# Patient Record
Sex: MALE | Race: WHITE | NOT HISPANIC OR LATINO | Employment: OTHER | URBAN - NONMETROPOLITAN AREA
[De-identification: names, ages, dates, MRNs, and addresses within clinical notes are randomized per-mention and may not be internally consistent; named-entity substitution may affect disease eponyms.]

---

## 2024-10-31 ENCOUNTER — APPOINTMENT (OUTPATIENT)
Dept: RADIOLOGY | Facility: HOSPITAL | Age: 64
End: 2024-10-31

## 2024-10-31 ENCOUNTER — APPOINTMENT (OUTPATIENT)
Dept: CARDIOLOGY | Facility: HOSPITAL | Age: 64
End: 2024-10-31

## 2024-10-31 ENCOUNTER — HOSPITAL ENCOUNTER (EMERGENCY)
Facility: HOSPITAL | Age: 64
Discharge: OTHER INSTITUTION | End: 2024-11-01
Attending: EMERGENCY MEDICINE

## 2024-10-31 ENCOUNTER — HOSPITAL ENCOUNTER (INPATIENT)
Facility: HOSPITAL | Age: 64
Discharge: STILL ADMITTED OR ADMINISTRATIVE | End: 2024-11-01
Attending: INTERNAL MEDICINE | Admitting: INTERNAL MEDICINE
Payer: OTHER GOVERNMENT

## 2024-10-31 DIAGNOSIS — R79.89 LACTATE BLOOD INCREASE: ICD-10-CM

## 2024-10-31 DIAGNOSIS — K25.1 ACUTE GASTRIC ULCER WITH PERFORATION (MULTI): Primary | ICD-10-CM

## 2024-10-31 DIAGNOSIS — J44.9 CHRONIC OBSTRUCTIVE PULMONARY DISEASE, UNSPECIFIED COPD TYPE (MULTI): ICD-10-CM

## 2024-10-31 LAB
ALBUMIN SERPL BCP-MCNC: 4.5 G/DL (ref 3.4–5)
ALP SERPL-CCNC: 69 U/L (ref 33–136)
ALT SERPL W P-5'-P-CCNC: 15 U/L (ref 10–52)
ANION GAP SERPL CALC-SCNC: 13 MMOL/L (ref 10–20)
AST SERPL W P-5'-P-CCNC: 26 U/L (ref 9–39)
BASOPHILS # BLD AUTO: 0.08 X10*3/UL (ref 0–0.1)
BASOPHILS NFR BLD AUTO: 0.8 %
BILIRUB SERPL-MCNC: 0.4 MG/DL (ref 0–1.2)
BUN SERPL-MCNC: 9 MG/DL (ref 6–23)
CALCIUM SERPL-MCNC: 9.3 MG/DL (ref 8.6–10.3)
CARDIAC TROPONIN I PNL SERPL HS: 7 NG/L (ref 0–20)
CHLORIDE SERPL-SCNC: 90 MMOL/L (ref 98–107)
CO2 SERPL-SCNC: 24 MMOL/L (ref 21–32)
CREAT SERPL-MCNC: 0.87 MG/DL (ref 0.5–1.3)
EGFRCR SERPLBLD CKD-EPI 2021: >90 ML/MIN/1.73M*2
EOSINOPHIL # BLD AUTO: 0.14 X10*3/UL (ref 0–0.7)
EOSINOPHIL NFR BLD AUTO: 1.4 %
ERYTHROCYTE [DISTWIDTH] IN BLOOD BY AUTOMATED COUNT: 12.4 % (ref 11.5–14.5)
ETHANOL SERPL-MCNC: 108 MG/DL
GLUCOSE SERPL-MCNC: 79 MG/DL (ref 74–99)
HCT VFR BLD AUTO: 46 % (ref 41–52)
HGB BLD-MCNC: 16.6 G/DL (ref 13.5–17.5)
HOLD SPECIMEN: NORMAL
IMM GRANULOCYTES # BLD AUTO: 0.02 X10*3/UL (ref 0–0.7)
IMM GRANULOCYTES NFR BLD AUTO: 0.2 % (ref 0–0.9)
INR PPP: 1.1 (ref 0.9–1.1)
LACTATE SERPL-SCNC: 2.3 MMOL/L (ref 0.4–2)
LACTATE SERPL-SCNC: 2.6 MMOL/L (ref 0.4–2)
LIPASE SERPL-CCNC: 62 U/L (ref 9–82)
LYMPHOCYTES # BLD AUTO: 1.61 X10*3/UL (ref 1.2–4.8)
LYMPHOCYTES NFR BLD AUTO: 16.3 %
MCH RBC QN AUTO: 33.9 PG (ref 26–34)
MCHC RBC AUTO-ENTMCNC: 36.1 G/DL (ref 32–36)
MCV RBC AUTO: 94 FL (ref 80–100)
MONOCYTES # BLD AUTO: 0.51 X10*3/UL (ref 0.1–1)
MONOCYTES NFR BLD AUTO: 5.2 %
NEUTROPHILS # BLD AUTO: 7.5 X10*3/UL (ref 1.2–7.7)
NEUTROPHILS NFR BLD AUTO: 76.1 %
NRBC BLD-RTO: 0 /100 WBCS (ref 0–0)
PLATELET # BLD AUTO: 479 X10*3/UL (ref 150–450)
POTASSIUM SERPL-SCNC: 3.9 MMOL/L (ref 3.5–5.3)
PROT SERPL-MCNC: 8.1 G/DL (ref 6.4–8.2)
PROTHROMBIN TIME: 12.5 SECONDS (ref 9.8–12.8)
RBC # BLD AUTO: 4.9 X10*6/UL (ref 4.5–5.9)
SODIUM SERPL-SCNC: 123 MMOL/L (ref 136–145)
WBC # BLD AUTO: 9.9 X10*3/UL (ref 4.4–11.3)

## 2024-10-31 PROCEDURE — 2500000004 HC RX 250 GENERAL PHARMACY W/ HCPCS (ALT 636 FOR OP/ED): Performed by: EMERGENCY MEDICINE

## 2024-10-31 PROCEDURE — 85610 PROTHROMBIN TIME: CPT | Performed by: EMERGENCY MEDICINE

## 2024-10-31 PROCEDURE — 71045 X-RAY EXAM CHEST 1 VIEW: CPT

## 2024-10-31 PROCEDURE — S4991 NICOTINE PATCH NONLEGEND: HCPCS | Performed by: EMERGENCY MEDICINE

## 2024-10-31 PROCEDURE — 2500000002 HC RX 250 W HCPCS SELF ADMINISTERED DRUGS (ALT 637 FOR MEDICARE OP, ALT 636 FOR OP/ED): Performed by: EMERGENCY MEDICINE

## 2024-10-31 PROCEDURE — 83605 ASSAY OF LACTIC ACID: CPT | Performed by: EMERGENCY MEDICINE

## 2024-10-31 PROCEDURE — 74177 CT ABD & PELVIS W/CONTRAST: CPT

## 2024-10-31 PROCEDURE — 36415 COLL VENOUS BLD VENIPUNCTURE: CPT | Performed by: EMERGENCY MEDICINE

## 2024-10-31 PROCEDURE — 85025 COMPLETE CBC W/AUTO DIFF WBC: CPT | Performed by: EMERGENCY MEDICINE

## 2024-10-31 PROCEDURE — 94664 DEMO&/EVAL PT USE INHALER: CPT

## 2024-10-31 PROCEDURE — 84075 ASSAY ALKALINE PHOSPHATASE: CPT | Performed by: EMERGENCY MEDICINE

## 2024-10-31 PROCEDURE — 96375 TX/PRO/DX INJ NEW DRUG ADDON: CPT

## 2024-10-31 PROCEDURE — 96361 HYDRATE IV INFUSION ADD-ON: CPT

## 2024-10-31 PROCEDURE — 2500000005 HC RX 250 GENERAL PHARMACY W/O HCPCS: Performed by: EMERGENCY MEDICINE

## 2024-10-31 PROCEDURE — 82077 ASSAY SPEC XCP UR&BREATH IA: CPT | Performed by: EMERGENCY MEDICINE

## 2024-10-31 PROCEDURE — 71045 X-RAY EXAM CHEST 1 VIEW: CPT | Performed by: RADIOLOGY

## 2024-10-31 PROCEDURE — 2500000004 HC RX 250 GENERAL PHARMACY W/ HCPCS (ALT 636 FOR OP/ED)

## 2024-10-31 PROCEDURE — 83930 ASSAY OF BLOOD OSMOLALITY: CPT | Mod: CONLAB

## 2024-10-31 PROCEDURE — 87040 BLOOD CULTURE FOR BACTERIA: CPT | Mod: CONLAB | Performed by: EMERGENCY MEDICINE

## 2024-10-31 PROCEDURE — 99285 EMERGENCY DEPT VISIT HI MDM: CPT | Mod: 25

## 2024-10-31 PROCEDURE — 74177 CT ABD & PELVIS W/CONTRAST: CPT | Performed by: RADIOLOGY

## 2024-10-31 PROCEDURE — 2500000001 HC RX 250 WO HCPCS SELF ADMINISTERED DRUGS (ALT 637 FOR MEDICARE OP): Performed by: EMERGENCY MEDICINE

## 2024-10-31 PROCEDURE — 94640 AIRWAY INHALATION TREATMENT: CPT

## 2024-10-31 PROCEDURE — 93005 ELECTROCARDIOGRAM TRACING: CPT

## 2024-10-31 PROCEDURE — 84484 ASSAY OF TROPONIN QUANT: CPT | Performed by: EMERGENCY MEDICINE

## 2024-10-31 PROCEDURE — 96365 THER/PROPH/DIAG IV INF INIT: CPT | Mod: 59

## 2024-10-31 PROCEDURE — 2550000001 HC RX 255 CONTRASTS: Performed by: EMERGENCY MEDICINE

## 2024-10-31 PROCEDURE — 83690 ASSAY OF LIPASE: CPT | Performed by: EMERGENCY MEDICINE

## 2024-10-31 RX ORDER — DICLOFENAC SODIUM 10 MG/G
4 GEL TOPICAL 4 TIMES DAILY
Status: ON HOLD | COMMUNITY
Start: 2024-08-19

## 2024-10-31 RX ORDER — IPRATROPIUM BROMIDE AND ALBUTEROL 20; 100 UG/1; UG/1
1 SPRAY, METERED RESPIRATORY (INHALATION)
Status: ON HOLD | COMMUNITY

## 2024-10-31 RX ORDER — NAPROXEN SODIUM 220 MG/1
81 TABLET, FILM COATED ORAL DAILY
Status: ON HOLD | COMMUNITY
Start: 2024-02-08

## 2024-10-31 RX ORDER — FENTANYL CITRATE 50 UG/ML
INJECTION, SOLUTION INTRAMUSCULAR; INTRAVENOUS
Status: COMPLETED
Start: 2024-10-31 | End: 2024-10-31

## 2024-10-31 RX ORDER — OXYMETAZOLINE HCL 0.05 %
2 SPRAY, NON-AEROSOL (ML) NASAL ONCE
Status: COMPLETED | OUTPATIENT
Start: 2024-10-31 | End: 2024-10-31

## 2024-10-31 RX ORDER — ALBUTEROL SULFATE 90 UG/1
2 INHALANT RESPIRATORY (INHALATION) EVERY 6 HOURS PRN
Status: ON HOLD | COMMUNITY
Start: 2024-05-01

## 2024-10-31 RX ORDER — BACLOFEN 10 MG/1
1 TABLET ORAL 3 TIMES DAILY PRN
Status: ON HOLD | COMMUNITY
Start: 2024-02-08

## 2024-10-31 RX ORDER — AMLODIPINE BESYLATE 10 MG/1
1 TABLET ORAL DAILY
Status: ON HOLD | COMMUNITY
Start: 2024-02-08

## 2024-10-31 RX ORDER — VIT C/E/ZN/COPPR/LUTEIN/ZEAXAN 250MG-90MG
1000 CAPSULE ORAL DAILY
Status: ON HOLD | COMMUNITY
Start: 2023-07-17

## 2024-10-31 RX ORDER — IPRATROPIUM BROMIDE AND ALBUTEROL SULFATE 2.5; .5 MG/3ML; MG/3ML
3 SOLUTION RESPIRATORY (INHALATION)
Status: DISCONTINUED | OUTPATIENT
Start: 2024-10-31 | End: 2024-10-31

## 2024-10-31 RX ORDER — FLUTICASONE PROPIONATE 50 MCG
2 SPRAY, SUSPENSION (ML) NASAL
Status: ON HOLD | COMMUNITY
Start: 2014-06-22

## 2024-10-31 RX ORDER — FENTANYL CITRATE 50 UG/ML
25 INJECTION, SOLUTION INTRAMUSCULAR; INTRAVENOUS ONCE
Status: COMPLETED | OUTPATIENT
Start: 2024-10-31 | End: 2024-10-31

## 2024-10-31 RX ORDER — MELOXICAM 15 MG/1
1 TABLET ORAL
Status: ON HOLD | COMMUNITY
Start: 2024-09-03

## 2024-10-31 RX ORDER — PANTOPRAZOLE SODIUM 40 MG/10ML
40 INJECTION, POWDER, LYOPHILIZED, FOR SOLUTION INTRAVENOUS DAILY
Status: DISCONTINUED | OUTPATIENT
Start: 2024-10-31 | End: 2024-11-01 | Stop reason: HOSPADM

## 2024-10-31 RX ORDER — IBUPROFEN 200 MG
1 TABLET ORAL DAILY
Status: DISCONTINUED | OUTPATIENT
Start: 2024-10-31 | End: 2024-11-01 | Stop reason: HOSPADM

## 2024-10-31 RX ORDER — LIDOCAINE HYDROCHLORIDE 20 MG/ML
1 JELLY TOPICAL ONCE
Status: COMPLETED | OUTPATIENT
Start: 2024-10-31 | End: 2024-10-31

## 2024-10-31 RX ORDER — IPRATROPIUM BROMIDE AND ALBUTEROL SULFATE 2.5; .5 MG/3ML; MG/3ML
3 SOLUTION RESPIRATORY (INHALATION) ONCE
Status: COMPLETED | OUTPATIENT
Start: 2024-10-31 | End: 2024-10-31

## 2024-10-31 RX ORDER — GABAPENTIN 300 MG/1
2 CAPSULE ORAL 3 TIMES DAILY
Status: ON HOLD | COMMUNITY
Start: 2024-02-08

## 2024-10-31 RX ORDER — SODIUM CHLORIDE 9 MG/ML
200 INJECTION, SOLUTION INTRAVENOUS CONTINUOUS
Status: DISCONTINUED | OUTPATIENT
Start: 2024-10-31 | End: 2024-11-01 | Stop reason: HOSPADM

## 2024-10-31 RX ORDER — HYDROMORPHONE HYDROCHLORIDE 1 MG/ML
1 INJECTION, SOLUTION INTRAMUSCULAR; INTRAVENOUS; SUBCUTANEOUS ONCE
Status: COMPLETED | OUTPATIENT
Start: 2024-10-31 | End: 2024-10-31

## 2024-10-31 RX ORDER — ONDANSETRON HYDROCHLORIDE 2 MG/ML
4 INJECTION, SOLUTION INTRAVENOUS ONCE
Status: COMPLETED | OUTPATIENT
Start: 2024-10-31 | End: 2024-10-31

## 2024-10-31 RX ADMIN — IOHEXOL 75 ML: 350 INJECTION, SOLUTION INTRAVENOUS at 18:45

## 2024-10-31 RX ADMIN — IPRATROPIUM BROMIDE AND ALBUTEROL SULFATE 3 ML: .5; 3 SOLUTION RESPIRATORY (INHALATION) at 19:46

## 2024-10-31 RX ADMIN — IPRATROPIUM BROMIDE AND ALBUTEROL SULFATE 3 ML: .5; 3 SOLUTION RESPIRATORY (INHALATION) at 23:46

## 2024-10-31 RX ADMIN — IPRATROPIUM BROMIDE AND ALBUTEROL SULFATE 3 ML: .5; 3 SOLUTION RESPIRATORY (INHALATION) at 19:36

## 2024-10-31 RX ADMIN — HYDROMORPHONE HYDROCHLORIDE 1 MG: 1 INJECTION, SOLUTION INTRAMUSCULAR; INTRAVENOUS; SUBCUTANEOUS at 17:55

## 2024-10-31 RX ADMIN — SODIUM CHLORIDE 1000 ML: 9 INJECTION, SOLUTION INTRAVENOUS at 17:54

## 2024-10-31 RX ADMIN — PIPERACILLIN SODIUM AND TAZOBACTAM SODIUM 3.38 G: 3; .375 INJECTION, SOLUTION INTRAVENOUS at 20:15

## 2024-10-31 RX ADMIN — FENTANYL CITRATE 25 MCG: 50 INJECTION INTRAMUSCULAR; INTRAVENOUS at 23:14

## 2024-10-31 RX ADMIN — LIDOCAINE HYDROCHLORIDE 1 APPLICATION: 20 JELLY TOPICAL at 22:50

## 2024-10-31 RX ADMIN — SODIUM CHLORIDE 1000 ML: 9 INJECTION, SOLUTION INTRAVENOUS at 19:30

## 2024-10-31 RX ADMIN — NICOTINE 1 PATCH: 21 PATCH, EXTENDED RELEASE TRANSDERMAL at 20:27

## 2024-10-31 RX ADMIN — SODIUM CHLORIDE 200 ML/HR: 9 INJECTION, SOLUTION INTRAVENOUS at 21:18

## 2024-10-31 RX ADMIN — BENZOCAINE, BUTAMBEN, AND TETRACAINE HYDROCHLORIDE 1 SPRAY: .028; .004; .004 AEROSOL, SPRAY TOPICAL at 22:50

## 2024-10-31 RX ADMIN — FENTANYL CITRATE 25 MCG: 50 INJECTION, SOLUTION INTRAMUSCULAR; INTRAVENOUS at 23:14

## 2024-10-31 RX ADMIN — PANTOPRAZOLE SODIUM 40 MG: 40 INJECTION, POWDER, FOR SOLUTION INTRAVENOUS at 20:15

## 2024-10-31 RX ADMIN — ONDANSETRON 4 MG: 2 INJECTION INTRAMUSCULAR; INTRAVENOUS at 17:54

## 2024-10-31 RX ADMIN — OXYMETAZOLINE HYDROCHLORIDE 2 SPRAY: 0.5 SPRAY NASAL at 22:50

## 2024-10-31 ASSESSMENT — PAIN SCALES - GENERAL
PAINLEVEL_OUTOF10: 10 - WORST POSSIBLE PAIN
PAINLEVEL_OUTOF10: 7
PAINLEVEL_OUTOF10: 7
PAINLEVEL_OUTOF10: 0 - NO PAIN
PAINLEVEL_OUTOF10: 7

## 2024-10-31 ASSESSMENT — PAIN DESCRIPTION - PROGRESSION: CLINICAL_PROGRESSION: GRADUALLY WORSENING

## 2024-10-31 ASSESSMENT — PAIN - FUNCTIONAL ASSESSMENT
PAIN_FUNCTIONAL_ASSESSMENT: 0-10
PAIN_FUNCTIONAL_ASSESSMENT: 0-10

## 2024-10-31 ASSESSMENT — PAIN DESCRIPTION - DESCRIPTORS: DESCRIPTORS: DISCOMFORT

## 2024-10-31 ASSESSMENT — PAIN DESCRIPTION - ONSET: ONSET: GRADUAL

## 2024-10-31 ASSESSMENT — PAIN DESCRIPTION - LOCATION
LOCATION: ABDOMEN
LOCATION: ABDOMEN

## 2024-10-31 ASSESSMENT — COLUMBIA-SUICIDE SEVERITY RATING SCALE - C-SSRS
2. HAVE YOU ACTUALLY HAD ANY THOUGHTS OF KILLING YOURSELF?: NO
6. HAVE YOU EVER DONE ANYTHING, STARTED TO DO ANYTHING, OR PREPARED TO DO ANYTHING TO END YOUR LIFE?: NO
1. IN THE PAST MONTH, HAVE YOU WISHED YOU WERE DEAD OR WISHED YOU COULD GO TO SLEEP AND NOT WAKE UP?: NO

## 2024-10-31 ASSESSMENT — PAIN DESCRIPTION - ORIENTATION
ORIENTATION: RIGHT;LEFT
ORIENTATION: ANTERIOR

## 2024-10-31 ASSESSMENT — PAIN DESCRIPTION - PAIN TYPE: TYPE: ACUTE PAIN

## 2024-10-31 ASSESSMENT — PAIN DESCRIPTION - FREQUENCY: FREQUENCY: CONSTANT/CONTINUOUS

## 2024-10-31 NOTE — ED PROVIDER NOTES
Abdominal Pain      HPI  Patient with abdominal pain for nearly a week is gotten worse.  Family member provides part of the history.  Patient has severe abdominal pain.  No vomiting no diarrhea but not eating or drinking well.  Patient does drink alcohol daily.  No chest pain no other major complaints    Social history: Drinks around 6 beers per day    Physical Exam  Gen.: Vitals noted, uncomfortable appearing afebrile   Head: Normocephalic  ENT: Pupils equal, patent nares.  Normal oral mucosa   Neck: Supple.   Cardiac: Regular rate rhythm   Lungs: diminished with some wheezing bilaterally  Abdomen: Distended, diffuse, rigid abdominal tenderness.  Back: No midline or paraspinal tenderness.    Extremities:  Moves all extremities.  Skin: No rash.   Neuro: No focal neurologic deficits.    Past Medical History:   Diagnosis Date   • Melena 09/18/2014    Blood in the stool   • Personal history of colonic polyps 10/02/2014    History of adenomatous polyp of colon     Labs Reviewed   CBC WITH AUTO DIFFERENTIAL - Abnormal       Result Value    WBC 9.9      nRBC 0.0      RBC 4.90      Hemoglobin 16.6      Hematocrit 46.0      MCV 94      MCH 33.9      MCHC 36.1 (*)     RDW 12.4      Platelets 479 (*)     Neutrophils % 76.1      Immature Granulocytes %, Automated 0.2      Lymphocytes % 16.3      Monocytes % 5.2      Eosinophils % 1.4      Basophils % 0.8      Neutrophils Absolute 7.50      Immature Granulocytes Absolute, Automated 0.02      Lymphocytes Absolute 1.61      Monocytes Absolute 0.51      Eosinophils Absolute 0.14      Basophils Absolute 0.08     COMPREHENSIVE METABOLIC PANEL - Abnormal    Glucose 79      Sodium 123 (*)     Potassium 3.9      Chloride 90 (*)     Bicarbonate 24      Anion Gap 13      Urea Nitrogen 9      Creatinine 0.87      eGFR >90      Calcium 9.3      Albumin 4.5      Alkaline Phosphatase 69      Total Protein 8.1      AST 26      Bilirubin, Total 0.4      ALT 15     LACTATE - Abnormal    Lactate  2.6 (*)     Narrative:     Venipuncture immediately after or during the administration of Metamizole may lead to falsely low results. Testing should be performed immediately prior to Metamizole dosing.   ALCOHOL - Abnormal    Alcohol 108 (*)    LIPASE - Normal    Lipase 62      Narrative:     Venipuncture immediately after or during the administration of Metamizole may lead to falsely low results. Testing should be performed immediately prior to Metamizole dosing.   PROTIME-INR - Normal    Protime 12.5      INR 1.1     TROPONIN I, HIGH SENSITIVITY - Normal    Troponin I, High Sensitivity 7      Narrative:     Less than 99th percentile of normal range cutoff-  Female and children under 18 years old <14 ng/L; Male <21 ng/L: Negative  Repeat testing should be performed if clinically indicated.     Female and children under 18 years old 14-50 ng/L; Male 21-50 ng/L:  Consistent with possible cardiac damage and possible increased clinical   risk. Serial measurements may help to assess extent of myocardial damage.     >50 ng/L: Consistent with cardiac damage, increased clinical risk and  myocardial infarction. Serial measurements may help assess extent of   myocardial damage.      NOTE: Children less than 1 year old may have higher baseline troponin   levels and results should be interpreted in conjunction with the overall   clinical context.     NOTE: Troponin I testing is performed using a different   testing methodology at [facility] than at other   [facility]. Direct result comparisons should only   be made within the same method.   GRAY TOP    Extra Tube Hold for add-ons.     URINALYSIS WITH REFLEX CULTURE AND MICROSCOPIC    Narrative:     The following orders were created for panel order Urinalysis with Reflex Culture and Microscopic.  Procedure                               Abnormality         Status                     ---------                               -----------         ------                      Urinalysis with Reflex C...[[id number]]                                                 Extra Urine Gray Tube[[id number]]                                                         Please view results for these tests on the individual orders.   URINALYSIS WITH REFLEX CULTURE AND MICROSCOPIC   EXTRA URINE GRAY TUBE   LACTATE      ED Course as of 11/07/24 1042   Thu Oct 31, 2024   1744 Will give pain medicine and IV fluids. [DD]   1914 Since blood pressure dropped into the 80s could be because he is sleeping and he received Dilaudid however organ to give another liter of fluids since lactate is slightly up and go ahead and get blood cultures also I was able to reassess him since he was able to be more comfortable and follow commands such as taking deep breaths he does have some diffuse breath sounds with wheezing that are diminished. [DD]   1931 Patient's blood pressure for the past hour or so has been in the 80s his pulse ox around 92 on 2 L.  He is going to start the second liter of IV fluids his blood pressure was over 100 until he received the Dilaudid.  He still appears to be uncomfortable but does appear to be in less distress with versus his presentation on arrival.  We are waiting on CAT scan I ordered another lactate since the initial 1 was elevated we will continue to monitor.  Patient most likely at the change of shift will be transitioned to Dr. Delacruz for final workup and disposition [DD]   1943 Readjusted Blood pressure cuff and sat patient up.   His BP now is 115/79, pulse ox 100% while receiving nebulizer. [DD]   1946 Breath sounds better after initial breathing treatment but still a bit of wheezing will give a second breathing treatment. [DD]      ED Course User Index  [DD] Christopher Robb MD         Diagnoses as of 11/07/24 1042   Acute gastric ulcer with perforation (Multi)   Chronic obstructive pulmonary disease, unspecified COPD type (Multi)   Lactate blood increase      EKG per my  interpretation rate of 80, normal sinus rhythm, nonspecific changes,     Medical Decision Making will evaluate patient to rule out acute abdominal infection, versus perforation, versus obstruction.     Christopher Robb MD  11/07/24 1041

## 2024-11-01 ENCOUNTER — APPOINTMENT (OUTPATIENT)
Dept: CARDIOLOGY | Facility: HOSPITAL | Age: 64
End: 2024-11-01
Payer: OTHER GOVERNMENT

## 2024-11-01 ENCOUNTER — ANESTHESIA (OUTPATIENT)
Dept: OPERATING ROOM | Facility: HOSPITAL | Age: 64
End: 2024-11-01

## 2024-11-01 ENCOUNTER — ANESTHESIA EVENT (OUTPATIENT)
Dept: OPERATING ROOM | Facility: HOSPITAL | Age: 64
End: 2024-11-01

## 2024-11-01 ENCOUNTER — HOSPITAL ENCOUNTER (INPATIENT)
Facility: HOSPITAL | Age: 64
End: 2024-11-01
Attending: INTERNAL MEDICINE | Admitting: INTERNAL MEDICINE
Payer: OTHER GOVERNMENT

## 2024-11-01 ENCOUNTER — APPOINTMENT (OUTPATIENT)
Dept: RADIOLOGY | Facility: HOSPITAL | Age: 64
End: 2024-11-01
Payer: OTHER GOVERNMENT

## 2024-11-01 VITALS
BODY MASS INDEX: 24.03 KG/M2 | RESPIRATION RATE: 24 BRPM | TEMPERATURE: 100.4 F | HEART RATE: 90 BPM | HEIGHT: 69 IN | OXYGEN SATURATION: 97 % | WEIGHT: 162.26 LBS | DIASTOLIC BLOOD PRESSURE: 94 MMHG | SYSTOLIC BLOOD PRESSURE: 109 MMHG

## 2024-11-01 DIAGNOSIS — F10.20 ALCOHOLISM (MULTI): ICD-10-CM

## 2024-11-01 DIAGNOSIS — J44.9 CHRONIC OBSTRUCTIVE PULMONARY DISEASE, UNSPECIFIED COPD TYPE (MULTI): ICD-10-CM

## 2024-11-01 DIAGNOSIS — K27.5 PERFORATED ULCER (MULTI): Primary | ICD-10-CM

## 2024-11-01 DIAGNOSIS — J43.9 PULMONARY EMPHYSEMA, UNSPECIFIED EMPHYSEMA TYPE (MULTI): ICD-10-CM

## 2024-11-01 DIAGNOSIS — F17.200 SMOKING: ICD-10-CM

## 2024-11-01 DIAGNOSIS — R09.81 NASAL CONGESTION: ICD-10-CM

## 2024-11-01 DIAGNOSIS — J18.9 COMMUNITY ACQUIRED PNEUMONIA, UNSPECIFIED LATERALITY: ICD-10-CM

## 2024-11-01 DIAGNOSIS — K25.1 ACUTE GASTRIC ULCER WITH PERFORATION (MULTI): ICD-10-CM

## 2024-11-01 DIAGNOSIS — R23.8 SKIN IRRITATION: ICD-10-CM

## 2024-11-01 PROBLEM — I10 PRIMARY HYPERTENSION: Status: ACTIVE | Noted: 2024-11-01

## 2024-11-01 LAB
ABO GROUP (TYPE) IN BLOOD: NORMAL
ALBUMIN SERPL BCP-MCNC: 3 G/DL (ref 3.4–5)
ALBUMIN SERPL BCP-MCNC: 3.5 G/DL (ref 3.4–5)
ANION GAP SERPL CALC-SCNC: 14 MMOL/L (ref 10–20)
ANION GAP SERPL CALC-SCNC: 18 MMOL/L (ref 10–20)
ANTIBODY SCREEN: NORMAL
APPEARANCE UR: CLEAR
ATRIAL RATE: 80 BPM
ATRIAL RATE: 97 BPM
BACTERIA #/AREA URNS AUTO: ABNORMAL /HPF
BILIRUB UR STRIP.AUTO-MCNC: NEGATIVE MG/DL
BUN SERPL-MCNC: 13 MG/DL (ref 6–23)
BUN SERPL-MCNC: 13 MG/DL (ref 6–23)
CALCIUM SERPL-MCNC: 7.7 MG/DL (ref 8.6–10.3)
CALCIUM SERPL-MCNC: 7.9 MG/DL (ref 8.6–10.3)
CHLORIDE SERPL-SCNC: 95 MMOL/L (ref 98–107)
CHLORIDE SERPL-SCNC: 96 MMOL/L (ref 98–107)
CO2 SERPL-SCNC: 18 MMOL/L (ref 21–32)
CO2 SERPL-SCNC: 22 MMOL/L (ref 21–32)
COLOR UR: COLORLESS
CREAT SERPL-MCNC: 1.15 MG/DL (ref 0.5–1.3)
CREAT SERPL-MCNC: 1.19 MG/DL (ref 0.5–1.3)
EGFRCR SERPLBLD CKD-EPI 2021: 69 ML/MIN/1.73M*2
EGFRCR SERPLBLD CKD-EPI 2021: 72 ML/MIN/1.73M*2
ERYTHROCYTE [DISTWIDTH] IN BLOOD BY AUTOMATED COUNT: 13 % (ref 11.5–14.5)
FLUAV RNA RESP QL NAA+PROBE: NOT DETECTED
FLUBV RNA RESP QL NAA+PROBE: NOT DETECTED
GLUCOSE SERPL-MCNC: 114 MG/DL (ref 74–99)
GLUCOSE SERPL-MCNC: 75 MG/DL (ref 74–99)
GLUCOSE UR STRIP.AUTO-MCNC: NORMAL MG/DL
HCT VFR BLD AUTO: 38.9 % (ref 41–52)
HGB BLD-MCNC: 13.5 G/DL (ref 13.5–17.5)
HOLD SPECIMEN: NORMAL
KETONES UR STRIP.AUTO-MCNC: NEGATIVE MG/DL
LACTATE SERPL-SCNC: 2 MMOL/L (ref 0.4–2)
LEUKOCYTE ESTERASE UR QL STRIP.AUTO: NEGATIVE
MAGNESIUM SERPL-MCNC: 1.57 MG/DL (ref 1.6–2.4)
MCH RBC QN AUTO: 33.9 PG (ref 26–34)
MCHC RBC AUTO-ENTMCNC: 34.7 G/DL (ref 32–36)
MCV RBC AUTO: 98 FL (ref 80–100)
NITRITE UR QL STRIP.AUTO: NEGATIVE
NRBC BLD-RTO: 0 /100 WBCS (ref 0–0)
OSMOLALITY SERPL: 273 MOSM/KG (ref 280–300)
OSMOLALITY SERPL: 292 MOSM/KG (ref 280–300)
P AXIS: 70 DEGREES
P AXIS: 78 DEGREES
P OFFSET: 192 MS
P OFFSET: 194 MS
P ONSET: 138 MS
P ONSET: 150 MS
PH UR STRIP.AUTO: 5.5 [PH]
PHOSPHATE SERPL-MCNC: 2.9 MG/DL (ref 2.5–4.9)
PHOSPHATE SERPL-MCNC: 4.2 MG/DL (ref 2.5–4.9)
PLATELET # BLD AUTO: 389 X10*3/UL (ref 150–450)
POTASSIUM SERPL-SCNC: 4.3 MMOL/L (ref 3.5–5.3)
POTASSIUM SERPL-SCNC: 4.7 MMOL/L (ref 3.5–5.3)
PR INTERVAL: 140 MS
PR INTERVAL: 160 MS
PROT UR STRIP.AUTO-MCNC: NEGATIVE MG/DL
Q ONSET: 218 MS
Q ONSET: 220 MS
QRS COUNT: 13 BEATS
QRS COUNT: 16 BEATS
QRS DURATION: 74 MS
QRS DURATION: 82 MS
QT INTERVAL: 340 MS
QT INTERVAL: 384 MS
QTC CALCULATION(BAZETT): 431 MS
QTC CALCULATION(BAZETT): 442 MS
QTC FREDERICIA: 398 MS
QTC FREDERICIA: 423 MS
R AXIS: 31 DEGREES
R AXIS: 64 DEGREES
RBC # BLD AUTO: 3.98 X10*6/UL (ref 4.5–5.9)
RBC # UR STRIP.AUTO: ABNORMAL /UL
RBC #/AREA URNS AUTO: ABNORMAL /HPF
RH FACTOR (ANTIGEN D): NORMAL
SARS-COV-2 RNA RESP QL NAA+PROBE: NOT DETECTED
SODIUM SERPL-SCNC: 126 MMOL/L (ref 136–145)
SODIUM SERPL-SCNC: 128 MMOL/L (ref 136–145)
SP GR UR STRIP.AUTO: 1.01
T AXIS: 61 DEGREES
T AXIS: 71 DEGREES
T OFFSET: 390 MS
T OFFSET: 410 MS
UROBILINOGEN UR STRIP.AUTO-MCNC: NORMAL MG/DL
VENTRICULAR RATE: 80 BPM
VENTRICULAR RATE: 97 BPM
WBC # BLD AUTO: 10.5 X10*3/UL (ref 4.4–11.3)
WBC #/AREA URNS AUTO: ABNORMAL /HPF

## 2024-11-01 PROCEDURE — 86901 BLOOD TYPING SEROLOGIC RH(D): CPT | Performed by: STUDENT IN AN ORGANIZED HEALTH CARE EDUCATION/TRAINING PROGRAM

## 2024-11-01 PROCEDURE — 2500000004 HC RX 250 GENERAL PHARMACY W/ HCPCS (ALT 636 FOR OP/ED): Performed by: SURGERY

## 2024-11-01 PROCEDURE — 2500000002 HC RX 250 W HCPCS SELF ADMINISTERED DRUGS (ALT 637 FOR MEDICARE OP, ALT 636 FOR OP/ED)

## 2024-11-01 PROCEDURE — 2500000004 HC RX 250 GENERAL PHARMACY W/ HCPCS (ALT 636 FOR OP/ED)

## 2024-11-01 PROCEDURE — 2500000004 HC RX 250 GENERAL PHARMACY W/ HCPCS (ALT 636 FOR OP/ED): Performed by: STUDENT IN AN ORGANIZED HEALTH CARE EDUCATION/TRAINING PROGRAM

## 2024-11-01 PROCEDURE — 0D9670Z DRAINAGE OF STOMACH WITH DRAINAGE DEVICE, VIA NATURAL OR ARTIFICIAL OPENING: ICD-10-PCS | Performed by: INTERNAL MEDICINE

## 2024-11-01 PROCEDURE — 86900 BLOOD TYPING SEROLOGIC ABO: CPT | Performed by: STUDENT IN AN ORGANIZED HEALTH CARE EDUCATION/TRAINING PROGRAM

## 2024-11-01 PROCEDURE — 2500000004 HC RX 250 GENERAL PHARMACY W/ HCPCS (ALT 636 FOR OP/ED): Performed by: NURSE ANESTHETIST, CERTIFIED REGISTERED

## 2024-11-01 PROCEDURE — 2500000004 HC RX 250 GENERAL PHARMACY W/ HCPCS (ALT 636 FOR OP/ED): Performed by: INTERNAL MEDICINE

## 2024-11-01 PROCEDURE — 99223 1ST HOSP IP/OBS HIGH 75: CPT

## 2024-11-01 PROCEDURE — 83605 ASSAY OF LACTIC ACID: CPT

## 2024-11-01 PROCEDURE — 36415 COLL VENOUS BLD VENIPUNCTURE: CPT

## 2024-11-01 PROCEDURE — 7100000001 HC RECOVERY ROOM TIME - INITIAL BASE CHARGE: Performed by: SURGERY

## 2024-11-01 PROCEDURE — 2500000001 HC RX 250 WO HCPCS SELF ADMINISTERED DRUGS (ALT 637 FOR MEDICARE OP)

## 2024-11-01 PROCEDURE — 71045 X-RAY EXAM CHEST 1 VIEW: CPT | Performed by: STUDENT IN AN ORGANIZED HEALTH CARE EDUCATION/TRAINING PROGRAM

## 2024-11-01 PROCEDURE — 93005 ELECTROCARDIOGRAM TRACING: CPT

## 2024-11-01 PROCEDURE — 80069 RENAL FUNCTION PANEL: CPT

## 2024-11-01 PROCEDURE — 94664 DEMO&/EVAL PT USE INHALER: CPT

## 2024-11-01 PROCEDURE — 43659 UNLISTED LAPS PX STOMACH: CPT | Performed by: SURGERY

## 2024-11-01 PROCEDURE — 2500000005 HC RX 250 GENERAL PHARMACY W/O HCPCS: Performed by: NURSE PRACTITIONER

## 2024-11-01 PROCEDURE — 85027 COMPLETE CBC AUTOMATED: CPT

## 2024-11-01 PROCEDURE — 94760 N-INVAS EAR/PLS OXIMETRY 1: CPT

## 2024-11-01 PROCEDURE — 2500000005 HC RX 250 GENERAL PHARMACY W/O HCPCS: Performed by: SURGERY

## 2024-11-01 PROCEDURE — 3700000002 HC GENERAL ANESTHESIA TIME - EACH INCREMENTAL 1 MINUTE: Performed by: SURGERY

## 2024-11-01 PROCEDURE — 84300 ASSAY OF URINE SODIUM: CPT | Mod: GEALAB

## 2024-11-01 PROCEDURE — 82570 ASSAY OF URINE CREATININE: CPT | Mod: GEALAB

## 2024-11-01 PROCEDURE — 7100000002 HC RECOVERY ROOM TIME - EACH INCREMENTAL 1 MINUTE: Performed by: SURGERY

## 2024-11-01 PROCEDURE — 3600000008 HC OR TIME - EACH INCREMENTAL 1 MINUTE - PROCEDURE LEVEL THREE: Performed by: SURGERY

## 2024-11-01 PROCEDURE — 83930 ASSAY OF BLOOD OSMOLALITY: CPT | Mod: GEALAB | Performed by: INTERNAL MEDICINE

## 2024-11-01 PROCEDURE — 3700000001 HC GENERAL ANESTHESIA TIME - INITIAL BASE CHARGE: Performed by: SURGERY

## 2024-11-01 PROCEDURE — 82436 ASSAY OF URINE CHLORIDE: CPT | Mod: GEALAB

## 2024-11-01 PROCEDURE — 87636 SARSCOV2 & INF A&B AMP PRB: CPT | Performed by: INTERNAL MEDICINE

## 2024-11-01 PROCEDURE — 86850 RBC ANTIBODY SCREEN: CPT | Performed by: STUDENT IN AN ORGANIZED HEALTH CARE EDUCATION/TRAINING PROGRAM

## 2024-11-01 PROCEDURE — 83935 ASSAY OF URINE OSMOLALITY: CPT | Mod: GEALAB

## 2024-11-01 PROCEDURE — 2060000001 HC INTERMEDIATE ICU ROOM DAILY

## 2024-11-01 PROCEDURE — 83935 ASSAY OF URINE OSMOLALITY: CPT | Mod: GEALAB | Performed by: INTERNAL MEDICINE

## 2024-11-01 PROCEDURE — 3600000003 HC OR TIME - INITIAL BASE CHARGE - PROCEDURE LEVEL THREE: Performed by: SURGERY

## 2024-11-01 PROCEDURE — 2500000002 HC RX 250 W HCPCS SELF ADMINISTERED DRUGS (ALT 637 FOR MEDICARE OP, ALT 636 FOR OP/ED): Performed by: STUDENT IN AN ORGANIZED HEALTH CARE EDUCATION/TRAINING PROGRAM

## 2024-11-01 PROCEDURE — 2500000005 HC RX 250 GENERAL PHARMACY W/O HCPCS: Performed by: STUDENT IN AN ORGANIZED HEALTH CARE EDUCATION/TRAINING PROGRAM

## 2024-11-01 PROCEDURE — 94640 AIRWAY INHALATION TREATMENT: CPT

## 2024-11-01 PROCEDURE — 9420000001 HC RT PATIENT EDUCATION 5 MIN

## 2024-11-01 PROCEDURE — 83735 ASSAY OF MAGNESIUM: CPT

## 2024-11-01 PROCEDURE — 84100 ASSAY OF PHOSPHORUS: CPT

## 2024-11-01 PROCEDURE — S4991 NICOTINE PATCH NONLEGEND: HCPCS

## 2024-11-01 PROCEDURE — 94762 N-INVAS EAR/PLS OXIMTRY CONT: CPT

## 2024-11-01 PROCEDURE — 2720000007 HC OR 272 NO HCPCS: Performed by: SURGERY

## 2024-11-01 PROCEDURE — 71045 X-RAY EXAM CHEST 1 VIEW: CPT

## 2024-11-01 PROCEDURE — 2500000005 HC RX 250 GENERAL PHARMACY W/O HCPCS

## 2024-11-01 PROCEDURE — 81001 URINALYSIS AUTO W/SCOPE: CPT

## 2024-11-01 RX ORDER — PHENYLEPHRINE HCL IN 0.9% NACL 0.4MG/10ML
SYRINGE (ML) INTRAVENOUS AS NEEDED
Status: DISCONTINUED | OUTPATIENT
Start: 2024-11-01 | End: 2024-11-01

## 2024-11-01 RX ORDER — NAPROXEN SODIUM 220 MG/1
81 TABLET, FILM COATED ORAL DAILY
Status: ACTIVE | OUTPATIENT
Start: 2024-11-01

## 2024-11-01 RX ORDER — PROPOFOL 10 MG/ML
INJECTION, EMULSION INTRAVENOUS AS NEEDED
Status: DISCONTINUED | OUTPATIENT
Start: 2024-11-01 | End: 2024-11-01

## 2024-11-01 RX ORDER — LORAZEPAM 2 MG/ML
2 INJECTION INTRAMUSCULAR EVERY 2 HOUR PRN
Status: DISPENSED | OUTPATIENT
Start: 2024-11-01

## 2024-11-01 RX ORDER — MAGNESIUM SULFATE HEPTAHYDRATE 40 MG/ML
2 INJECTION, SOLUTION INTRAVENOUS ONCE
Status: COMPLETED | OUTPATIENT
Start: 2024-11-01 | End: 2024-11-01

## 2024-11-01 RX ORDER — IPRATROPIUM BROMIDE AND ALBUTEROL SULFATE 2.5; .5 MG/3ML; MG/3ML
3 SOLUTION RESPIRATORY (INHALATION)
Status: DISCONTINUED | OUTPATIENT
Start: 2024-11-01 | End: 2024-11-01

## 2024-11-01 RX ORDER — FLUTICASONE PROPIONATE 50 MCG
2 SPRAY, SUSPENSION (ML) NASAL
Status: DISPENSED | OUTPATIENT
Start: 2024-11-01

## 2024-11-01 RX ORDER — ROCURONIUM BROMIDE 10 MG/ML
INJECTION, SOLUTION INTRAVENOUS AS NEEDED
Status: DISCONTINUED | OUTPATIENT
Start: 2024-11-01 | End: 2024-11-01

## 2024-11-01 RX ORDER — UREA 40 %
1 CREAM (GRAM) TOPICAL 2 TIMES DAILY
Status: ON HOLD | COMMUNITY
Start: 2024-09-17

## 2024-11-01 RX ORDER — ENOXAPARIN SODIUM 100 MG/ML
40 INJECTION SUBCUTANEOUS EVERY 24 HOURS
Status: DISPENSED | OUTPATIENT
Start: 2024-11-01

## 2024-11-01 RX ORDER — FENTANYL CITRATE 50 UG/ML
INJECTION, SOLUTION INTRAMUSCULAR; INTRAVENOUS AS NEEDED
Status: DISCONTINUED | OUTPATIENT
Start: 2024-11-01 | End: 2024-11-01

## 2024-11-01 RX ORDER — LORAZEPAM 2 MG/ML
0.5 INJECTION INTRAMUSCULAR EVERY 2 HOUR PRN
Status: ACTIVE | OUTPATIENT
Start: 2024-11-01

## 2024-11-01 RX ORDER — HYDRALAZINE HYDROCHLORIDE 20 MG/ML
5 INJECTION INTRAMUSCULAR; INTRAVENOUS EVERY 30 MIN PRN
Status: DISCONTINUED | OUTPATIENT
Start: 2024-11-01 | End: 2024-11-01 | Stop reason: HOSPADM

## 2024-11-01 RX ORDER — OXYCODONE HYDROCHLORIDE 5 MG/1
5 TABLET ORAL EVERY 4 HOURS PRN
Status: DISCONTINUED | OUTPATIENT
Start: 2024-11-01 | End: 2024-11-01 | Stop reason: HOSPADM

## 2024-11-01 RX ORDER — SODIUM CHLORIDE 9 MG/ML
100 INJECTION, SOLUTION INTRAVENOUS CONTINUOUS
Status: ACTIVE | OUTPATIENT
Start: 2024-11-01 | End: 2024-11-02

## 2024-11-01 RX ORDER — THIAMINE HYDROCHLORIDE 100 MG/ML
100 INJECTION, SOLUTION INTRAMUSCULAR; INTRAVENOUS DAILY
Status: COMPLETED | OUTPATIENT
Start: 2024-11-01 | End: 2024-11-03

## 2024-11-01 RX ORDER — ALBUTEROL SULFATE 90 UG/1
2 INHALANT RESPIRATORY (INHALATION) EVERY 6 HOURS PRN
Status: ACTIVE | OUTPATIENT
Start: 2024-11-01

## 2024-11-01 RX ORDER — IPRATROPIUM BROMIDE AND ALBUTEROL SULFATE 2.5; .5 MG/3ML; MG/3ML
3 SOLUTION RESPIRATORY (INHALATION) ONCE
OUTPATIENT
Start: 2024-11-01 | End: 2024-11-01

## 2024-11-01 RX ORDER — IBUPROFEN 200 MG
1 TABLET ORAL DAILY
Status: DISPENSED | OUTPATIENT
Start: 2024-11-01

## 2024-11-01 RX ORDER — SODIUM CHLORIDE 0.9 G/100ML
IRRIGANT IRRIGATION AS NEEDED
Status: DISCONTINUED | OUTPATIENT
Start: 2024-11-01 | End: 2024-11-01 | Stop reason: HOSPADM

## 2024-11-01 RX ORDER — SODIUM CHLORIDE, SODIUM LACTATE, POTASSIUM CHLORIDE, CALCIUM CHLORIDE 600; 310; 30; 20 MG/100ML; MG/100ML; MG/100ML; MG/100ML
75 INJECTION, SOLUTION INTRAVENOUS CONTINUOUS
Status: ACTIVE | OUTPATIENT
Start: 2024-11-01 | End: 2024-11-01

## 2024-11-01 RX ORDER — ACETAMINOPHEN 325 MG/1
650 TABLET ORAL EVERY 6 HOURS PRN
Status: DISCONTINUED | OUTPATIENT
Start: 2024-11-01 | End: 2024-11-02

## 2024-11-01 RX ORDER — IPRATROPIUM BROMIDE AND ALBUTEROL SULFATE 2.5; .5 MG/3ML; MG/3ML
3 SOLUTION RESPIRATORY (INHALATION) EVERY 2 HOUR PRN
Status: DISCONTINUED | OUTPATIENT
Start: 2024-11-01 | End: 2024-11-01

## 2024-11-01 RX ORDER — MELOXICAM 7.5 MG/1
15 TABLET ORAL
Status: DISPENSED | OUTPATIENT
Start: 2024-11-01

## 2024-11-01 RX ORDER — PANTOPRAZOLE SODIUM 40 MG/10ML
40 INJECTION, POWDER, LYOPHILIZED, FOR SOLUTION INTRAVENOUS DAILY
Status: DISCONTINUED | OUTPATIENT
Start: 2024-11-01 | End: 2024-11-02

## 2024-11-01 RX ORDER — ONDANSETRON HYDROCHLORIDE 2 MG/ML
4 INJECTION, SOLUTION INTRAVENOUS ONCE AS NEEDED
Status: DISCONTINUED | OUTPATIENT
Start: 2024-11-01 | End: 2024-11-01 | Stop reason: HOSPADM

## 2024-11-01 RX ORDER — POLYETHYLENE GLYCOL 3350 17 G/17G
17 POWDER, FOR SOLUTION ORAL DAILY PRN
Status: DISCONTINUED | OUTPATIENT
Start: 2024-11-01 | End: 2024-11-02

## 2024-11-01 RX ORDER — BACLOFEN 10 MG/1
10 TABLET ORAL 3 TIMES DAILY PRN
Status: DISCONTINUED | OUTPATIENT
Start: 2024-11-01 | End: 2024-11-02

## 2024-11-01 RX ORDER — IPRATROPIUM BROMIDE AND ALBUTEROL SULFATE 2.5; .5 MG/3ML; MG/3ML
3 SOLUTION RESPIRATORY (INHALATION)
Status: DISPENSED | OUTPATIENT
Start: 2024-11-01

## 2024-11-01 RX ORDER — IPRATROPIUM BROMIDE AND ALBUTEROL SULFATE 2.5; .5 MG/3ML; MG/3ML
3 SOLUTION RESPIRATORY (INHALATION)
Status: DISCONTINUED | OUTPATIENT
Start: 2024-11-01 | End: 2024-11-01 | Stop reason: HOSPADM

## 2024-11-01 RX ORDER — SODIUM CHLORIDE, SODIUM LACTATE, POTASSIUM CHLORIDE, CALCIUM CHLORIDE 600; 310; 30; 20 MG/100ML; MG/100ML; MG/100ML; MG/100ML
INJECTION, SOLUTION INTRAVENOUS CONTINUOUS PRN
Status: DISCONTINUED | OUTPATIENT
Start: 2024-11-01 | End: 2024-11-01

## 2024-11-01 RX ORDER — AMLODIPINE BESYLATE 5 MG/1
10 TABLET ORAL DAILY
Status: DISPENSED | OUTPATIENT
Start: 2024-11-01

## 2024-11-01 RX ORDER — ALBUTEROL SULFATE 0.83 MG/ML
2.5 SOLUTION RESPIRATORY (INHALATION) ONCE AS NEEDED
Status: COMPLETED | OUTPATIENT
Start: 2024-11-01 | End: 2024-11-01

## 2024-11-01 RX ORDER — FOLIC ACID 1 MG/1
1 TABLET ORAL DAILY
Status: DISCONTINUED | OUTPATIENT
Start: 2024-11-01 | End: 2024-11-01

## 2024-11-01 RX ORDER — GABAPENTIN 300 MG/1
600 CAPSULE ORAL 3 TIMES DAILY
Status: DISCONTINUED | OUTPATIENT
Start: 2024-11-01 | End: 2024-11-02

## 2024-11-01 RX ORDER — BUPIVACAINE HYDROCHLORIDE 5 MG/ML
INJECTION, SOLUTION PERINEURAL AS NEEDED
Status: DISCONTINUED | OUTPATIENT
Start: 2024-11-01 | End: 2024-11-01 | Stop reason: HOSPADM

## 2024-11-01 RX ORDER — ALBUTEROL SULFATE 0.83 MG/ML
2.5 SOLUTION RESPIRATORY (INHALATION) EVERY 2 HOUR PRN
Status: DISPENSED | OUTPATIENT
Start: 2024-11-01

## 2024-11-01 RX ORDER — MULTIVIT-MIN/IRON FUM/FOLIC AC 7.5 MG-4
1 TABLET ORAL DAILY
Status: DISPENSED | OUTPATIENT
Start: 2024-11-01

## 2024-11-01 RX ORDER — LIDOCAINE HYDROCHLORIDE 20 MG/ML
1 JELLY TOPICAL ONCE
Status: COMPLETED | OUTPATIENT
Start: 2024-11-01 | End: 2024-11-01

## 2024-11-01 RX ORDER — LABETALOL HYDROCHLORIDE 5 MG/ML
5 INJECTION, SOLUTION INTRAVENOUS ONCE AS NEEDED
Status: DISCONTINUED | OUTPATIENT
Start: 2024-11-01 | End: 2024-11-01 | Stop reason: HOSPADM

## 2024-11-01 RX ORDER — ONDANSETRON HYDROCHLORIDE 2 MG/ML
INJECTION, SOLUTION INTRAVENOUS AS NEEDED
Status: DISCONTINUED | OUTPATIENT
Start: 2024-11-01 | End: 2024-11-01

## 2024-11-01 RX ORDER — MIDAZOLAM HYDROCHLORIDE 1 MG/ML
INJECTION, SOLUTION INTRAMUSCULAR; INTRAVENOUS AS NEEDED
Status: DISCONTINUED | OUTPATIENT
Start: 2024-11-01 | End: 2024-11-01

## 2024-11-01 RX ORDER — LIDOCAINE HCL/PF 100 MG/5ML
SYRINGE (ML) INTRAVENOUS AS NEEDED
Status: DISCONTINUED | OUTPATIENT
Start: 2024-11-01 | End: 2024-11-01

## 2024-11-01 RX ORDER — LANOLIN ALCOHOL/MO/W.PET/CERES
100 CREAM (GRAM) TOPICAL DAILY
Status: ACTIVE | OUTPATIENT
Start: 2024-11-04

## 2024-11-01 RX ORDER — LORAZEPAM 2 MG/ML
1 INJECTION INTRAMUSCULAR EVERY 2 HOUR PRN
Status: ACTIVE | OUTPATIENT
Start: 2024-11-01

## 2024-11-01 RX ADMIN — Medication 4 L/MIN: at 20:09

## 2024-11-01 RX ADMIN — MAGNESIUM SULFATE 2 G: 2 INJECTION INTRAVENOUS at 10:57

## 2024-11-01 RX ADMIN — ACETAMINOPHEN 650 MG: 325 TABLET ORAL at 21:12

## 2024-11-01 RX ADMIN — SODIUM CHLORIDE 100 ML/HR: 9 INJECTION, SOLUTION INTRAVENOUS at 10:58

## 2024-11-01 RX ADMIN — Medication 3 L/MIN: at 20:00

## 2024-11-01 RX ADMIN — PIPERACILLIN SODIUM AND TAZOBACTAM SODIUM 4.5 G: 4; .5 INJECTION, SOLUTION INTRAVENOUS at 14:17

## 2024-11-01 RX ADMIN — FOLIC ACID 1 MG: 5 INJECTION, SOLUTION INTRAMUSCULAR; INTRAVENOUS; SUBCUTANEOUS at 10:57

## 2024-11-01 RX ADMIN — HYDROMORPHONE HYDROCHLORIDE 0.25 MG: 1 INJECTION, SOLUTION INTRAMUSCULAR; INTRAVENOUS; SUBCUTANEOUS at 06:09

## 2024-11-01 RX ADMIN — HYDROMORPHONE HYDROCHLORIDE 0.4 MG: 1 INJECTION, SOLUTION INTRAMUSCULAR; INTRAVENOUS; SUBCUTANEOUS at 21:12

## 2024-11-01 RX ADMIN — Medication 1 TABLET: at 08:57

## 2024-11-01 RX ADMIN — Medication 2 L/MIN: at 02:26

## 2024-11-01 RX ADMIN — PANTOPRAZOLE SODIUM 40 MG: 40 INJECTION, POWDER, FOR SOLUTION INTRAVENOUS at 08:56

## 2024-11-01 RX ADMIN — SODIUM CHLORIDE, POTASSIUM CHLORIDE, SODIUM LACTATE AND CALCIUM CHLORIDE 75 ML/HR: 600; 310; 30; 20 INJECTION, SOLUTION INTRAVENOUS at 06:03

## 2024-11-01 RX ADMIN — GABAPENTIN 600 MG: 300 CAPSULE ORAL at 21:12

## 2024-11-01 RX ADMIN — PIPERACILLIN SODIUM AND TAZOBACTAM SODIUM 4.5 G: 4; .5 INJECTION, SOLUTION INTRAVENOUS at 21:13

## 2024-11-01 RX ADMIN — HYDROMORPHONE HYDROCHLORIDE 0.4 MG: 1 INJECTION, SOLUTION INTRAMUSCULAR; INTRAVENOUS; SUBCUTANEOUS at 16:38

## 2024-11-01 RX ADMIN — GABAPENTIN 600 MG: 300 CAPSULE ORAL at 14:16

## 2024-11-01 RX ADMIN — Medication 10 L/MIN: at 05:04

## 2024-11-01 RX ADMIN — AMLODIPINE BESYLATE 10 MG: 5 TABLET ORAL at 08:56

## 2024-11-01 RX ADMIN — NICOTINE 1 PATCH: 21 PATCH, EXTENDED RELEASE TRANSDERMAL at 08:56

## 2024-11-01 RX ADMIN — THIAMINE HYDROCHLORIDE 100 MG: 100 INJECTION, SOLUTION INTRAMUSCULAR; INTRAVENOUS at 08:57

## 2024-11-01 RX ADMIN — GABAPENTIN 600 MG: 300 CAPSULE ORAL at 08:56

## 2024-11-01 RX ADMIN — HYDROMORPHONE HYDROCHLORIDE 0.4 MG: 1 INJECTION, SOLUTION INTRAMUSCULAR; INTRAVENOUS; SUBCUTANEOUS at 09:25

## 2024-11-01 RX ADMIN — PIPERACILLIN SODIUM AND TAZOBACTAM SODIUM 4.5 G: 4; .5 INJECTION, SOLUTION INTRAVENOUS at 02:14

## 2024-11-01 RX ADMIN — METHYLPREDNISOLONE SODIUM SUCCINATE 40 MG: 40 INJECTION, POWDER, FOR SOLUTION INTRAMUSCULAR; INTRAVENOUS at 08:57

## 2024-11-01 RX ADMIN — IPRATROPIUM BROMIDE AND ALBUTEROL SULFATE 3 ML: .5; 3 SOLUTION RESPIRATORY (INHALATION) at 02:26

## 2024-11-01 RX ADMIN — ALBUTEROL SULFATE 2.5 MG: 2.5 SOLUTION RESPIRATORY (INHALATION) at 05:32

## 2024-11-01 RX ADMIN — IPRATROPIUM BROMIDE AND ALBUTEROL SULFATE 3 ML: .5; 3 SOLUTION RESPIRATORY (INHALATION) at 20:09

## 2024-11-01 RX ADMIN — Medication 4 L/MIN: at 17:02

## 2024-11-01 RX ADMIN — HYDROMORPHONE HYDROCHLORIDE 0.4 MG: 1 INJECTION, SOLUTION INTRAMUSCULAR; INTRAVENOUS; SUBCUTANEOUS at 12:39

## 2024-11-01 RX ADMIN — IPRATROPIUM BROMIDE AND ALBUTEROL SULFATE 3 ML: .5; 3 SOLUTION RESPIRATORY (INHALATION) at 16:59

## 2024-11-01 RX ADMIN — LORAZEPAM 0.5 MG: 2 INJECTION INTRAMUSCULAR; INTRAVENOUS at 14:17

## 2024-11-01 RX ADMIN — LIDOCAINE HYDROCHLORIDE 1 APPLICATION: 20 JELLY TOPICAL at 01:21

## 2024-11-01 RX ADMIN — Medication 4 L/MIN: at 05:30

## 2024-11-01 RX ADMIN — PIPERACILLIN SODIUM AND TAZOBACTAM SODIUM 4.5 G: 4; .5 INJECTION, SOLUTION INTRAVENOUS at 08:56

## 2024-11-01 SDOH — SOCIAL STABILITY: SOCIAL INSECURITY
WITHIN THE LAST YEAR, HAVE YOU BEEN RAPED OR FORCED TO HAVE ANY KIND OF SEXUAL ACTIVITY BY YOUR PARTNER OR EX-PARTNER?: NO

## 2024-11-01 SDOH — SOCIAL STABILITY: SOCIAL INSECURITY: ABUSE: ADULT

## 2024-11-01 SDOH — ECONOMIC STABILITY: FOOD INSECURITY: WITHIN THE PAST 12 MONTHS, YOU WORRIED THAT YOUR FOOD WOULD RUN OUT BEFORE YOU GOT THE MONEY TO BUY MORE.: NEVER TRUE

## 2024-11-01 SDOH — ECONOMIC STABILITY: FOOD INSECURITY: WITHIN THE PAST 12 MONTHS, THE FOOD YOU BOUGHT JUST DIDN'T LAST AND YOU DIDN'T HAVE MONEY TO GET MORE.: NEVER TRUE

## 2024-11-01 SDOH — ECONOMIC STABILITY: INCOME INSECURITY: IN THE PAST 12 MONTHS HAS THE ELECTRIC, GAS, OIL, OR WATER COMPANY THREATENED TO SHUT OFF SERVICES IN YOUR HOME?: NO

## 2024-11-01 SDOH — SOCIAL STABILITY: SOCIAL INSECURITY
WITHIN THE LAST YEAR, HAVE YOU BEEN KICKED, HIT, SLAPPED, OR OTHERWISE PHYSICALLY HURT BY YOUR PARTNER OR EX-PARTNER?: NO

## 2024-11-01 SDOH — SOCIAL STABILITY: SOCIAL INSECURITY: WITHIN THE LAST YEAR, HAVE YOU BEEN HUMILIATED OR EMOTIONALLY ABUSED IN OTHER WAYS BY YOUR PARTNER OR EX-PARTNER?: NO

## 2024-11-01 SDOH — SOCIAL STABILITY: SOCIAL INSECURITY: HAVE YOU HAD ANY THOUGHTS OF HARMING ANYONE ELSE?: NO

## 2024-11-01 SDOH — SOCIAL STABILITY: SOCIAL INSECURITY: HAVE YOU HAD THOUGHTS OF HARMING ANYONE ELSE?: NO

## 2024-11-01 SDOH — SOCIAL STABILITY: SOCIAL INSECURITY: ARE YOU OR HAVE YOU BEEN THREATENED OR ABUSED PHYSICALLY, EMOTIONALLY, OR SEXUALLY BY ANYONE?: NO

## 2024-11-01 SDOH — SOCIAL STABILITY: SOCIAL INSECURITY: WITHIN THE LAST YEAR, HAVE YOU BEEN AFRAID OF YOUR PARTNER OR EX-PARTNER?: NO

## 2024-11-01 SDOH — SOCIAL STABILITY: SOCIAL INSECURITY: ARE THERE ANY APPARENT SIGNS OF INJURIES/BEHAVIORS THAT COULD BE RELATED TO ABUSE/NEGLECT?: NO

## 2024-11-01 SDOH — SOCIAL STABILITY: SOCIAL INSECURITY: DOES ANYONE TRY TO KEEP YOU FROM HAVING/CONTACTING OTHER FRIENDS OR DOING THINGS OUTSIDE YOUR HOME?: NO

## 2024-11-01 SDOH — SOCIAL STABILITY: SOCIAL INSECURITY: HAS ANYONE EVER THREATENED TO HURT YOUR FAMILY OR YOUR PETS?: NO

## 2024-11-01 SDOH — SOCIAL STABILITY: SOCIAL INSECURITY: WERE YOU ABLE TO COMPLETE ALL THE BEHAVIORAL HEALTH SCREENINGS?: YES

## 2024-11-01 SDOH — SOCIAL STABILITY: SOCIAL INSECURITY: DO YOU FEEL ANYONE HAS EXPLOITED OR TAKEN ADVANTAGE OF YOU FINANCIALLY OR OF YOUR PERSONAL PROPERTY?: NO

## 2024-11-01 SDOH — SOCIAL STABILITY: SOCIAL INSECURITY: DO YOU FEEL UNSAFE GOING BACK TO THE PLACE WHERE YOU ARE LIVING?: NO

## 2024-11-01 SDOH — HEALTH STABILITY: MENTAL HEALTH: CURRENT SMOKER: 1

## 2024-11-01 ASSESSMENT — PAIN DESCRIPTION - DESCRIPTORS
DESCRIPTORS: DISCOMFORT
DESCRIPTORS: DISCOMFORT

## 2024-11-01 ASSESSMENT — LIFESTYLE VARIABLES
ANXIETY: NO ANXIETY, AT EASE
BLOOD PRESSURE: 110/64
PAROXYSMAL SWEATS: NO SWEAT VISIBLE
TOTAL SCORE: 2
HEADACHE, FULLNESS IN HEAD: NOT PRESENT
HEADACHE, FULLNESS IN HEAD: NOT PRESENT
HOW OFTEN DURING THE LAST YEAR HAVE YOU HAD A FEELING OF GUILT OR REMORSE AFTER DRINKING: LESS THAN MONTHLY
PAROXYSMAL SWEATS: NO SWEAT VISIBLE
NAUSEA AND VOMITING: NO NAUSEA AND NO VOMITING
VISUAL DISTURBANCES: NOT PRESENT
TOTAL SCORE: 6
ANXIETY: NO ANXIETY, AT EASE
AUDITORY DISTURBANCES: NOT PRESENT
TOTAL SCORE: 1
TREMOR: NO TREMOR
PAROXYSMAL SWEATS: NO SWEAT VISIBLE
PAROXYSMAL SWEATS: BEADS OF SWEAT OBVIOUS ON FOREHEAD
VISUAL DISTURBANCES: NOT PRESENT
VISUAL DISTURBANCES: NOT PRESENT
TOTAL SCORE: 0
ORIENTATION AND CLOUDING OF SENSORIUM: ORIENTED AND CAN DO SERIAL ADDITIONS
HEADACHE, FULLNESS IN HEAD: NOT PRESENT
HEADACHE, FULLNESS IN HEAD: NOT PRESENT
AGITATION: NORMAL ACTIVITY
HOW OFTEN DO YOU HAVE A DRINK CONTAINING ALCOHOL: 4 OR MORE TIMES A WEEK
VISUAL DISTURBANCES: NOT PRESENT
HOW OFTEN DURING THE LAST YEAR HAVE YOU FAILED TO DO WHAT WAS NORMALLY EXPECTED FROM YOU BECAUSE OF DRINKING: LESS THAN MONTHLY
VISUAL DISTURBANCES: NOT PRESENT
ORIENTATION AND CLOUDING OF SENSORIUM: ORIENTED AND CAN DO SERIAL ADDITIONS
AUDITORY DISTURBANCES: NOT PRESENT
ANXIETY: NO ANXIETY, AT EASE
ORIENTATION AND CLOUDING OF SENSORIUM: ORIENTED AND CAN DO SERIAL ADDITIONS
HOW OFTEN DO YOU HAVE 6 OR MORE DRINKS ON ONE OCCASION: MONTHLY
AUDIT TOTAL SCORE: 7
AGITATION: NORMAL ACTIVITY
PAROXYSMAL SWEATS: BARELY PERCEPTIBLE SWEATING, PALMS MOIST
BLOOD PRESSURE: 102/59
TREMOR: NO TREMOR
TREMOR: NO TREMOR
AGITATION: SOMEWHAT MORE THAN NORMAL ACTIVITY
NAUSEA AND VOMITING: NO NAUSEA AND NO VOMITING
ORIENTATION AND CLOUDING OF SENSORIUM: ORIENTED AND CAN DO SERIAL ADDITIONS
AUDITORY DISTURBANCES: NOT PRESENT
TREMOR: NO TREMOR
AUDIT-C TOTAL SCORE: 8
AGITATION: NORMAL ACTIVITY
TOTAL SCORE: 5
ANXIETY: NO ANXIETY, AT EASE
AGITATION: NORMAL ACTIVITY
PAROXYSMAL SWEATS: BEADS OF SWEAT OBVIOUS ON FOREHEAD
NAUSEA AND VOMITING: NO NAUSEA AND NO VOMITING
SKIP TO QUESTIONS 9-10: 0
VISUAL DISTURBANCES: NOT PRESENT
VISUAL DISTURBANCES: NOT PRESENT
ANXIETY: NO ANXIETY, AT EASE
TREMOR: NO TREMOR
PAROXYSMAL SWEATS: 2
NAUSEA AND VOMITING: NO NAUSEA AND NO VOMITING
ANXIETY: NO ANXIETY, AT EASE
AUDITORY DISTURBANCES: NOT PRESENT
HEADACHE, FULLNESS IN HEAD: NOT PRESENT
TREMOR: NO TREMOR
TOTAL SCORE: 4
PULSE: 86
NAUSEA AND VOMITING: NO NAUSEA AND NO VOMITING
ORIENTATION AND CLOUDING OF SENSORIUM: ORIENTED AND CAN DO SERIAL ADDITIONS
HEADACHE, FULLNESS IN HEAD: NOT PRESENT
AUDITORY DISTURBANCES: NOT PRESENT
PAROXYSMAL SWEATS: BEADS OF SWEAT OBVIOUS ON FOREHEAD
BLOOD PRESSURE: 123/70
ORIENTATION AND CLOUDING OF SENSORIUM: ORIENTED AND CAN DO SERIAL ADDITIONS
ORIENTATION AND CLOUDING OF SENSORIUM: ORIENTED AND CAN DO SERIAL ADDITIONS
HOW MANY STANDARD DRINKS CONTAINING ALCOHOL DO YOU HAVE ON A TYPICAL DAY: 5 OR 6
TREMOR: NO TREMOR
VISUAL DISTURBANCES: NOT PRESENT
ANXIETY: MILDLY ANXIOUS
HOW OFTEN DURING THE LAST YEAR HAVE YOU BEEN UNABLE TO REMEMBER WHAT HAPPENED THE NIGHT BEFORE BECAUSE YOU HAD BEEN DRINKING: LESS THAN MONTHLY
AUDITORY DISTURBANCES: NOT PRESENT
ORIENTATION AND CLOUDING OF SENSORIUM: ORIENTED AND CAN DO SERIAL ADDITIONS
AGITATION: SOMEWHAT MORE THAN NORMAL ACTIVITY
PAROXYSMAL SWEATS: BEADS OF SWEAT OBVIOUS ON FOREHEAD
TOTAL SCORE: 0
HEADACHE, FULLNESS IN HEAD: NOT PRESENT
NAUSEA AND VOMITING: NO NAUSEA AND NO VOMITING
AUDITORY DISTURBANCES: NOT PRESENT
HOW OFTEN DURING THE LAST YEAR HAVE YOU FOUND THAT YOU WERE NOT ABLE TO STOP DRINKING ONCE YOU HAD STARTED: WEEKLY
AGITATION: NORMAL ACTIVITY
HOW OFTEN DURING THE LAST YEAR HAVE YOU NEEDED AN ALCOHOLIC DRINK FIRST THING IN THE MORNING TO GET YOURSELF GOING AFTER A NIGHT OF HEAVY DRINKING: LESS THAN MONTHLY
ANXIETY: NO ANXIETY, AT EASE
HEADACHE, FULLNESS IN HEAD: NOT PRESENT
NAUSEA AND VOMITING: NO NAUSEA AND NO VOMITING
AUDITORY DISTURBANCES: NOT PRESENT
TOTAL SCORE: 0
AGITATION: NORMAL ACTIVITY
AUDIT-C TOTAL SCORE: 8
TREMOR: NO TREMOR
NAUSEA AND VOMITING: NO NAUSEA AND NO VOMITING

## 2024-11-01 ASSESSMENT — ACTIVITIES OF DAILY LIVING (ADL)
DRESSING YOURSELF: INDEPENDENT
HEARING - LEFT EAR: FUNCTIONAL
TOILETING: INDEPENDENT
LACK_OF_TRANSPORTATION: NO
JUDGMENT_ADEQUATE_SAFELY_COMPLETE_DAILY_ACTIVITIES: YES
LACK_OF_TRANSPORTATION: NO
FEEDING YOURSELF: INDEPENDENT
LACK_OF_TRANSPORTATION: NO
WALKS IN HOME: NEEDS ASSISTANCE
ADEQUATE_TO_COMPLETE_ADL: YES
HEARING - RIGHT EAR: FUNCTIONAL
BATHING: INDEPENDENT
PATIENT'S MEMORY ADEQUATE TO SAFELY COMPLETE DAILY ACTIVITIES?: YES
GROOMING: INDEPENDENT

## 2024-11-01 ASSESSMENT — COGNITIVE AND FUNCTIONAL STATUS - GENERAL
DRESSING REGULAR LOWER BODY CLOTHING: A LITTLE
MOBILITY SCORE: 19
HELP NEEDED FOR BATHING: A LITTLE
HELP NEEDED FOR BATHING: A LITTLE
DRESSING REGULAR LOWER BODY CLOTHING: A LITTLE
MOVING TO AND FROM BED TO CHAIR: A LITTLE
STANDING UP FROM CHAIR USING ARMS: A LITTLE
TOILETING: A LITTLE
WALKING IN HOSPITAL ROOM: A LITTLE
TURNING FROM BACK TO SIDE WHILE IN FLAT BAD: A LITTLE
DAILY ACTIVITIY SCORE: 21
WALKING IN HOSPITAL ROOM: A LITTLE
TOILETING: A LITTLE
PATIENT BASELINE BEDBOUND: NO
STANDING UP FROM CHAIR USING ARMS: A LITTLE
MOVING TO AND FROM BED TO CHAIR: A LITTLE
MOBILITY SCORE: 19
DRESSING REGULAR LOWER BODY CLOTHING: A LITTLE
HELP NEEDED FOR BATHING: A LITTLE
CLIMB 3 TO 5 STEPS WITH RAILING: A LITTLE
TOILETING: A LITTLE
DAILY ACTIVITIY SCORE: 21
MOBILITY SCORE: 19
CLIMB 3 TO 5 STEPS WITH RAILING: A LITTLE
STANDING UP FROM CHAIR USING ARMS: A LITTLE
DAILY ACTIVITIY SCORE: 21
MOVING TO AND FROM BED TO CHAIR: A LITTLE
WALKING IN HOSPITAL ROOM: A LITTLE
TURNING FROM BACK TO SIDE WHILE IN FLAT BAD: A LITTLE
TURNING FROM BACK TO SIDE WHILE IN FLAT BAD: A LITTLE
CLIMB 3 TO 5 STEPS WITH RAILING: A LITTLE

## 2024-11-01 ASSESSMENT — ENCOUNTER SYMPTOMS
WHEEZING: 1
FEVER: 0
VOMITING: 0
BLOOD IN STOOL: 0
DIARRHEA: 0
APPETITE CHANGE: 1
ABDOMINAL PAIN: 1
CHILLS: 0
FATIGUE: 1
SHORTNESS OF BREATH: 1
CONSTIPATION: 1
COUGH: 1
ABDOMINAL DISTENTION: 1

## 2024-11-01 ASSESSMENT — PAIN SCALES - GENERAL
PAINLEVEL_OUTOF10: 1
PAINLEVEL_OUTOF10: 0 - NO PAIN
PAINLEVEL_OUTOF10: 5 - MODERATE PAIN
PAINLEVEL_OUTOF10: 0 - NO PAIN
PAINLEVEL_OUTOF10: 7
PAINLEVEL_OUTOF10: 7
PAINLEVEL_OUTOF10: 2
PAINLEVEL_OUTOF10: 2
PAINLEVEL_OUTOF10: 0 - NO PAIN

## 2024-11-01 ASSESSMENT — PAIN - FUNCTIONAL ASSESSMENT
PAIN_FUNCTIONAL_ASSESSMENT: 0-10

## 2024-11-01 ASSESSMENT — COLUMBIA-SUICIDE SEVERITY RATING SCALE - C-SSRS
1. IN THE PAST MONTH, HAVE YOU WISHED YOU WERE DEAD OR WISHED YOU COULD GO TO SLEEP AND NOT WAKE UP?: NO
2. HAVE YOU ACTUALLY HAD ANY THOUGHTS OF KILLING YOURSELF?: NO
6. HAVE YOU EVER DONE ANYTHING, STARTED TO DO ANYTHING, OR PREPARED TO DO ANYTHING TO END YOUR LIFE?: NO

## 2024-11-01 ASSESSMENT — PATIENT HEALTH QUESTIONNAIRE - PHQ9
2. FEELING DOWN, DEPRESSED OR HOPELESS: NOT AT ALL
SUM OF ALL RESPONSES TO PHQ9 QUESTIONS 1 & 2: 0
1. LITTLE INTEREST OR PLEASURE IN DOING THINGS: NOT AT ALL

## 2024-11-01 NOTE — CARE PLAN
The patient's goals for the shift include surgery and pain control    The clinical goals for the shift include pain will be controlled <6

## 2024-11-01 NOTE — PROGRESS NOTES
11/01/24 1333   Discharge Planning   Assistance Needed DEBBY met with pt and pt wife at bedside to discuss discharge plan. [facility] and gets prescribed medications there as well. DEBBY called and spoke with [facility] and made aware that pt is in our care. [facility] also [facility] and made aware of pt [facility]- notification number [id number]. DEBBY faxed clinical records to pt PCP office to Layla Tyson [phone]. DEBBY also discussed plan for etoh cessation. Pt states that he does not plan to quit drinking and just plans to cut back. Pt agreeable to contacting DEBBY if he wants resources to assist with this.

## 2024-11-01 NOTE — OP NOTE
Exploration Laparoscopy; PEREZ PATCH ULCER PERFORATION Operative Note     Date: 2024  OR Location: [facility] OR    Name: Taqueria Montano, : 1960, Age: 64 y.o., MRN: [id number], Sex: male    Diagnosis  * No Diagnosis Codes entered * * No Diagnosis Codes entered *     Procedures  Exploration Laparoscopy; PEREZ PATCH ULCER PERFORATION  35820 - NM LAPS ABD PRTM&OMENTUM DX W/WO SPEC BR/WA SPX      Surgeons      * Beverly Burton - Primary    Resident/Fellow/Other Assistant:  Surgeons and Role:  * No surgeons found with a matching role *    Staff:   Circulator: Sridevi  Surgical Assistant: Seymour Diggs Person: Elvin    Anesthesia Staff: Anesthesiologist: Ag Cueva DO  CRNA: EDWIGE Johnson-CRNA    Procedure Summary  Anesthesia: General  ASA: IV  Estimated Blood Loss: 1mL  Intra-op Medications:   Administrations occurring from 0300 to 0510 on 24:   Medication Name Total Dose   sodium chloride 0.9 % irrigation solution 3,000 mL   BUPivacaine HCl (Marcaine) 0.5 % (5 mg/mL) injection 20 mL   albuterol 2.5 mg /3 mL (0.083 %) nebulizer solution 2.5 mg Cannot be calculated   dexAMETHasone (Decadron) injection 4 mg/mL 8 mg   fentaNYL PF 0.05 mg/mL 50 mcg   folic acid 1 mg in dextrose 5% 50 mL IV Cannot be calculated   HYDROmorphone (Dilaudid) injection 0.2 mg Cannot be calculated   HYDROmorphone (Dilaudid) injection 0.4 mg Cannot be calculated   ipratropium-albuteroL (Duo-Neb) 0.5-2.5 mg/3 mL nebulizer solution 3 mL Cannot be calculated   LR infusion 341.67 mL   lidocaine (cardiac) injection 2% prefilled syringe 50 mg   methylPREDNISolone sod succinate (SOLU-Medrol) 40 mg/mL injection 40 mg Cannot be calculated   midazolam (Versed) 1 mg/1 mL 2 mg   nicotine (Nicoderm CQ) 21 mg/24 hr patch 1 patch Cannot be calculated   ondansetron (Zofran) 2 mg/mL injection 4 mg   phenylephrine 40 mcg/mL syringe 10 mL 280 mcg   propofol (Diprivan) infusion 10 mg/mL 150 mg   rocuronium (ZeMuron) 50 mg/5 mL injection 100 mg    sugammadex (Bridion) 200 mg/2 mL injection 200 mg   thiamine (Vitamin B-1) tablet 100 mg Cannot be calculated   thiamine (Vitamin B1) injection 100 mg Cannot be calculated              Anesthesia Record               Intraprocedure I/O Totals          Intake    Propofol Drip 0.00 mL    The total shown is the total volume documented since Anesthesia Start was filed.    LR infusion 1000.00 mL    Total Intake 1000 mL       Output    Urine 350 mL    Est. Blood Loss 1 mL    Total Output 351 mL       Net    Net Volume 649 mL          Specimen: No specimens collected              Drains and/or Catheters:   Closed/Suction Drain RUQ Bulb 19 Fr. (Active)       NG/OG/Feeding Tube NG - [place] sump 16 Fr Right nostril (Active)   Site Assessment Clean;Dry;Intact 11/01/24 0150       Urethral Catheter (Active)       Tourniquet Times:         Implants:     Findings: Gastric ulcer    Indications: Taqueria Montano is an 64 y.o. male who is having surgery for * No pre-op diagnosis entered *.  Perforated viscus.  The patient apparently had not felt well for several days had abdominal pain but today it got significantly worse he went to the emergency room elsewhere and had a workup which revealed free air on a CT scan and he was eventually transferred to [facility].  Unfortunately due to weather he could not come by helicopter.    The patient was seen in the preoperative area. The risks, benefits, complications, treatment options, non-operative alternatives, expected recovery and outcomes were discussed with the patient. The possibilities of reaction to medication, pulmonary aspiration, injury to surrounding structures, bleeding, recurrent infection, the need for additional procedures, failure to diagnose a condition, and creating a complication requiring transfusion or operation were discussed with the patient. The patient concurred with the proposed plan, giving informed consent.  The site of surgery was properly noted/marked if necessary  per policy. The patient has been actively warmed in preoperative area. Preoperative antibiotics have been ordered and given within 1 hours of incision. Venous thrombosis prophylaxis have been ordered including bilateral sequential compression devices    Procedure Details: The patient was brought to the operating theater and placed on the operating table in the supine position after sufficient general anesthesia was administered the patient's abdomen was prepped and draped in the usual sterile manner.  The umbilicus was approached an incision was made here the abdomen was entered in an open technique a Gonzalez port was placed and a CO2 pneumoperitoneum was slowly achieved.  We then inserted the laparoscope and the abdomen was inspected the patient had bile and exudative material all in the upper abdomen when we reflected the transverse colon and omentum back down off of the greater curvature of the stomach we discovered a large opening in the greater curve if the and in the antrum on the anterior wall of the stomach.  There was some omentum from the lesser sac that was already trying to form a patch over this but we carried out copious irrigation in the upper quadrants of first and then suction all of this is dry as possible and then used 0 Vicryl sutures to suture close the ulcer in an interrupted fashion this was done laparoscopically and then omentum was placed over top of this as a patch suturing the omentum to the stomach wall and then also suturing omentum to itself the omentum from the lesser sac region.  When this was completed we continued more irrigation suctioned till dry and then placed a 19 Green[place]ic channel drain overlying this region and brought out through one of the port sites.  This was later secured with a 2-0 Prolene suture.  The other port sites were removed under direct vision then the fascia at the umbilicus was repaired using 0 Vicryl in a figure-of-eight fashion.  3-0 and 4-0 Vicryl were used for  the skin incisions and Dermabond.  He tolerated this well.  He remained hemodynamically stable during the procedure.  Complications:  None; patient tolerated the procedure well.    Disposition: PACU - hemodynamically stable.  Condition: stable         Additional Details:     Attending Attestation:     Beverly Burton  Phone Number: [phone]       No

## 2024-11-01 NOTE — ED PROCEDURE NOTE
Procedure  Critical Care    Performed by: Eun Wilkins MD  Authorized by: Eun Wilkins MD    Critical care provider statement:     Critical care time (minutes):  17    Critical care start time:  10/31/2024 7:51 PM    Critical care time was exclusive of:  Separately billable procedures and treating other patients    Critical care was necessary to treat or prevent imminent or life-threatening deterioration of the following conditions: perforated ulcer.    Critical care was time spent personally by me on the following activities:  Development of treatment plan with patient or surrogate, discussions with consultants, evaluation of patient's response to treatment, examination of patient, obtaining history from patient or surrogate, ordering and performing treatments and interventions, ordering and review of laboratory studies, ordering and review of radiographic studies, pulse oximetry, re-evaluation of patient's condition and review of old charts    Care discussed with: accepting provider at another facility                 Eun Wilkins MD  10/31/24 2060

## 2024-11-01 NOTE — PROGRESS NOTES
11/01/24 1500   Discharge Planning   Living Arrangements Spouse/significant other  (Lives with spouse)   Support Systems Spouse/significant other   Assistance Needed Patient is A&OX3, independent in ADLs, ambulates without assistive devices, drives, and wears no O2, CPAP or Bipap at baseline. No active HC agency.   Type of Residence Private residence   Number of Stairs to Enter Residence 5   Number of Stairs Within Residence 0   Do you have animals or pets at home? Yes   Type of Animals or Pets 4 dogs   Who is requesting discharge planning? Provider   Home or Post Acute Services None   Expected Discharge Disposition Home  (Home, no needs-denied need for HHC)   Does the patient need discharge transport arranged? No   Financial Resource Strain   How hard is it for you to pay for the very basics like food, housing, medical care, and heating? Not very   Housing Stability   In the last 12 months, was there a time when you were not able to pay the mortgage or rent on time? N   In the past 12 months, how many times have you moved where you were living? 1   At any time in the past 12 months, were you homeless or living in a shelter (including now)? N   Transportation Needs   In the past 12 months, has lack of transportation kept you from medical appointments or from getting medications? no   In the past 12 months, has lack of transportation kept you from meetings, work, or from getting things needed for daily living? No

## 2024-11-01 NOTE — PROGRESS NOTES
"Taqueria Montano is a 64 y.o. male on day 0 of admission presenting with progressively worsening abdominal pain over the past week.  Patient was initially evaluated by Dr. Santos.  Please see his note for full details of the H&P.  Patient was signed out to me at change of shift pending CT results.      Subjective   Patient states that his pain is somewhat improved with pain medication.  He states his breathing is somewhat improved after the breathing treatment.       Objective     Physical Exam  Constitutional:       General: He is not in acute distress.  HENT:      Head: Normocephalic and atraumatic.   Eyes:      Extraocular Movements: Extraocular movements intact.      Pupils: Pupils are equal, round, and reactive to light.   Cardiovascular:      Rate and Rhythm: Normal rate and regular rhythm.   Pulmonary:      Effort: Pulmonary effort is normal.      Breath sounds: No wheezing.   Abdominal:      General: Abdomen is flat. Bowel sounds are normal.      Tenderness: There is generalized abdominal tenderness. There is guarding. There is no rebound. Negative signs include Guillen's sign.      Comments: Firm to palpation   Skin:     General: Skin is warm and dry.      Capillary Refill: Capillary refill takes less than 2 seconds.   Neurological:      General: No focal deficit present.      Mental Status: He is alert.   Psychiatric:         Mood and Affect: Mood normal.         Last Recorded Vitals  Blood pressure 96/73, pulse 87, temperature 36.7 °C (98 °F), temperature source Temporal, resp. rate (!) 21, height 1.753 m (5' 9\"), weight 73.6 kg (162 lb 4.1 oz), SpO2 96%.  Intake/Output last 3 Shifts:  No intake/output data recorded.    Relevant Results               Labs Reviewed   CBC WITH AUTO DIFFERENTIAL - Abnormal       Result Value    WBC 9.9      nRBC 0.0      RBC 4.90      Hemoglobin 16.6      Hematocrit 46.0      MCV 94      MCH 33.9      MCHC 36.1 (*)     RDW 12.4      Platelets 479 (*)     Neutrophils % 76.1   "    Immature Granulocytes %, Automated 0.2      Lymphocytes % 16.3      Monocytes % 5.2      Eosinophils % 1.4      Basophils % 0.8      Neutrophils Absolute 7.50      Immature Granulocytes Absolute, Automated 0.02      Lymphocytes Absolute 1.61      Monocytes Absolute 0.51      Eosinophils Absolute 0.14      Basophils Absolute 0.08     COMPREHENSIVE METABOLIC PANEL - Abnormal    Glucose 79      Sodium 123 (*)     Potassium 3.9      Chloride 90 (*)     Bicarbonate 24      Anion Gap 13      Urea Nitrogen 9      Creatinine 0.87      eGFR >90      Calcium 9.3      Albumin 4.5      Alkaline Phosphatase 69      Total Protein 8.1      AST 26      Bilirubin, Total 0.4      ALT 15     LACTATE - Abnormal    Lactate 2.6 (*)     Narrative:     Venipuncture immediately after or during the administration of Metamizole may lead to falsely low results. Testing should be performed immediately prior to Metamizole dosing.   ALCOHOL - Abnormal    Alcohol 108 (*)    LACTATE - Abnormal    Lactate 2.3 (*)     Narrative:     Venipuncture immediately after or during the administration of Metamizole may lead to falsely low results. Testing should be performed immediately prior to Metamizole dosing.   LIPASE - Normal    Lipase 62      Narrative:     Venipuncture immediately after or during the administration of Metamizole may lead to falsely low results. Testing should be performed immediately prior to Metamizole dosing.   PROTIME-INR - Normal    Protime 12.5      INR 1.1     TROPONIN I, HIGH SENSITIVITY - Normal    Troponin I, High Sensitivity 7      Narrative:     Less than 99th percentile of normal range cutoff-  Female and children under 18 years old <14 ng/L; Male <21 ng/L: Negative  Repeat testing should be performed if clinically indicated.     Female and children under 18 years old 14-50 ng/L; Male 21-50 ng/L:  Consistent with possible cardiac damage and possible increased clinical   risk. Serial measurements may help to assess extent  of myocardial damage.     >50 ng/L: Consistent with cardiac damage, increased clinical risk and  myocardial infarction. Serial measurements may help assess extent of   myocardial damage.      NOTE: Children less than 1 year old may have higher baseline troponin   levels and results should be interpreted in conjunction with the overall   clinical context.     NOTE: Troponin I testing is performed using a different   testing methodology at [facility] than at other   [facility]. Direct result comparisons should only   be made within the same method.   BLOOD CULTURE   BLOOD CULTURE   GRAY TOP    Extra Tube Hold for add-ons.     URINALYSIS WITH REFLEX CULTURE AND MICROSCOPIC    Narrative:     The following orders were created for panel order Urinalysis with Reflex Culture and Microscopic.  Procedure                               Abnormality         Status                     ---------                               -----------         ------                     Urinalysis with Reflex C...[[id number]]                                                 Extra Urine Gray Tube[[id number]]                                                         Please view results for these tests on the individual orders.   URINALYSIS WITH REFLEX CULTURE AND MICROSCOPIC   EXTRA URINE GRAY TUBE     XR chest 1 view   Final Result   1. Mild left basilar atelectasis/scarring or infiltrate. Recommend   clinical correlation and follow-up with PA and lateral chest x-rays   to document resolution.   2. Tortuous a possibly ectatic aorta with wall calcification but not   significantly changed since previous exam accounting for difference   in technique of imaging. If there is clinical concern for acute   aortic pathology or pulmonary embolic disease, further evaluation   with chest CT is recommended for better assessment.   3. Lucency within the right subdiaphragmatic space is abnormal and   may reflect increased in size of intraperitoneal  air such as related   to previous surgery or bowel perforation. Recommend clinical   correlation and follow-up.             SUPPLEMENTAL INFORMATION:   Notifi message was left for MATTHIAS EARL regarding this exam by Dr. Guillory on 10/31/2024 at approximately 20:29 hours.             Critical Finding:  See findings. Notification was initiated on   10/31/2024 at 8:28 pm by  Nuno Guillory.  (**-OCF-**) Instructions:        Signed by: Nuno Guillory 10/31/2024 8:29 PM   Dictation workstation:   [id number]      CT abdomen pelvis w IV contrast   Final Result   Wall thickening and irregularity of the stomach. There is 4 cm x 1.4   cm irregular heterogeneous area of hypoattenuation in the wall of the   greater curvature of the stomach on coronal image 35 of 119   concerning for underlying gastric ulcer. There is evidence of free   abdominal air compatible with viscus perforation and the findings are   concerning for perforated gastric ulcer. Other source of perforation   of the bowel is also not excluded. Heterogeneous hyperdensity in the   stomach lumen may correspond to hemorrhage. Small amount of free   fluid in the abdomen and pelvis likely relating to the perforation.        Foci of peripheral air in segment of the proximal transverse colon   the right abdomen and pneumatosis ischemia is not excluded.        Underdistention versus urinary bladder wall thickening; please   correlate urinalysis to exclude cystitis.        MACRO:   Alec Llamas discussed the significance and urgency of this critical   finding by telephone with the emergency room physician on 10/31/2024   at 7:55 pm.  (**-RCF-**) Findings:  See findings.        Signed by: Alec Llamas 10/31/2024 7:55 PM   Dictation workstation:   [id number]        ED Medication Administration from 10/31/2024 1651 to 10/31/2024 2046         Date/Time Order Dose Route Action Action by     10/31/2024 1754 EDT ondansetron (Zofran) injection 4 mg 4 mg intravenous Given  Yao, S     10/31/2024 1754 EDT sodium chloride 0.9 % bolus 1,000 mL 1,000 mL intravenous New Bag Yao, S     10/31/2024 1755 EDT HYDROmorphone (Dilaudid) injection 1 mg 1 mg intravenous Given Yao, S     10/31/2024 1845 EDT iohexol (OMNIPaque) 350 mg iodine/mL solution 75 mL 75 mL intravenous Given KIT Liriano     10/31/2024 1924 EDT sodium chloride 0.9 % bolus 1,000 mL 0 mL intravenous Stopped SPENCER Ndiaye     10/31/2024 1930 EDT sodium chloride 0.9 % bolus 1,000 mL 1,000 mL intravenous New Bag SPENCER Ndiaye     10/31/2024 1936 EDT ipratropium-albuteroL (Duo-Neb) 0.5-2.5 mg/3 mL nebulizer solution 3 mL 3 mL nebulization Given Keara, K     10/31/2024 1946 EDT ipratropium-albuteroL (Duo-Neb) 0.5-2.5 mg/3 mL nebulizer solution 3 mL 3 mL nebulization Given Dildanna, K     10/31/2024 1951 EDT ipratropium-albuteroL (Duo-Neb) 0.5-2.5 mg/3 mL nebulizer solution 3 mL 3 mL nebulization Not Given Keara, K     10/31/2024 2015 EDT pantoprazole (ProtoNix) injection 40 mg 40 mg intravenous Given SPENCER Ndiaye     10/31/2024 2015 EDT piperacillin-tazobactam (Zosyn) 3.375 g in dextrose (iso) IV 50 mL 3.375 g intravenous New Bag SPENCER Ndiaye     10/31/2024 2027 EDT nicotine (Nicoderm CQ) 21 mg/24 hr patch 1 patch 1 patch transdermal Medication Applied SPENCER Ndiaye          1954 -urgent report from radiology regarding patient's CAT scan results consistent with perforated gastric ulcer with free intraperitoneal air    2006 --transfer center notified    2025 --discussed with patient and family.  They understand the urgency of the situation and are agreeable to transfer    2031 --Case was discussed with Dr. Burton neurosurgery at [facility] including patient's past medical history, presenting signs and symptoms, current clinical condition and test results.  She has graciously accept the patient for transfer.    2049 -- patient and family updated       2050 --this was discussed by phone with EDWIGE Yoder for Dr. Medina at [facility] including patient's  past medical history, presenting signs and symptoms, current clinical condition and test results.  She accepts patient for transfer.    2103 -- due to urgency of patient's condition, transfer center is arranging for aeromedical transport      Assessment/Plan   Assessment & Plan  Acute gastric ulcer with perforation (Multi)    Chronic obstructive pulmonary disease, unspecified COPD type (Multi)    Lactate blood increase    Patient presents emergency department above history and physical.  Concern for acute abdomen given patient's presentation and physical exam.  Patient given IV Dilaudid for pain and had transient episode of hypotension that has improved with IV fluids.  Lactate initially elevated 2.6 improving to 2.3 following initial fluid bolus.  IV hydration continuing.    CT imaging obtained and read by radiology as acute perforated gastric ulcer with free intraperitoneal air.  Chest x-ray also obtained and read by radiology free air under the diaphragm.  Patient medicated with Protonix and Zosyn.    Explained to patient and family that he will require urgent transfer for evaluation and surgical intervention.  They are agreeable to transfer.  Case has been discussed with Dr. Burton of general surgery and she Calli she has accepted patient for transfer. She requests hospitalist service as admitting service. Case discussed with Tayla GIBBONS for hospitalist service. She accepts transfer.       I spent 17 minutes in the critical care of this patient.      Eun Wilkins MD

## 2024-11-01 NOTE — H&P
Subjective   Subjective:   HPI:  Taqueria Montano is a 64 y.o. male with a PMH of COPD, alcohol use disorder, tobacco use disorder, HTN, spinal stenosis of cervical region, neuropathy, who was transferred from [place] to [facility] for management of perforated gastric ulcer.    Per pt, he initially started having some abdominal pain about a week ago. He thought it was due to constipation as he was unable to have a BM. He reports he took a lot of ex-lax and other stool softeners/laxatives but was unable to have one. He denies vomiting, diarrhea but does say he has had a hard time tolerating food/drink over the past week. His pain kept getting worse instead of improving so he decided he needed to get evaluated. He denies any previous similar episodes. He denies fevers, chills, recent sick contacts that he is aware of.     He does also report feeling more fatigued, short of breath. He says he has COPD and has been using his inhalers at home. Reports some increased coughing from baseline, nonproductive and that he is having a harder time catching his breath. Inhalers have not been controlling it well.    Pt reports he generally drinks between 4-6 beers, sometimes a little more. Denies drinking any liquor or other alcohol in addition. Denies recreational drug use. He is a smoker and smokes 2ppd.    Of note, NG was able to be placed when he arrived to the floor, suction was started.      ED COURSE:  Vitals: Temp 98 F, HR 81, RR 20, /64, SpO2 90% on 3L NC      Labs:   - CBC: WBC 9.9. Plt 479   - CMP: Na 123, Cl 90  - Lactate: 2.6 -> 2.3   - Lipase: 62  - Troponin: 7   - Coags: PT 12.5, INR 1.1   - Ethanol level: 108    Imaging:   - CXR: Mild left basilar atelectasis/scarring or infiltrate. Tortuous a possibly ectatic aorta with wall calcification but not significantly changed since previous exam accounting for difference in technique of imaging. Lucency within the right subdiaphragmatic space is abnormal and may reflect  increased in size of intraperitoneal air such as related to previous surgery or bowel perforation.   - CT abd/pel w/ IV con showed wall thickening and irregularity of the stomach. There is 4 cm x 1.4 cm irregular heterogeneous area of hypoattenuation in the wall of the   greater curvature of the stomach on coronal image 35 of 119 concerning for underlying gastric ulcer. There is evidence of free abdominal air compatible with viscus perforation and the findings are concerning for perforated gastric ulcer. Other source of perforation   of the bowel is also not excluded. Heterogeneous hyperdensity in the stomach lumen may correspond to hemorrhage. Small amount of free fluid in the abdomen and pelvis likely relating to the perforation. Foci of peripheral air in segment of the proximal transverse colon the right abdomen and pneumatosis ischemia is not excluded. Under distention versus urinary bladder wall thickening; please correlate urinalysis to exclude cystitis.   - EKG: Normal sinus rhythm, HR 80 bpm, non-ischemic     Micro:   - Blood culture: Collected     Interventions: NS 2L and stared on NS @ 200cc/hr. Given Dilaudid 1mg IV, Fentanyl 25mcg IV, Zofran 4mg IV, Protonix 40mg IV, Zosyn 3.375g IV, Duoneb x1. ED attempted to place NG tube but was unsuccessful. Contacted surgery (Dr. Burton) who accepted the patient and agreed to take to surgery.    Medications: reviewed.    Code Status: Full    ED Course:   Vitals:  BP 90/71 (BP Location: Right arm, Patient Position: Lying)   Pulse 101   Temp (!) 38.2 °C (100.8 °F) (Temporal)   Resp (!) 31   Wt 73.7 kg (162 lb 7.7 oz)   SpO2 96%     Labs:  Lab Results   Component Value Date    WBC 9.9 10/31/2024    HGB 16.6 10/31/2024    HCT 46.0 10/31/2024    MCV 94 10/31/2024     (H) 10/31/2024     Lab Results   Component Value Date    GLUCOSE 79 10/31/2024    CALCIUM 9.3 10/31/2024     (L) 10/31/2024    K 3.9 10/31/2024    CO2 24 10/31/2024    CL 90 (L) 10/31/2024  "   BUN 9 10/31/2024    CREATININE 0.87 10/31/2024     Lab Results   Component Value Date    TROPHS 7 10/31/2024   No results found for: \"BNP\"No results found for: \"DDIMERVTE\"  Imaging:  XR chest 1 view   Final Result   NG/OG tube terminates over the gastric fundus.        Similar appearance of pneumoperitoneum in the right upper quadrant.        MACRO:   None.        Signed by: Stanley Harris 11/1/2024 1:50 AM   Dictation workstation:   [id number]         Interventions:  Medications   amLODIPine (Norvasc) tablet 10 mg (has no administration in time range)   aspirin chewable tablet 81 mg ( oral Dose Auto Held 11/5/24 0900)   baclofen (Lioresal) tablet 10 mg (has no administration in time range)   fluticasone (Flonase) nasal spray 2 spray (has no administration in time range)   gabapentin (Neurontin) capsule 600 mg (has no administration in time range)   ipratropium-albuteroL (Duo-Neb) 0.5-2.5 mg/3 mL nebulizer solution 3 mL (has no administration in time range)   meloxicam (Mobic) tablet 15 mg ( oral Dose Auto Held 11/5/24 0700)   albuterol 90 mcg/actuation inhaler 2 puff ( inhalation Held by provider 11/1/24 0152)   enoxaparin (Lovenox) syringe 40 mg ( subcutaneous Dose Auto Held 11/5/24 0215)   polyethylene glycol (Glycolax, Miralax) packet 17 g (has no administration in time range)   acetaminophen (Tylenol) tablet 650 mg (has no administration in time range)   pantoprazole (ProtoNix) injection 40 mg (has no administration in time range)   piperacillin-tazobactam (Zosyn) 4.5 g in dextrose (iso)  mL (has no administration in time range)   ipratropium-albuteroL (Duo-Neb) 0.5-2.5 mg/3 mL nebulizer solution 3 mL (has no administration in time range)   ipratropium-albuteroL (Duo-Neb) 0.5-2.5 mg/3 mL nebulizer solution 3 mL (has no administration in time range)   methylPREDNISolone sod succinate (SOLU-Medrol) 40 mg/mL injection 40 mg (has no administration in time range)   multivitamin with minerals 1 tablet " (has no administration in time range)   thiamine (Vitamin B1) injection 100 mg (has no administration in time range)   thiamine (Vitamin B-1) tablet 100 mg (has no administration in time range)   LORazepam (Ativan) injection 0.5 mg (has no administration in time range)     Or   LORazepam (Ativan) injection 1 mg (has no administration in time range)     Or   LORazepam (Ativan) injection 2 mg (has no administration in time range)   folic acid 1 mg in dextrose 5% 50 mL IV (has no administration in time range)   lidocaine 2 % mucosal jelly (Uro-Jet) 1 Application (1 Application urethral Given 11/1/24 0121)       Past Medical History:  He has a past medical history of Melena (09/18/2014) and Personal history of colonic polyps (10/02/2014).    Past Surgical History:  He has a past surgical history that includes Hernia repair (09/11/2014) and Colonoscopy w/ polypectomy (10/02/2014).    Social History:  He reports that he has been smoking cigarettes. He has never used smokeless tobacco. He reports current alcohol use. He reports that he does not use drugs.    Family History:  No family history on file.  Allergies:  Patient has no known allergies.    Home Medications:  Medications Prior to Admission   Medication Sig Dispense Refill Last Dose/Taking   • albuterol 90 mcg/actuation inhaler Inhale 2 puffs every 6 hours if needed.   10/31/2024   • amLODIPine (Norvasc) 10 mg tablet Take 1 tablet (10 mg) by mouth once daily.   10/31/2024   • aspirin 81 mg chewable tablet Chew 1 tablet (81 mg) once daily.   10/31/2024 Morning   • baclofen (Lioresal) 10 mg tablet Take 1 tablet (10 mg) by mouth 3 times a day as needed.   10/31/2024   • cholecalciferol (Vitamin D-3) 25 MCG (1000 UT) capsule Take 1 capsule (25 mcg) by mouth once daily.   10/31/2024   • diclofenac sodium (Voltaren) 1 % gel Apply 4.5 inches (4 g) topically 4 times a day.   10/31/2024   • fluticasone (Flonase) 50 mcg/actuation nasal spray 2 sprays by Does not apply route  "once daily.   10/31/2024   • gabapentin (Neurontin) 300 mg capsule Take 2 capsules (600 mg) by mouth 3 times a day.   10/31/2024   • ipratropium-albuteroL (Combivent Respimat)  mcg/actuation inhaler Inhale 1 puff 4 times a day.   10/31/2024   • meloxicam (Mobic) 15 mg tablet Take 1 tablet (15 mg) by mouth once daily.   10/31/2024     Review Of Systems:  11-point ROS was performed and is negative except as noted below and in the HPI.     Review of Systems   Constitutional:  Positive for appetite change and fatigue. Negative for chills and fever.   Respiratory:  Positive for cough, shortness of breath and wheezing.    Gastrointestinal:  Positive for abdominal distention, abdominal pain and constipation. Negative for blood in stool, diarrhea and vomiting.        Objective   Objective:     BP 90/71 (BP Location: Right arm, Patient Position: Lying)   Pulse 101   Temp (!) 38.2 °C (100.8 °F) (Temporal)   Resp (!) 31   Ht 1.753 m (5' 9.02\")   Wt 73.7 kg (162 lb 7.7 oz)   SpO2 96%   BMI 23.98 kg/m²     Physical Exam  Constitutional:       General: He is in acute distress.      Appearance: He is ill-appearing.   HENT:      Head: Normocephalic and atraumatic.   Eyes:      Conjunctiva/sclera: Conjunctivae normal.   Cardiovascular:      Rate and Rhythm: Regular rhythm. Tachycardia present.      Pulses: Normal pulses.      Heart sounds: Normal heart sounds.   Pulmonary:      Comments: Tachypneic, difficult to assess breath sounds d/t inc work of breathing/moaning  Abdominal:      General: There is distension.      Tenderness: There is abdominal tenderness.      Comments: TTP worst in epigastric/RUQ; bowel sounds difficult to auscultate d/t above; NG tube in place right nostril   Musculoskeletal:      Right lower leg: No edema.      Left lower leg: No edema.   Skin:     General: Skin is warm and dry.   Neurological:      General: No focal deficit present.      Mental Status: He is alert and oriented to person, place, " "and time.        Lab Work:     Lab Results   Component Value Date    WBC 9.9 10/31/2024    HGB 16.6 10/31/2024    HCT 46.0 10/31/2024    MCV 94 10/31/2024     (H) 10/31/2024     Lab Results   Component Value Date    GLUCOSE 79 10/31/2024    CALCIUM 9.3 10/31/2024     (L) 10/31/2024    K 3.9 10/31/2024    CO2 24 10/31/2024    CL 90 (L) 10/31/2024    BUN 9 10/31/2024    CREATININE 0.87 10/31/2024     No results found for: \"HGBA1C\", \"TSH\", \"VITD25\"  Cultures:   No results found for the last 90 days.    Images:     XR chest 1 view   Final Result   NG/OG tube terminates over the gastric fundus.        Similar appearance of pneumoperitoneum in the right upper quadrant.        MACRO:   None.        Signed by: Stanley Harris 11/1/2024 1:50 AM   Dictation workstation:   [id number]         Medications:     Scheduled Meds:  amLODIPine, 10 mg, oral, Daily  [Held by provider] aspirin, 81 mg, oral, Daily  [Held by provider] enoxaparin, 40 mg, subcutaneous, q24h  fluticasone, 2 spray, Each Nostril, Daily  folic acid, 1 mg, intravenous, Daily  gabapentin, 600 mg, oral, TID  ipratropium-albuteroL, 3 mL, nebulization, 4x daily  ipratropium-albuteroL, 3 mL, nebulization, q6h  [Held by provider] meloxicam, 15 mg, oral, Daily  methylPREDNISolone sodium succinate (PF), 40 mg, intravenous, Daily  multivitamin with minerals, 1 tablet, oral, Daily  pantoprazole, 40 mg, intravenous, Daily  piperacillin-tazobactam, 4.5 g, intravenous, q6h  [START ON 11/4/2024] thiamine, 100 mg, oral, Daily  thiamine, 100 mg, intravenous, Daily    Continuous Meds:     PRN Meds:  PRN medications: acetaminophen, [Held by provider] albuterol, baclofen, ipratropium-albuteroL, LORazepam **OR** LORazepam **OR** LORazepam, polyethylene glycol     Assessment & Plan:     Taqueria Montano is a 64 y.o. male with a PMH of COPD, alcohol use disorder, tobacco use disorder, HTN, spinal stenosis of cervical region, neuropathy. Admitted for management of " perforated gastric ulcer.     ACUTE MEDICAL ISSUES:  #Perforated gastric ulcer:   #Sepsis  #Elevated lactate  - Meets 3/4 SIRS: HR >90, temp >100.4, RR >20  - Lactate elevated to 2.3 on admission here, previous 2.6 at [facility]  - Likely 2/2 perforated gastric ulcer seen on CT abdomen/pelvis  - Blood cultures drawn at [facility], pending  - s/p 2L NS at [facility] and was started at 200mL/hr maintenance  - NG tube in place w/ suction  PLAN:  - Zosyn 4.5g q6  - NPO  - NG in place w/ suction  - Surgery with Dr. Burton - RCI class II: 1 point, 6.0% 30 cardiac day risk, medically optimized  - Protonix 40mg   -Follow-up blood cultures, UA ordered  - Trend lactate q4 until normalized  - Scheduled tylenol 650mg  - Telemetry monitoring while inpt    #Hyponatremia:   - Suspect 2/2 to beer potomania given hx, may have chronic component given alcohol consumption but no prior labs available  PLAN:  - Stat RFP ordered to re-evaluate as he has gotten quite a bit of fluid  - Urine and serum osmols, urine electrolytes ordered to evaluate etiology    #Acute hypoxic respiratory failure:   - Difficult to assess whether 2/2 to COPD exacerbation vs pain/abdominal distention, likely multifactorial  - Was initially hypoxic at [facility] and requiring supplemental O2 via nasal cannula - taken off when he got to floor w/o de-satting  PLAN:  - Duo-nebs q6 scheduled + q2 PRN  - Solu-medrol 40mg IV  - Encourage incentive spirometry    #Tortuous aorta  - Incidental finding on initial CXR  PLAN  - Can consider CT chest for further workup as outpatient    CHRONIC MEDICAL ISSUES:  #Alcohol use disorder - CIWA protocol initiated, thiamine, folate  #Nicotine use disorder - Nicotine patch   #Spinal stenosis, DDD cervical region - continue home gabapentin 600mg TID, meloxicam 15mg (holding)  #HTN - amlodipine 10mg  #Preventative - ASA 81mg daily (being held pending surgery)    Fluid: Replete PRN  Electrolytes: Replete PRN  Nutrition: NPO  GI ppx:  Protonix  DVT/PE ppx: Holding d/t surgery  Abx: Zosyn   IV Lines: PIV  O2: RA    Dispo: Admitted to stepdown unit for management of perforated gastric ulcer, estimated LOS >48 hours.    Aleshia Washington DO, PGY-1  Internal Medicine

## 2024-11-01 NOTE — HOSPITAL COURSE
Taqueria Montano is a 64 y.o. male with a PMH of COPD, alcohol use disorder, tobacco use disorder, HTN, spinal stenosis of cervical region, neuropathy, who was transferred from [place] to [facility] on 11/1/2024 for management of perforated gastric ulcer. Per pt, he initially started having some abdominal pain about a week ago. He thought it was due to constipation as he was unable to have a BM. His pain kept getting worse.      He did  also report feeling more fatigued, short of breath. He says he has COPD and has been using his inhalers at home. Reports some increased coughing from baseline, nonproductive and that he is having a harder time catching his breath. Inhalers have not been controlling it well. CXR in the ER showed possible consolidation, which could represent pneumonia. Patient was placed on Zosyn for abdominal infection which would also cover possible pneumonia.     Pt reports he generally drinks between 4-6 beers, sometimes a little more. Denies drinking any liquor or other alcohol in addition.  He is a smoker and smokes 2ppd.     Patient was admitted, started on Zosyn for antibiotic coverage of intra-abdominal infection and taken to the OR by surgery overnight 11/1 for anibal patch repair of perforated gastric ulcer. DANIELLE drain was placed. Patient with NG tube and DANIELLE tube in place post-operatively. Patient's breathing improved significantly after surgery, SOB likely partly 2/2 abdominal pain and distension.       Per surgery recommendation, NG tube out on 11/4 overnight. FL upper GI w KUB no leakage or obstruction. Advanced to full liquid diet per surgery recs tolerated diet well, so  diet advanced to regular diet.  Surgery also recommended H. pylori treatment which was initially started with clarithromycin, changed to tetracycline, metronidazole, bismuth, PPI protocol.  As patient was already on Zosyn until 11/7/2024, started metronidazole on 11/7/2024 and will continue for 7 more days with the other  medication in the regimen.   Surgery recommended to follow-up with Dr. Burton on 11/20/2024 at 2:30 PM at the [place] location, they already made the appointment.      Attempting to wean off O2, but home O2 evaluation showed he needs 2 L of oxygen at rest and 6 L on exertion.  We consulted pulmonology, recommended the above oxygen requirement suspecting patient had chronic hypoxemia or low normal oxygenation which now worse in the setting of abdominal surgery with possible elements of atelectasis.  Also recommended continue incentive spirometry and DuoNeb.  He also needs further evaluation as outpatient so need [facility] .  Physical therapy recommended low intensity therapy.  We are discharging the patient to home with PT OT referral.

## 2024-11-01 NOTE — SIGNIFICANT EVENT
SENIOR RESIDENT ATTESTATION TO H&P     Taqueria Montano is a 64 y.o. male with a PMH of COPD, alcohol use disorder, tobacco use disorder, HTN, spinal stenosis of cervical region, neuropathy, who was transferred from [place] to [facility] for management of perforated gastric ulcer.  Per patient, started to have abdominal pain about one week ago. He has been unable to have a BM also. Denies any nausea, vomiting, or diarrhea. Also states that he has been SOB and coughing more. Does smoke 2 ppd and is unsure if this is related to his COPD or feeling SOB from the pain. Does drink 3-6 regular sized cans of beer daily.     In the ED, lactate was elevated at 2.6 and patient was hyponatremic with Na of 123. Imaging showed 4 cm x 1.4 cm gastric ulcer with perforation. Other source of perforation of the bowel is also not excluded. Also showed foci of peripheral air in segment of the proximal transverse colon the right abdomen- pneumatosis ischemia not excluded. Chest x-ray showed tortuous and possibly ectatic aorta with wall calcification. Aneurysm or dissection can not be excluded, recommended considering CT chest for further evaluation.     Surgery was contacted and Dr. Burton accepted the patient for transfer to [facility]. NGT placement was attempted prior to transfer but was unsuccessful. Patient given 2L NS, Zosyn 4.5g, Protonix 40mg IV, Duoneb x1, and transferred to [facility].     # Sepsis   # Acute perforated gastric ulcer   # Hyponatremia, likely 2/2 beer potomania   # AHRF 2/2 COPD exacerbation vs pain from perforated ulcer/abdominal distension   # Alcohol abuse   # Tobacco abuse     Plan:   - NGT placed on arrival, confirmed with CXR   - Surgery consulted, taken to OR   - Continue Zosyn   - Continue Protonix   - Blood and urine cultures ordered   - On CIWA w/ IV Ativan   - Serum osm, urine osm, urine lytes ordered   - Will avoid additional fluids for now as pt's Na has already corrected from 123 to 126 and unsure if this is  acute or chronic (no baseline Na, [facility])          PGY-3 Attestation: I saw and evaluated the patient.  I personally obtained the key and critical portions of the history and physical exam or was physically present for key and  critical portions performed by the resident. I reviewed the resident's documentation and discussed the patient with the resident.  I agree with the documentation as detailed in the note unless stated otherwise in this attestation.     Rea Barajas, DO   Internal Medicine, PGY-3  11/01/24 at 2:47 AM

## 2024-11-01 NOTE — PROGRESS NOTES
Pharmacy Medication History Review    Taqueria Montano is a 64 y.o. male admitted for Perforated ulcer (Multi). Pharmacy reviewed the patient's [id number] medications and allergies for accuracy.    The list below reflectives the updated PTA list. Please review each medication in order reconciliation for additional clarification and justification.    Prior to Admission Medications   Prescriptions Last Dose Informant Patient Reported? Taking?   albuterol 90 mcg/actuation inhaler 10/31/2024 Self Yes Yes   Sig: Inhale 2 puffs every 6 hours if needed.   amLODIPine (Norvasc) 10 mg tablet 10/31/2024 Self Yes Yes   Sig: Take 1 tablet (10 mg) by mouth once daily.   aspirin 81 mg chewable tablet 10/31/2024 Morning Self Yes Yes   Sig: Chew 1 tablet (81 mg) once daily.   baclofen (Lioresal) 10 mg tablet 10/31/2024 Self Yes Yes   Sig: Take 1 tablet (10 mg) by mouth 3 times a day as needed.   cholecalciferol (Vitamin D-3) 25 MCG (1000 UT) capsule 10/31/2024 Self Yes Yes   Sig: Take 1 capsule (25 mcg) by mouth once daily.   diclofenac sodium (Voltaren) 1 % gel 10/31/2024 Self Yes Yes   Sig: Apply 4.5 inches (4 g) topically 4 times a day.   fluticasone (Flonase) 50 mcg/actuation nasal spray 10/31/2024 Self Yes Yes   Si sprays by Does not apply route once daily.   gabapentin (Neurontin) 300 mg capsule 10/31/2024 Self Yes Yes   Sig: Take 2 capsules (600 mg) by mouth 3 times a day.   ipratropium-albuteroL (Combivent Respimat)  mcg/actuation inhaler 10/31/2024 Self Yes Yes   Sig: Inhale 1 puff 4 times a day.   meloxicam (Mobic) 15 mg tablet 10/31/2024 Self Yes Yes   Sig: Take 1 tablet (15 mg) by mouth once daily.   urea (Carmol) 40 % cream  Self Yes Yes   Sig: Apply 1 Application topically 2 times a day.      Facility-Administered Medications: None        The list below reflectives the updated allergy list. Please review each documented allergy for additional clarification and justification.  Allergies  Reviewed by  Ag Cueva, DO on 11/1/2024   No Known Allergies         Below are additional concerns with the patient's PTA list.  Patient states [facility].    Augusta Isaac

## 2024-11-01 NOTE — CONSULTS
"Reason For Consult  Perforated viscus    History Of Present Illness  Taqueria Montano is a 64 y.o. male presenting with perforated viscus the patient had abdominal pain for 5 days that got worse yesterday therefore he went to the emergency room [facility].  CAT scan results were available at 8:00 in the evening.  I was notified shortly after and requested immediate transfer.  He arrived at 1 AM..     Past Medical History  He has a past medical history of Chronic back pain, COPD (chronic obstructive pulmonary disease) (Multi), H/O tuberculosis (11/2014), HTN (hypertension), Melena (09/18/2014), Personal history of colonic polyps (10/02/2014), and Spinal stenosis, cervical region.  The patient smokes daily and has since he was 15 he drinks 6-8 beers a day.  He takes aspirin and also meloxicam for chronic pain.    Surgical History  He has a past surgical history that includes Hernia repair (09/11/2014); Colonoscopy w/ polypectomy (10/02/2014); and Cataract extraction (Bilateral).     Social History  He reports that he has been smoking cigarettes. He has never used smokeless tobacco. He reports current alcohol use. He reports that he does not use drugs.    Family History  No family history on file.     Allergies  Patient has no known allergies.    Review of Systems  Negative     Physical Exam  Abdomen when he arrived was exquisitely tender.  The patient appears much older than his stated age.     Last Recorded Vitals  Blood pressure 114/62, pulse 84, temperature 36.7 °C (98.1 °F), temperature source Temporal, resp. rate 16, height 1.753 m (5' 9.02\"), weight 73.7 kg (162 lb 7.7 oz), SpO2 94%.    Relevant Results  Free air on CT scan probable gastric ulceration     Assessment/Plan     The patient is already status post surgery we did a laparoscopic repair of gastric ulcer repair primarily and then also a patch with omentum.  This was done laparoscopically.  I would recommend keeping the nasogastric tube through the weekend if " he looks good on Monday potentially doing a Gastrografin study on Monday and if there is no leak potentially removing the nasogastric tube at that point in time the drain in the right but right upper abdomen needs to stay for the duration.  Okay to start Lovenox tomorrow.    I spent 20 minutes in the professional and overall care of this patient.      Beverly Burton MD

## 2024-11-01 NOTE — SIGNIFICANT EVENT
"Taqueria Montano is a 64 y.o. male on day 0 of admission presenting with Perforated ulcer (Multi).    Subjective   Patient was awake in bed this morning. He was noted to have a fever at 0100 but temperature normalized by 0500. He has exploratory laparoscopy with Sukhdev patch in the early morning hours today. He endorsed mild abdominal pain. He denied chest pain, SOB, cough, nausea, and vomiting.      Objective     Physical Exam  Constitutional:       General: He is not in acute distress.  HENT:      Mouth/Throat:      Mouth: Mucous membranes are moist.      Pharynx: Oropharynx is clear.   Eyes:      Conjunctiva/sclera: Conjunctivae normal.   Cardiovascular:      Rate and Rhythm: Normal rate and regular rhythm.      Pulses: Normal pulses.      Heart sounds: Murmur heard.      Systolic murmur is present with a grade of 2/6.      No friction rub. No gallop.   Pulmonary:      Effort: No respiratory distress.      Breath sounds: Normal breath sounds. No wheezing, rhonchi or rales.   Abdominal:      General: There is distension.      Palpations: There is no mass.      Tenderness: There is generalized abdominal tenderness.      Comments: Drain in place in RUQ    Musculoskeletal:         General: No swelling or tenderness.   Skin:     General: Skin is warm and dry.      Capillary Refill: Capillary refill takes less than 2 seconds.   Neurological:      General: No focal deficit present.      Mental Status: He is alert. Mental status is at baseline.   Psychiatric:         Mood and Affect: Mood normal.         Behavior: Behavior normal.         Last Recorded Vitals  Blood pressure 110/64, pulse 84, temperature 36.4 °C (97.5 °F), temperature source Temporal, resp. rate 16, height 1.753 m (5' 9.02\"), weight 73.7 kg (162 lb 7.7 oz), SpO2 94%.  Intake/Output last 3 Shifts:  I/O last 3 completed shifts:  In: 1400 (19 mL/kg) [I.V.:1400 (19 mL/kg)]  Out: 631 (8.6 mL/kg) [Urine:550 (0.2 mL/kg/hr); Drains:80; Blood:1]  Weight: 73.7 kg "     Relevant Results  Scheduled medications  amLODIPine, 10 mg, oral, Daily  [Held by provider] aspirin, 81 mg, oral, Daily  [Held by provider] enoxaparin, 40 mg, subcutaneous, q24h  fluticasone, 2 spray, Each Nostril, Daily  folic acid, 1 mg, intravenous, Daily  gabapentin, 600 mg, oral, TID  ipratropium-albuteroL, 3 mL, nebulization, 4x daily  magnesium sulfate, 2 g, intravenous, Once  [Held by provider] meloxicam, 15 mg, oral, Daily  methylPREDNISolone sodium succinate (PF), 40 mg, intravenous, Daily  multivitamin with minerals, 1 tablet, oral, Daily  nicotine, 1 patch, transdermal, Daily  pantoprazole, 40 mg, intravenous, Daily  piperacillin-tazobactam, 4.5 g, intravenous, q6h  [START ON 11/4/2024] thiamine, 100 mg, oral, Daily  thiamine, 100 mg, intravenous, Daily      Continuous medications  sodium chloride 0.9%, 100 mL/hr, Last Rate: 100 mL/hr (11/01/24 1058)      PRN medications  PRN medications: acetaminophen, albuterol, [Held by provider] albuterol, baclofen, HYDROmorphone, HYDROmorphone, LORazepam **OR** LORazepam **OR** LORazepam, oxygen, polyethylene glycol    Results for orders placed or performed during the hospital encounter of 11/01/24 (from the past 24 hours)   Osmolality   Result Value Ref Range    Osmolality, Serum 292 280 - 300 mOsm/kg   ECG 12 lead   Result Value Ref Range    Ventricular Rate 97 BPM    Atrial Rate 97 BPM    IL Interval 140 ms    QRS Duration 74 ms    QT Interval 340 ms    QTC Calculation(Bazett) 431 ms    P Axis 70 degrees    R Axis 31 degrees    T Axis 61 degrees    QRS Count 16 beats    Q Onset 220 ms    P Onset 150 ms    P Offset 194 ms    T Offset 390 ms    QTC Fredericia 398 ms   Renal function panel   Result Value Ref Range    Glucose 75 74 - 99 mg/dL    Sodium 126 (L) 136 - 145 mmol/L    Potassium 4.7 3.5 - 5.3 mmol/L    Chloride 95 (L) 98 - 107 mmol/L    Bicarbonate 18 (L) 21 - 32 mmol/L    Anion Gap 18 10 - 20 mmol/L    Urea Nitrogen 13 6 - 23 mg/dL    Creatinine 1.19  0.50 - 1.30 mg/dL    eGFR 69 >60 mL/min/1.73m*2    Calcium 7.9 (L) 8.6 - 10.3 mg/dL    Phosphorus 4.2 2.5 - 4.9 mg/dL    Albumin 3.5 3.4 - 5.0 g/dL   Lactate   Result Value Ref Range    Lactate 2.0 0.4 - 2.0 mmol/L   Type And Screen   Result Value Ref Range    ABO TYPE A     Rh TYPE POS     ANTIBODY SCREEN NEG    Urinalysis with Reflex Culture and Microscopic   Result Value Ref Range    Color, Urine Colorless (N) Light-Yellow, Yellow, Dark-Yellow    Appearance, Urine Clear Clear    Specific Gravity, Urine 1.009 1.005 - 1.035    pH, Urine 5.5 5.0, 5.5, 6.0, 6.5, 7.0, 7.5, 8.0    Protein, Urine NEGATIVE NEGATIVE, 10 (TRACE), 20 (TRACE) mg/dL    Glucose, Urine Normal Normal mg/dL    Blood, Urine 0.03 (TRACE) (A) NEGATIVE    Ketones, Urine NEGATIVE NEGATIVE mg/dL    Bilirubin, Urine NEGATIVE NEGATIVE    Urobilinogen, Urine Normal Normal mg/dL    Nitrite, Urine NEGATIVE NEGATIVE    Leukocyte Esterase, Urine NEGATIVE NEGATIVE   Urinalysis Microscopic   Result Value Ref Range    WBC, Urine 1-5 1-5, NONE /HPF    RBC, Urine NONE NONE, 1-2, 3-5 /HPF    Bacteria, Urine 1+ (A) NONE SEEN /HPF   CBC   Result Value Ref Range    WBC 10.5 4.4 - 11.3 x10*3/uL    nRBC 0.0 0.0 - 0.0 /100 WBCs    RBC 3.98 (L) 4.50 - 5.90 x10*6/uL    Hemoglobin 13.5 13.5 - 17.5 g/dL    Hematocrit 38.9 (L) 41.0 - 52.0 %    MCV 98 80 - 100 fL    MCH 33.9 26.0 - 34.0 pg    MCHC 34.7 32.0 - 36.0 g/dL    RDW 13.0 11.5 - 14.5 %    Platelets 389 150 - 450 x10*3/uL   Renal function panel   Result Value Ref Range    Glucose 114 (H) 74 - 99 mg/dL    Sodium 128 (L) 136 - 145 mmol/L    Potassium 4.3 3.5 - 5.3 mmol/L    Chloride 96 (L) 98 - 107 mmol/L    Bicarbonate 22 21 - 32 mmol/L    Anion Gap 14 10 - 20 mmol/L    Urea Nitrogen 13 6 - 23 mg/dL    Creatinine 1.15 0.50 - 1.30 mg/dL    eGFR 72 >60 mL/min/1.73m*2    Calcium 7.7 (L) 8.6 - 10.3 mg/dL    Phosphorus 2.9 2.5 - 4.9 mg/dL    Albumin 3.0 (L) 3.4 - 5.0 g/dL   Magnesium   Result Value Ref Range    Magnesium  1.57 (L) 1.60 - 2.40 mg/dL   Influenza A, and B PCR   Result Value Ref Range    Flu A Result Not Detected Not Detected    Flu B Result Not Detected Not Detected   Sars-CoV-2 PCR   Result Value Ref Range    Coronavirus 2019, PCR Not Detected Not Detected       ECG 12 lead    Result Date: 11/1/2024  Poor data quality, interpretation may be adversely affected Normal sinus rhythm Possible Left atrial enlargement Cannot rule out Anterior infarct , age undetermined Abnormal ECG No previous ECGs available    ECG 12 lead    Result Date: 11/1/2024  Normal sinus rhythm Poor R-wave progression ; consider septal infarct, lead placement, or normal variant Abnormal ECG When compared with ECG of 31-OCT-2024 17:01, (unconfirmed) No significant change was found    XR chest 1 view    Result Date: 11/1/2024  Interpreted By:  Stanley Harris, STUDY: XR CHEST 1 VIEW;  11/1/2024 1:46 am   INDICATION: Signs/Symptoms:Ng tube.     COMPARISON: 10/31/2024   ACCESSION NUMBER(S): [id number]   ORDERING CLINICIAN: DIAMOND MIX   FINDINGS: - NG/OG tube termination: Gastric fundus   Low lung volumes results in basilar and perihilar bronchovascular crowding; however, there is no evidence of consolidation or effusion. There is bibasilar atelectasis.   No pneumothorax.   Gaseous distension of the colon with colonic interposition. There is scattered pneumoperitoneum most pronounced in the right upper quadrant.       NG/OG tube terminates over the gastric fundus.   Similar appearance of pneumoperitoneum in the right upper quadrant.   MACRO: None.   Signed by: Stanley Harris 11/1/2024 1:50 AM Dictation workstation:   [id number]    XR chest 1 view    Result Date: 10/31/2024  Interpreted By:  Nuno Guillory, STUDY: XR CHEST 1 VIEW;; 10/31/2024 7:57 pm   INDICATION: Signs/Symptoms:sob.   COMPARISON: 11/28/2014 and CT abdomen from 10/31/2024   ACCESSION NUMBER(S): [id number]   ORDERING CLINICIAN: MATTHIAS EARL   FINDINGS: Right subdiaphragmatic  air lucency similar to previous CT scan of the abdomen, increased in size since prior suggestive of free intraperitoneal air. Mild left basilar opacity suggestive atelectasis/scarring or infiltrate. The remainder lung fields are clear bilaterally. Tortuous and possibly ectatic aorta with wall calcification. Aneurysm or dissection can not be excluded in the basis of this exam only.       1. Mild left basilar atelectasis/scarring or infiltrate. Recommend clinical correlation and follow-up with PA and lateral chest x-rays to document resolution. 2. Tortuous a possibly ectatic aorta with wall calcification but not significantly changed since previous exam accounting for difference in technique of imaging. If there is clinical concern for acute aortic pathology or pulmonary embolic disease, further evaluation with chest CT is recommended for better assessment. 3. Lucency within the right subdiaphragmatic space is abnormal and may reflect increased in size of intraperitoneal air such as related to previous surgery or bowel perforation. Recommend clinical correlation and follow-up.     SUPPLEMENTAL INFORMATION: Notifi message was left for MATTHIAS EARL regarding this exam by Dr. Guillory on 10/31/2024 at approximately 20:29 hours.     Critical Finding:  See findings. Notification was initiated on 10/31/2024 at 8:28 pm by  Nuno Guillory.  (**-OCF-**) Instructions:   Signed by: Nuno Guillory 10/31/2024 8:29 PM Dictation workstation:   [id number]    CT abdomen pelvis w IV contrast    Result Date: 10/31/2024  Interpreted By:  Alec Llamas, STUDY: CT ABDOMEN PELVIS W IV CONTRAST;  10/31/2024 6:52 pm   INDICATION: Signs/Symptoms:abdominal pain.   COMPARISON: None.   ACCESSION NUMBER(S): [id number]   ORDERING CLINICIAN: AMTTHIAS EARL   TECHNIQUE: Contiguous axial images of the abdomen and pelvis were obtained after the intravenous administration of  contrast. Coronal and sagittal reformatted images were obtained from the axial  images.   FINDINGS: There is limited evaluation of the lung bases. Bibasilar subsegmental atelectasis.   No evidence of liver mass. The gallbladder is present. No significant dilatation common bile duct.   The pancreas, spleen, and adrenal glands appear unremarkable.   Symmetric enhancement of the kidneys. A subcentimeter hypodensity in the upper pole the left kidney is too small to characterize. No hydronephrosis.   Atherosclerotic calcification of the aorta and abdominopelvic vasculature. There is 2.4 cm area of mild ectasia of the infrarenal abdominal aorta.   There is wall thickening and irregularity of the stomach. There is 4 cm x 1.4 cm irregular heterogeneous area of hypoattenuation in the wall of the greater curvature of the stomach on coronal image 35 of 119 concerning for underlying gastric ulcer. There is evidence of free abdominal air compatible with viscus perforation. There is also small amount of free fluid in the abdomen and pelvis. There is peripheral air in segment of the proximal transverse colon in the right abdomen.   Urinary bladder is underdistended and urinary bladder wall thickening is not excluded.   No acute fracture of the lumbar spine.       Wall thickening and irregularity of the stomach. There is 4 cm x 1.4 cm irregular heterogeneous area of hypoattenuation in the wall of the greater curvature of the stomach on coronal image 35 of 119 concerning for underlying gastric ulcer. There is evidence of free abdominal air compatible with viscus perforation and the findings are concerning for perforated gastric ulcer. Other source of perforation of the bowel is also not excluded. Heterogeneous hyperdensity in the stomach lumen may correspond to hemorrhage. Small amount of free fluid in the abdomen and pelvis likely relating to the perforation.   Foci of peripheral air in segment of the proximal transverse colon the right abdomen and pneumatosis ischemia is not excluded.   Underdistention versus  urinary bladder wall thickening; please correlate urinalysis to exclude cystitis.   MACRO: Alec Llamas discussed the significance and urgency of this critical finding by telephone with the emergency room physician on 10/31/2024 at 7:55 pm.  (**-RCF-**) Findings:  See findings.   Signed by: Alec Llamas 10/31/2024 7:55 PM Dictation workstation:   [id number]        This patient has a urinary catheter   Reason for the urinary catheter remaining today? perioperative use for selected surgical procedures      Assessment/Plan   Taqueria Montano is a 64 y.o. male with PMH of COPD, alcohol use disorder, tobacco use disorder, HTN, spinal stenosis of cervical region, neuropathy, who was transferred from [place] to [facility] for management of perforated gastric ulcer. He underwent exploratory laparoscopy with Sukhdev patch early this morning and tolerated it well. He is afebrile. He was occasionally hypotensive early this morning.     Acute Problem Plan  #Perforated gastric ulcer:   #Sepsis  -Likely 2/2 perforated gastric ulcer seen on CT abdomen/pelvis  -Continue Zosyn 4.5 g IV Q6  -Continue protonix 40 mg IV   -NG in place with suction   -F/U pending blood and urine cultures   -Scheduled tylenol 650mg     #Hyponatremia  -Likely 2/2 to beer potomania given alcohol use hx, may have chronic component   -F/U pending urine/serum osmols and urine electrolytes     #Acute hypoxic respiratory failure:   -Possibly 2/2 to aspiration vs. COPD exacerbation vs pain/abdominal distention, likely multifactorial  -Continue Duo-nebs Q6 and PRN  -Continue Solu-medrol 40mg IV  -Encourage incentive spirometry    #Tortuous aorta  -Incidental finding on initial CXR  -F/U with outpatient provider         Resolved Problem Plan  #Elevated lactate  -Normalized to 2.0 overnight; no reason to continue to trend         Chronic Problem Plan  #Alcohol use disorder   -CIWA protocol initiated, thiamine, folate    #Nicotine use disorder   - Nicotine patch      #Spinal stenosis, DDD cervical region   -Continue home gabapentin 600mg TID  -HELD home meloxicam 15mg    #HTN   -Continue home amlodipine 10mg    #Preventative   -HELD ASA 81mg daily      Fluid: Replete PRN  Electrolytes: Replete PRN  Nutrition: NPO  GI ppx: Protonix  DVT/PE ppx: Holding d/t surgery  Abx: Zosyn   IV Lines: PIV  O2: RA     Dispo: Admitted to stepdown unit for management of perforated gastric ulcer and observation post-surgery. ELOS 1-2 days.     Patient seen and discussed with Dr. Zulma Moore, PGY-1; Dr. Ronnell Alexander, PGY-3; and Dr. João Dubois, attending hospitalist.     OCTAVIO YANCEY  Medical Student

## 2024-11-01 NOTE — CARE PLAN
The patient's goals for the shift include  pain management.     The clinical goals for the shift include pain will be controlled <6    Over the shift, the patient did not make progress toward the following goals. Barriers to progression include bowel surgery. Recommendations to address these barriers include surgery/medications.

## 2024-11-01 NOTE — PROGRESS NOTES
Internal Medicine - Daily Progress Note   Hospital Day: 1       Name:Taqueria Montano, AGE: 64 y.o., GENDER: male, MRN: [id number], ROOM: 239/239-A   CODE STATUS: Full Code  Attending Physician: João Dubois DO  Resident: Zulma Moore MD        Chief Complaint   Perforated gastric ulcer       Subjective    Taqueria Montano is a 64 y.o. year old male patient on Hospital Day: 1    Overnight events: Overnight, patient went to the OR - anibal patch of perforated ulcer was performed and drain was placed.    Patient seen and examined at bedside this morning after procedure. States that he was feeling a lot better. Also notes that his breathing feels improved and thinks that his SOB was largely related to his abdominal discomfort and distension. States that has has had some wheezing recently, but that is not abnormal for him. He also notes a mild dry cough. He has some post-operative abdominal pain, but otherwise has no acute complaints. He denies chest pain, SOB, lower extremity swelling.      Meds    Scheduled medications  amLODIPine, 10 mg, oral, Daily  [Held by provider] aspirin, 81 mg, oral, Daily  [Held by provider] enoxaparin, 40 mg, subcutaneous, q24h  fluticasone, 2 spray, Each Nostril, Daily  folic acid, 1 mg, intravenous, Daily  gabapentin, 600 mg, oral, TID  ipratropium-albuteroL, 3 mL, nebulization, 4x daily  magnesium sulfate, 2 g, intravenous, Once  [Held by provider] meloxicam, 15 mg, oral, Daily  methylPREDNISolone sodium succinate (PF), 40 mg, intravenous, Daily  multivitamin with minerals, 1 tablet, oral, Daily  nicotine, 1 patch, transdermal, Daily  pantoprazole, 40 mg, intravenous, Daily  piperacillin-tazobactam, 4.5 g, intravenous, q6h  [START ON 11/4/2024] thiamine, 100 mg, oral, Daily  thiamine, 100 mg, intravenous, Daily      Continuous medications  sodium chloride 0.9%, 100 mL/hr, Last Rate: 100 mL/hr (11/01/24 1058)      PRN medications  PRN medications: acetaminophen, albuterol, [Held by  "provider] albuterol, baclofen, HYDROmorphone, HYDROmorphone, LORazepam **OR** LORazepam **OR** LORazepam, oxygen, polyethylene glycol     Objective    Physical Exam  Constitutional:       General: He is not in acute distress.  HENT:      Head: Normocephalic and atraumatic.      Nose:      Comments: NG tube in place     Mouth/Throat:      Mouth: Mucous membranes are dry.      Pharynx: Oropharynx is clear.   Cardiovascular:      Rate and Rhythm: Normal rate and regular rhythm.      Heart sounds: Murmur heard.   Pulmonary:      Effort: Pulmonary effort is normal. No respiratory distress.      Breath sounds: Normal breath sounds. No stridor. No wheezing or rales.   Abdominal:      General: There is no distension.      Palpations: Abdomen is soft.      Tenderness: There is abdominal tenderness. There is no guarding.      Comments: Patient with closed laparoscopic incisions that are clean and dry  Has R-sided abdominal drain in place draining red fluid   Musculoskeletal:      Right lower leg: No edema.      Left lower leg: No edema.   Skin:     General: Skin is warm and dry.   Neurological:      General: No focal deficit present.      Mental Status: He is alert.   Psychiatric:         Mood and Affect: Mood normal.         Behavior: Behavior normal.        Visit Vitals  /64 (BP Location: Left arm, Patient Position: Lying)   Pulse 84   Temp 36.4 °C (97.5 °F) (Temporal)   Resp 16   Ht 1.753 m (5' 9.02\")   Wt 73.7 kg (162 lb 7.7 oz)   SpO2 94%   BMI 23.98 kg/m²   Smoking Status Every Day   BSA 1.89 m²        Intake/Output Summary (Last 24 hours) at 11/1/2024 1155  Last data filed at 11/1/2024 1058  Gross per 24 hour   Intake 2650 ml   Output 1031 ml   Net 1619 ml       Labs:   Results from last 72 hours   Lab Units 11/01/24  0720 11/01/24  0212 10/31/24  1755   SODIUM mmol/L 128* 126* 123*   POTASSIUM mmol/L 4.3 4.7 3.9   CHLORIDE mmol/L 96* 95* 90*   CO2 mmol/L 22 18* 24   BUN mg/dL 13 13 9   CREATININE mg/dL 1.15 1.19 " "0.87   GLUCOSE mg/dL 114* 75 79   CALCIUM mg/dL 7.7* 7.9* 9.3   ANION GAP mmol/L 14 18 13   EGFR mL/min/1.73m*2 72 69 >90   PHOSPHORUS mg/dL 2.9 4.2  --       Results from last 72 hours   Lab Units 11/01/24  0720 10/31/24  1755   WBC AUTO x10*3/uL 10.5 9.9   HEMOGLOBIN g/dL 13.5 16.6   HEMATOCRIT % 38.9* 46.0   PLATELETS AUTO x10*3/uL 389 479*   NEUTROS PCT AUTO %  --  76.1   LYMPHS PCT AUTO %  --  16.3   MONOS PCT AUTO %  --  5.2   EOS PCT AUTO %  --  1.4      Lab Results   Component Value Date    CALCIUM 7.7 (L) 11/01/2024    PHOS 2.9 11/01/2024      No results found for: \"CRP\"    [unfilled]     Micro/ID:   No results found for the last 90 days.                   No lab exists for component: \"AGALPCRNB\"     Lab Results   Component Value Date    BLOODCULT Loaded on Instrument - Culture in progress 10/31/2024       Images    ECG 12 lead    Result Date: 11/1/2024   Poor data quality, interpretation may be adversely affected Normal sinus rhythm Possible Left atrial enlargement Cannot rule out Anterior infarct , age undetermined Abnormal ECG No previous ECGs available    ECG 12 lead    Result Date: 11/1/2024  Normal sinus rhythm Poor R-wave progression ; consider septal infarct, lead placement, or normal variant Abnormal ECG When compared with ECG of 31-OCT-2024 17:01, (unconfirmed) No significant change was found    XR chest 1 view    Result Date: 11/1/2024  Interpreted By:  Stanley Harris, STUDY: XR CHEST 1 VIEW;  11/1/2024 1:46 am   INDICATION: Signs/Symptoms:Ng tube.     COMPARISON: 10/31/2024   ACCESSION NUMBER(S): [id number]   ORDERING CLINICIAN: DIAMOND MIX   FINDINGS: - NG/OG tube termination: Gastric fundus   Low lung volumes results in basilar and perihilar bronchovascular crowding; however, there is no evidence of consolidation or effusion. There is bibasilar atelectasis.   No pneumothorax.   Gaseous distension of the colon with colonic interposition. There is scattered pneumoperitoneum most " pronounced in the right upper quadrant.       NG/OG tube terminates over the gastric fundus.   Similar appearance of pneumoperitoneum in the right upper quadrant.   MACRO: None.   Signed by: Stanley Harris 11/1/2024 1:50 AM Dictation workstation:   [id number]    XR chest 1 view    Result Date: 10/31/2024  Interpreted By:  Nuno Guillory, STUDY: XR CHEST 1 VIEW;; 10/31/2024 7:57 pm   INDICATION: Signs/Symptoms:sob.   COMPARISON: 11/28/2014 and CT abdomen from 10/31/2024   ACCESSION NUMBER(S): [id number]   ORDERING CLINICIAN: MATTHIAS EARL   FINDINGS: Right subdiaphragmatic air lucency similar to previous CT scan of the abdomen, increased in size since prior suggestive of free intraperitoneal air. Mild left basilar opacity suggestive atelectasis/scarring or infiltrate. The remainder lung fields are clear bilaterally. Tortuous and possibly ectatic aorta with wall calcification. Aneurysm or dissection can not be excluded in the basis of this exam only.       1. Mild left basilar atelectasis/scarring or infiltrate. Recommend clinical correlation and follow-up with PA and lateral chest x-rays to document resolution. 2. Tortuous a possibly ectatic aorta with wall calcification but not significantly changed since previous exam accounting for difference in technique of imaging. If there is clinical concern for acute aortic pathology or pulmonary embolic disease, further evaluation with chest CT is recommended for better assessment. 3. Lucency within the right subdiaphragmatic space is abnormal and may reflect increased in size of intraperitoneal air such as related to previous surgery or bowel perforation. Recommend clinical correlation and follow-up.     SUPPLEMENTAL INFORMATION: Notifi message was left for MATTHIAS EARL regarding this exam by Dr. Guillory on 10/31/2024 at approximately 20:29 hours.     Critical Finding:  See findings. Notification was initiated on 10/31/2024 at 8:28 pm by  Nuno Guillory.  (**-OCF-**)  Instructions:   Signed by: Nuno Guillory 10/31/2024 8:29 PM Dictation workstation:   [id number]    CT abdomen pelvis w IV contrast    Result Date: 10/31/2024  Interpreted By:  Alec Llamas, STUDY: CT ABDOMEN PELVIS W IV CONTRAST;  10/31/2024 6:52 pm   INDICATION: Signs/Symptoms:abdominal pain.   COMPARISON: None.   ACCESSION NUMBER(S): [id number]   ORDERING CLINICIAN: MATTHIAS EARL   TECHNIQUE: Contiguous axial images of the abdomen and pelvis were obtained after the intravenous administration of  contrast. Coronal and sagittal reformatted images were obtained from the axial images.   FINDINGS: There is limited evaluation of the lung bases. Bibasilar subsegmental atelectasis.   No evidence of liver mass. The gallbladder is present. No significant dilatation common bile duct.   The pancreas, spleen, and adrenal glands appear unremarkable.   Symmetric enhancement of the kidneys. A subcentimeter hypodensity in the upper pole the left kidney is too small to characterize. No hydronephrosis.   Atherosclerotic calcification of the aorta and abdominopelvic vasculature. There is 2.4 cm area of mild ectasia of the infrarenal abdominal aorta.   There is wall thickening and irregularity of the stomach. There is 4 cm x 1.4 cm irregular heterogeneous area of hypoattenuation in the wall of the greater curvature of the stomach on coronal image 35 of 119 concerning for underlying gastric ulcer. There is evidence of free abdominal air compatible with viscus perforation. There is also small amount of free fluid in the abdomen and pelvis. There is peripheral air in segment of the proximal transverse colon in the right abdomen.   Urinary bladder is underdistended and urinary bladder wall thickening is not excluded.   No acute fracture of the lumbar spine.       Wall thickening and irregularity of the stomach. There is 4 cm x 1.4 cm irregular heterogeneous area of hypoattenuation in the wall of the greater curvature of the stomach  on coronal image 35 of 119 concerning for underlying gastric ulcer. There is evidence of free abdominal air compatible with viscus perforation and the findings are concerning for perforated gastric ulcer. Other source of perforation of the bowel is also not excluded. Heterogeneous hyperdensity in the stomach lumen may correspond to hemorrhage. Small amount of free fluid in the abdomen and pelvis likely relating to the perforation.   Foci of peripheral air in segment of the proximal transverse colon the right abdomen and pneumatosis ischemia is not excluded.   Underdistention versus urinary bladder wall thickening; please correlate urinalysis to exclude cystitis.   MACRO: Alec Llamas discussed the significance and urgency of this critical finding by telephone with the emergency room physician on 10/31/2024 at 7:55 pm.  (**-RCF-**) Findings:  See findings.   Signed by: Alec Llamas 10/31/2024 7:55 PM Dictation workstation:   [id number]      Assessment and Plan    Taqueria Montano is a 64 y.o. male admitted on 11/1/2024       ACUTE MEDICAL ISSUES:  #Perforated gastric ulcer  #Sepsis, resolved  #Elevated lactate, resolved  - Perforated gastric ulcer seen on CT abdomen/pelvis  - Underwent anibal patch repair of perforated gastric ulcer 11/1 - R-side abdominal drain placed  - Surgery following, appreciate recs  - NG tube in place w/ suction  - Patient to remain NPO, pending surgery recs  - Zosyn 4.5g q6  - Protonix 40mg daily   -Follow-up blood cultures  - Telemetry monitoring   - Will discontinue home meloxicam at discharge  - Pain regiment: Tylenol 650 mg q6 hrs PRN mild pain, Dilaudid 0.2/0.4 mg q4 hrs PRN moderate/severe pain    #Hyponatremia, improving  - Suspect 2/2 to beer potomania given hx  - Urine and serum osmols, urine electrolytes ordered to further evaluate etiology     #Acute hypoxic respiratory failure:   - Difficult to assess whether 2/2 to COPD exacerbation vs pain/abdominal distention vs. CAP  - 4L NC  - wean as tolerated  - Duo-nebs q6 scheduled + q2 PRN  - Solu-medrol 40mg IV  - Encourage incentive spirometry  - If this were 2/2 CAP - patient is on antibiotics that cover  - Add urine legionella and urine strep pneumo antigens  - Add sputum culture      ADDRESSED/ RESOLVED MEDICAL ISSUES:  #Tortuous aorta  - Incidental finding on initial CXR - appears stable from previous imaginh  - Can consider CT chest for further workup as outpatient    CHRONIC MEDICAL ISSUES:  #Alcohol use disorder - CIWA protocol initiated, thiamine, folate  #Nicotine use disorder - Nicotine patch   #Spinal stenosis, DDD cervical region - continue home gabapentin 600mg TID, baclofen 10 mg TID PRN, meloxicam 15mg (holding) will discontinue this at discharge  #HTN - amlodipine 10mg  #Preventative - ASA 81mg daily (held)      Fluids: NS 100cc/hr  Electrolytes: Replete PRN  Nutrition: NPO  Antimicrobials: Zosyn  DVT ppx: SCDs - chemical DVT prophylaxis held at this time  GI ppx: Protonix  Catheter: Patterson in place  Lines: PIV  Supplemental Oxygen: 4L NC  Emergency Contact: Extended Emergency Contact Information  Primary Emergency Contact: Paige Sharma  Mobile Phone: [phone]  Relation: Spouse  Preferred language: English   needed? No   Code: Full Code     Disposition: 64 year old male admitted for management of perforated gastric ulcer s/p anibal patch. Pending surgery recommendations for NG tube removal and advancement of diet. Pending improvement of respiratory status.     Zulma Moore MD  Internal Medicine, PGY- 1  11/01/24 at 11:56 AM

## 2024-11-02 LAB
ALBUMIN SERPL BCP-MCNC: 2.3 G/DL (ref 3.4–5)
ALBUMIN SERPL BCP-MCNC: 2.7 G/DL (ref 3.4–5)
ALP SERPL-CCNC: 41 U/L (ref 33–136)
ALT SERPL W P-5'-P-CCNC: 33 U/L (ref 10–52)
ANION GAP SERPL CALC-SCNC: 10 MMOL/L (ref 10–20)
AST SERPL W P-5'-P-CCNC: 41 U/L (ref 9–39)
BILIRUB DIRECT SERPL-MCNC: 0.1 MG/DL (ref 0–0.3)
BILIRUB SERPL-MCNC: 0.4 MG/DL (ref 0–1.2)
BUN SERPL-MCNC: 13 MG/DL (ref 6–23)
CALCIUM SERPL-MCNC: 7 MG/DL (ref 8.6–10.3)
CHLORIDE SERPL-SCNC: 103 MMOL/L (ref 98–107)
CHLORIDE UR-SCNC: 36 MMOL/L
CHLORIDE/CREATININE (MMOL/G) IN URINE: 117 MMOL/G CREAT (ref 23–275)
CO2 SERPL-SCNC: 23 MMOL/L (ref 21–32)
CREAT SERPL-MCNC: 0.73 MG/DL (ref 0.5–1.3)
CREAT UR-MCNC: 30.8 MG/DL (ref 20–370)
EGFRCR SERPLBLD CKD-EPI 2021: >90 ML/MIN/1.73M*2
ERYTHROCYTE [DISTWIDTH] IN BLOOD BY AUTOMATED COUNT: 13.3 % (ref 11.5–14.5)
GLUCOSE SERPL-MCNC: 115 MG/DL (ref 74–99)
HCT VFR BLD AUTO: 30.8 % (ref 41–52)
HGB BLD-MCNC: 10.8 G/DL (ref 13.5–17.5)
INR PPP: 1.3 (ref 0.9–1.1)
MAGNESIUM SERPL-MCNC: 1.89 MG/DL (ref 1.6–2.4)
MCH RBC QN AUTO: 33.9 PG (ref 26–34)
MCHC RBC AUTO-ENTMCNC: 35.1 G/DL (ref 32–36)
MCV RBC AUTO: 97 FL (ref 80–100)
NRBC BLD-RTO: 0 /100 WBCS (ref 0–0)
OSMOLALITY UR: 200 MOSM/KG (ref 200–1200)
OSMOLALITY UR: 298 MOSM/KG (ref 200–1200)
PHOSPHATE SERPL-MCNC: 2.2 MG/DL (ref 2.5–4.9)
PLATELET # BLD AUTO: 332 X10*3/UL (ref 150–450)
POTASSIUM SERPL-SCNC: 3.7 MMOL/L (ref 3.5–5.3)
POTASSIUM UR-SCNC: 16 MMOL/L
POTASSIUM/CREAT UR-RTO: 52 MMOL/G CREAT
PROT SERPL-MCNC: 5.8 G/DL (ref 6.4–8.2)
PROTHROMBIN TIME: 14.5 SECONDS (ref 9.8–12.8)
RBC # BLD AUTO: 3.19 X10*6/UL (ref 4.5–5.9)
SODIUM SERPL-SCNC: 132 MMOL/L (ref 136–145)
SODIUM UR-SCNC: 32 MMOL/L
SODIUM/CREAT UR-RTO: 104 MMOL/G CREAT
WBC # BLD AUTO: 16.3 X10*3/UL (ref 4.4–11.3)

## 2024-11-02 PROCEDURE — 82248 BILIRUBIN DIRECT: CPT | Performed by: SURGERY

## 2024-11-02 PROCEDURE — 2500000002 HC RX 250 W HCPCS SELF ADMINISTERED DRUGS (ALT 637 FOR MEDICARE OP, ALT 636 FOR OP/ED)

## 2024-11-02 PROCEDURE — 84100 ASSAY OF PHOSPHORUS: CPT

## 2024-11-02 PROCEDURE — S4991 NICOTINE PATCH NONLEGEND: HCPCS

## 2024-11-02 PROCEDURE — 2500000001 HC RX 250 WO HCPCS SELF ADMINISTERED DRUGS (ALT 637 FOR MEDICARE OP)

## 2024-11-02 PROCEDURE — 82040 ASSAY OF SERUM ALBUMIN: CPT | Performed by: SURGERY

## 2024-11-02 PROCEDURE — 0DQ64ZZ REPAIR STOMACH, PERCUTANEOUS ENDOSCOPIC APPROACH: ICD-10-PCS | Performed by: SURGERY

## 2024-11-02 PROCEDURE — 94760 N-INVAS EAR/PLS OXIMETRY 1: CPT

## 2024-11-02 PROCEDURE — 80069 RENAL FUNCTION PANEL: CPT

## 2024-11-02 PROCEDURE — 94640 AIRWAY INHALATION TREATMENT: CPT

## 2024-11-02 PROCEDURE — 2500000005 HC RX 250 GENERAL PHARMACY W/O HCPCS: Performed by: INTERNAL MEDICINE

## 2024-11-02 PROCEDURE — 2500000004 HC RX 250 GENERAL PHARMACY W/ HCPCS (ALT 636 FOR OP/ED): Performed by: SURGERY

## 2024-11-02 PROCEDURE — 2500000004 HC RX 250 GENERAL PHARMACY W/ HCPCS (ALT 636 FOR OP/ED): Performed by: INTERNAL MEDICINE

## 2024-11-02 PROCEDURE — 2500000004 HC RX 250 GENERAL PHARMACY W/ HCPCS (ALT 636 FOR OP/ED)

## 2024-11-02 PROCEDURE — 36415 COLL VENOUS BLD VENIPUNCTURE: CPT | Performed by: SURGERY

## 2024-11-02 PROCEDURE — 85027 COMPLETE CBC AUTOMATED: CPT

## 2024-11-02 PROCEDURE — 2060000001 HC INTERMEDIATE ICU ROOM DAILY

## 2024-11-02 PROCEDURE — 2500000002 HC RX 250 W HCPCS SELF ADMINISTERED DRUGS (ALT 637 FOR MEDICARE OP, ALT 636 FOR OP/ED): Performed by: SURGERY

## 2024-11-02 PROCEDURE — 83735 ASSAY OF MAGNESIUM: CPT

## 2024-11-02 PROCEDURE — 85610 PROTHROMBIN TIME: CPT | Performed by: SURGERY

## 2024-11-02 PROCEDURE — 2500000001 HC RX 250 WO HCPCS SELF ADMINISTERED DRUGS (ALT 637 FOR MEDICARE OP): Performed by: SURGERY

## 2024-11-02 PROCEDURE — 99232 SBSQ HOSP IP/OBS MODERATE 35: CPT

## 2024-11-02 PROCEDURE — 80053 COMPREHEN METABOLIC PANEL: CPT

## 2024-11-02 PROCEDURE — 36415 COLL VENOUS BLD VENIPUNCTURE: CPT

## 2024-11-02 RX ORDER — GABAPENTIN 250 MG/5ML
600 SOLUTION ORAL EVERY 8 HOURS SCHEDULED
Status: DISPENSED | OUTPATIENT
Start: 2024-11-02

## 2024-11-02 RX ORDER — PANTOPRAZOLE SODIUM 40 MG/10ML
40 INJECTION, POWDER, LYOPHILIZED, FOR SOLUTION INTRAVENOUS 2 TIMES DAILY
Status: DISPENSED | OUTPATIENT
Start: 2024-11-02

## 2024-11-02 RX ORDER — ACETAMINOPHEN 10 MG/ML
1000 INJECTION, SOLUTION INTRAVENOUS EVERY 6 HOURS SCHEDULED
Status: DISPENSED | OUTPATIENT
Start: 2024-11-02 | End: 2024-11-06

## 2024-11-02 RX ORDER — POLYETHYLENE GLYCOL 3350 17 G/17G
17 POWDER, FOR SOLUTION ORAL DAILY
Status: DISPENSED | OUTPATIENT
Start: 2024-11-02

## 2024-11-02 RX ORDER — CLARITHROMYCIN 250 MG/5ML
500 FOR SUSPENSION ORAL EVERY 12 HOURS SCHEDULED
Status: DISPENSED | OUTPATIENT
Start: 2024-11-02

## 2024-11-02 RX ORDER — SUCRALFATE 1 G/10ML
1 SUSPENSION ORAL EVERY 6 HOURS SCHEDULED
Status: DISPENSED | OUTPATIENT
Start: 2024-11-02

## 2024-11-02 RX ORDER — METHOCARBAMOL 100 MG/ML
500 INJECTION, SOLUTION INTRAMUSCULAR; INTRAVENOUS EVERY 8 HOURS
Status: DISPENSED | OUTPATIENT
Start: 2024-11-02

## 2024-11-02 RX ORDER — KETOROLAC TROMETHAMINE 15 MG/ML
15 INJECTION, SOLUTION INTRAMUSCULAR; INTRAVENOUS EVERY 6 HOURS SCHEDULED
Status: DISPENSED | OUTPATIENT
Start: 2024-11-02 | End: 2024-11-06

## 2024-11-02 RX ORDER — SODIUM CHLORIDE 9 MG/ML
100 INJECTION, SOLUTION INTRAVENOUS CONTINUOUS
Status: DISCONTINUED | OUTPATIENT
Start: 2024-11-02 | End: 2024-11-03

## 2024-11-02 RX ADMIN — SUCRALFATE 1 G: 1 SUSPENSION ORAL at 23:33

## 2024-11-02 RX ADMIN — ACETAMINOPHEN 1000 MG: 10 INJECTION INTRAVENOUS at 12:18

## 2024-11-02 RX ADMIN — POLYETHYLENE GLYCOL 3350 17 G: 17 POWDER, FOR SOLUTION ORAL at 12:01

## 2024-11-02 RX ADMIN — CLARITHROMYCIN 500 MG: 250 FOR SUSPENSION ORAL at 20:38

## 2024-11-02 RX ADMIN — IPRATROPIUM BROMIDE AND ALBUTEROL SULFATE 3 ML: .5; 3 SOLUTION RESPIRATORY (INHALATION) at 07:59

## 2024-11-02 RX ADMIN — PIPERACILLIN SODIUM AND TAZOBACTAM SODIUM 4.5 G: 4; .5 INJECTION, SOLUTION INTRAVENOUS at 13:17

## 2024-11-02 RX ADMIN — METHOCARBAMOL 500 MG: 100 INJECTION INTRAMUSCULAR; INTRAVENOUS at 18:28

## 2024-11-02 RX ADMIN — METHOCARBAMOL 500 MG: 100 INJECTION INTRAMUSCULAR; INTRAVENOUS at 12:01

## 2024-11-02 RX ADMIN — HYDROMORPHONE HYDROCHLORIDE 0.4 MG: 1 INJECTION, SOLUTION INTRAMUSCULAR; INTRAVENOUS; SUBCUTANEOUS at 20:53

## 2024-11-02 RX ADMIN — SODIUM CHLORIDE 100 ML/HR: 9 INJECTION, SOLUTION INTRAVENOUS at 12:18

## 2024-11-02 RX ADMIN — ACETAMINOPHEN 1000 MG: 10 INJECTION INTRAVENOUS at 17:40

## 2024-11-02 RX ADMIN — PIPERACILLIN SODIUM AND TAZOBACTAM SODIUM 4.5 G: 4; .5 INJECTION, SOLUTION INTRAVENOUS at 20:38

## 2024-11-02 RX ADMIN — PIPERACILLIN SODIUM AND TAZOBACTAM SODIUM 4.5 G: 4; .5 INJECTION, SOLUTION INTRAVENOUS at 08:34

## 2024-11-02 RX ADMIN — AMLODIPINE BESYLATE 10 MG: 5 TABLET ORAL at 08:34

## 2024-11-02 RX ADMIN — POTASSIUM PHOSPHATE, MONOBASIC AND POTASSIUM PHOSPHATE, DIBASIC 15 MMOL: 224; 236 INJECTION, SOLUTION, CONCENTRATE INTRAVENOUS at 17:20

## 2024-11-02 RX ADMIN — PIPERACILLIN SODIUM AND TAZOBACTAM SODIUM 4.5 G: 4; .5 INJECTION, SOLUTION INTRAVENOUS at 01:46

## 2024-11-02 RX ADMIN — GABAPENTIN 600 MG: 250 SOLUTION ORAL at 20:37

## 2024-11-02 RX ADMIN — GABAPENTIN 600 MG: 300 CAPSULE ORAL at 08:34

## 2024-11-02 RX ADMIN — Medication 5 L/MIN: at 19:35

## 2024-11-02 RX ADMIN — PANTOPRAZOLE SODIUM 40 MG: 40 INJECTION, POWDER, FOR SOLUTION INTRAVENOUS at 08:33

## 2024-11-02 RX ADMIN — GABAPENTIN 600 MG: 250 SOLUTION ORAL at 13:17

## 2024-11-02 RX ADMIN — KETOROLAC TROMETHAMINE 15 MG: 15 INJECTION, SOLUTION INTRAMUSCULAR; INTRAVENOUS at 23:33

## 2024-11-02 RX ADMIN — Medication 3 L/MIN: at 07:59

## 2024-11-02 RX ADMIN — ACETAMINOPHEN 1000 MG: 10 INJECTION INTRAVENOUS at 23:33

## 2024-11-02 RX ADMIN — METHYLPREDNISOLONE SODIUM SUCCINATE 40 MG: 40 INJECTION, POWDER, FOR SOLUTION INTRAMUSCULAR; INTRAVENOUS at 08:33

## 2024-11-02 RX ADMIN — KETOROLAC TROMETHAMINE 15 MG: 15 INJECTION, SOLUTION INTRAMUSCULAR; INTRAVENOUS at 17:39

## 2024-11-02 RX ADMIN — SUCRALFATE 1 G: 1 SUSPENSION ORAL at 12:01

## 2024-11-02 RX ADMIN — ALBUTEROL SULFATE 2.5 MG: 2.5 SOLUTION RESPIRATORY (INHALATION) at 05:31

## 2024-11-02 RX ADMIN — Medication 1 TABLET: at 08:34

## 2024-11-02 RX ADMIN — CLARITHROMYCIN 500 MG: 250 FOR SUSPENSION ORAL at 12:03

## 2024-11-02 RX ADMIN — HYDROMORPHONE HYDROCHLORIDE 0.4 MG: 1 INJECTION, SOLUTION INTRAMUSCULAR; INTRAVENOUS; SUBCUTANEOUS at 01:45

## 2024-11-02 RX ADMIN — FOLIC ACID 1 MG: 5 INJECTION, SOLUTION INTRAMUSCULAR; INTRAVENOUS; SUBCUTANEOUS at 09:09

## 2024-11-02 RX ADMIN — NICOTINE 1 PATCH: 21 PATCH, EXTENDED RELEASE TRANSDERMAL at 08:33

## 2024-11-02 RX ADMIN — IPRATROPIUM BROMIDE AND ALBUTEROL SULFATE 3 ML: .5; 3 SOLUTION RESPIRATORY (INHALATION) at 12:00

## 2024-11-02 RX ADMIN — IPRATROPIUM BROMIDE AND ALBUTEROL SULFATE 3 ML: .5; 3 SOLUTION RESPIRATORY (INHALATION) at 14:59

## 2024-11-02 RX ADMIN — THIAMINE HYDROCHLORIDE 100 MG: 100 INJECTION, SOLUTION INTRAMUSCULAR; INTRAVENOUS at 08:33

## 2024-11-02 RX ADMIN — HYDROMORPHONE HYDROCHLORIDE 0.4 MG: 1 INJECTION, SOLUTION INTRAMUSCULAR; INTRAVENOUS; SUBCUTANEOUS at 09:09

## 2024-11-02 RX ADMIN — HYDROMORPHONE HYDROCHLORIDE 0.4 MG: 1 INJECTION, SOLUTION INTRAMUSCULAR; INTRAVENOUS; SUBCUTANEOUS at 04:53

## 2024-11-02 RX ADMIN — FLUTICASONE PROPIONATE 2 SPRAY: 50 SPRAY, METERED NASAL at 08:36

## 2024-11-02 RX ADMIN — KETOROLAC TROMETHAMINE 15 MG: 15 INJECTION, SOLUTION INTRAMUSCULAR; INTRAVENOUS at 12:01

## 2024-11-02 RX ADMIN — PANTOPRAZOLE SODIUM 40 MG: 40 INJECTION, POWDER, FOR SOLUTION INTRAVENOUS at 20:38

## 2024-11-02 RX ADMIN — IPRATROPIUM BROMIDE AND ALBUTEROL SULFATE 3 ML: .5; 3 SOLUTION RESPIRATORY (INHALATION) at 19:35

## 2024-11-02 RX ADMIN — SUCRALFATE 1 G: 1 SUSPENSION ORAL at 17:39

## 2024-11-02 RX ADMIN — HYDROMORPHONE HYDROCHLORIDE 0.4 MG: 1 INJECTION, SOLUTION INTRAMUSCULAR; INTRAVENOUS; SUBCUTANEOUS at 13:18

## 2024-11-02 ASSESSMENT — LIFESTYLE VARIABLES
AUDITORY DISTURBANCES: NOT PRESENT
TOTAL SCORE: 1
AGITATION: NORMAL ACTIVITY
VISUAL DISTURBANCES: NOT PRESENT
ANXIETY: NO ANXIETY, AT EASE
HEADACHE, FULLNESS IN HEAD: NOT PRESENT
VISUAL DISTURBANCES: NOT PRESENT
NAUSEA AND VOMITING: NO NAUSEA AND NO VOMITING
PULSE: 76
ORIENTATION AND CLOUDING OF SENSORIUM: ORIENTED AND CAN DO SERIAL ADDITIONS
HEADACHE, FULLNESS IN HEAD: NOT PRESENT
PAROXYSMAL SWEATS: NO SWEAT VISIBLE
PAROXYSMAL SWEATS: NO SWEAT VISIBLE
NAUSEA AND VOMITING: NO NAUSEA AND NO VOMITING
TOTAL SCORE: 3
AGITATION: SOMEWHAT MORE THAN NORMAL ACTIVITY
AGITATION: SOMEWHAT MORE THAN NORMAL ACTIVITY
BLOOD PRESSURE: 112/57
NAUSEA AND VOMITING: NO NAUSEA AND NO VOMITING
AGITATION: 3
VISUAL DISTURBANCES: NOT PRESENT
AUDITORY DISTURBANCES: NOT PRESENT
AGITATION: SOMEWHAT MORE THAN NORMAL ACTIVITY
TOTAL SCORE: 3
VISUAL DISTURBANCES: NOT PRESENT
NAUSEA AND VOMITING: NO NAUSEA AND NO VOMITING
TREMOR: NO TREMOR
AUDITORY DISTURBANCES: NOT PRESENT
AUDITORY DISTURBANCES: NOT PRESENT
ANXIETY: MILDLY ANXIOUS
PAROXYSMAL SWEATS: NO SWEAT VISIBLE
ANXIETY: NO ANXIETY, AT EASE
PULSE: 82
HEADACHE, FULLNESS IN HEAD: NOT PRESENT
TOTAL SCORE: 1
HEADACHE, FULLNESS IN HEAD: NOT PRESENT
HEADACHE, FULLNESS IN HEAD: NOT PRESENT
VISUAL DISTURBANCES: NOT PRESENT
ORIENTATION AND CLOUDING OF SENSORIUM: ORIENTED AND CAN DO SERIAL ADDITIONS
TOTAL SCORE: 1
BLOOD PRESSURE: 113/65
NAUSEA AND VOMITING: NO NAUSEA AND NO VOMITING
ANXIETY: NO ANXIETY, AT EASE
AGITATION: NORMAL ACTIVITY
ORIENTATION AND CLOUDING OF SENSORIUM: ORIENTED AND CAN DO SERIAL ADDITIONS
NAUSEA AND VOMITING: NO NAUSEA AND NO VOMITING
PAROXYSMAL SWEATS: NO SWEAT VISIBLE
TREMOR: NOT VISIBLE, BUT CAN BE FELT FINGERTIP TO FINGERTIP
TREMOR: NOT VISIBLE, BUT CAN BE FELT FINGERTIP TO FINGERTIP
AGITATION: NORMAL ACTIVITY
NAUSEA AND VOMITING: NO NAUSEA AND NO VOMITING
ORIENTATION AND CLOUDING OF SENSORIUM: ORIENTED AND CAN DO SERIAL ADDITIONS
VISUAL DISTURBANCES: NOT PRESENT
AUDITORY DISTURBANCES: NOT PRESENT
HEADACHE, FULLNESS IN HEAD: NOT PRESENT
TOTAL SCORE: 1
AUDITORY DISTURBANCES: NOT PRESENT
TREMOR: NOT VISIBLE, BUT CAN BE FELT FINGERTIP TO FINGERTIP
ANXIETY: NO ANXIETY, AT EASE
AUDITORY DISTURBANCES: NOT PRESENT
HEADACHE, FULLNESS IN HEAD: VERY MILD
ANXIETY: 2
PAROXYSMAL SWEATS: NO SWEAT VISIBLE
VISUAL DISTURBANCES: NOT PRESENT
PAROXYSMAL SWEATS: NO SWEAT VISIBLE
ORIENTATION AND CLOUDING OF SENSORIUM: ORIENTED AND CAN DO SERIAL ADDITIONS
ORIENTATION AND CLOUDING OF SENSORIUM: ORIENTED AND CAN DO SERIAL ADDITIONS
ANXIETY: NO ANXIETY, AT EASE
TOTAL SCORE: 2
VISUAL DISTURBANCES: NOT PRESENT
PAROXYSMAL SWEATS: NO SWEAT VISIBLE
TREMOR: NO TREMOR
BLOOD PRESSURE: 100/58
PULSE: 83
PULSE: 89
ORIENTATION AND CLOUDING OF SENSORIUM: ORIENTED AND CAN DO SERIAL ADDITIONS
ORIENTATION AND CLOUDING OF SENSORIUM: ORIENTED AND CAN DO SERIAL ADDITIONS
TOTAL SCORE: 3
AUDITORY DISTURBANCES: NOT PRESENT
HEADACHE, FULLNESS IN HEAD: NOT PRESENT
AGITATION: NORMAL ACTIVITY
BLOOD PRESSURE: 133/64
TREMOR: NO TREMOR
ANXIETY: MILDLY ANXIOUS
TREMOR: NO TREMOR
NAUSEA AND VOMITING: NO NAUSEA AND NO VOMITING
TREMOR: NOT VISIBLE, BUT CAN BE FELT FINGERTIP TO FINGERTIP
PAROXYSMAL SWEATS: NO SWEAT VISIBLE

## 2024-11-02 ASSESSMENT — COGNITIVE AND FUNCTIONAL STATUS - GENERAL
HELP NEEDED FOR BATHING: A LITTLE
STANDING UP FROM CHAIR USING ARMS: A LITTLE
MOVING TO AND FROM BED TO CHAIR: A LITTLE
DRESSING REGULAR UPPER BODY CLOTHING: A LITTLE
DRESSING REGULAR LOWER BODY CLOTHING: A LITTLE
TOILETING: A LITTLE
WALKING IN HOSPITAL ROOM: A LITTLE
TURNING FROM BACK TO SIDE WHILE IN FLAT BAD: A LITTLE
DAILY ACTIVITIY SCORE: 20
MOBILITY SCORE: 19
CLIMB 3 TO 5 STEPS WITH RAILING: A LITTLE

## 2024-11-02 ASSESSMENT — PAIN SCALES - GENERAL
PAINLEVEL_OUTOF10: 7
PAINLEVEL_OUTOF10: 6
PAINLEVEL_OUTOF10: 2
PAINLEVEL_OUTOF10: 3
PAINLEVEL_OUTOF10: 4
PAINLEVEL_OUTOF10: 5 - MODERATE PAIN
PAINLEVEL_OUTOF10: 4
PAINLEVEL_OUTOF10: 0 - NO PAIN
PAINLEVEL_OUTOF10: 9
PAINLEVEL_OUTOF10: 9
PAINLEVEL_OUTOF10: 0 - NO PAIN
PAINLEVEL_OUTOF10: 7

## 2024-11-02 ASSESSMENT — PAIN - FUNCTIONAL ASSESSMENT
PAIN_FUNCTIONAL_ASSESSMENT: 0-10

## 2024-11-02 ASSESSMENT — PAIN DESCRIPTION - LOCATION: LOCATION: ABDOMEN

## 2024-11-02 NOTE — PROGRESS NOTES
Internal Medicine - Daily Progress Note   Hospital Day: 2       Name:Taqueria Montano, AGE: 64 y.o., GENDER: male, MRN: [id number], ROOM: 239/239-A   CODE STATUS: Full Code  Attending Physician: João Dubois DO  Resident: Neva To DO        Chief Complaint   Perforated gastric ulcer       Subjective    Taqueria Montano is a 64 y.o. year old male patient on Hospital Day: 2    Overnight events: No acute overnight events.    Patient seen and examined at bedside this morning. He has no acute complaints at this time. He denies chest pain, SOB, lower extremity swelling.      Meds    Scheduled medications  acetaminophen, 1,000 mg, intravenous, q6h DM  amLODIPine, 10 mg, oral, Daily  [Held by provider] aspirin, 81 mg, oral, Daily  clarithromycin, 500 mg, nasogastric tube, q12h DM  enoxaparin, 40 mg, subcutaneous, q24h  fluticasone, 2 spray, Each Nostril, Daily  folic acid, 1 mg, intravenous, Daily  gabapentin, 600 mg, nasogastric tube, q8h DM  ipratropium-albuteroL, 3 mL, nebulization, 4x daily  ketorolac, 15 mg, intravenous, q6h DM  [Held by provider] meloxicam, 15 mg, oral, Daily  methocarbamol, 500 mg, intravenous, q8h  methylPREDNISolone sodium succinate (PF), 40 mg, intravenous, Daily  multivitamin with minerals, 1 tablet, oral, Daily  nicotine, 1 patch, transdermal, Daily  pantoprazole, 40 mg, intravenous, BID  piperacillin-tazobactam, 4.5 g, intravenous, q6h  polyethylene glycol, 17 g, nasogastric tube, Daily  sucralfate, 1 g, nasogastric tube, q6h DM  [START ON 11/4/2024] thiamine, 100 mg, oral, Daily  thiamine, 100 mg, intravenous, Daily      Continuous medications  sodium chloride 0.9%, 100 mL/hr, Last Rate: 100 mL/hr (11/02/24 1331)      PRN medications  PRN medications: albuterol, [Held by provider] albuterol, benzocaine-menthol, HYDROmorphone, HYDROmorphone, LORazepam **OR** LORazepam **OR** LORazepam, oxygen     Objective    Physical Exam  Constitutional:       General: He is not in acute  "distress.  HENT:      Head: Normocephalic and atraumatic.      Nose:      Comments: NG tube in place  Cardiovascular:      Rate and Rhythm: Normal rate and regular rhythm.      Heart sounds: Normal heart sounds.   Pulmonary:      Effort: Pulmonary effort is normal. No respiratory distress.      Breath sounds: Normal breath sounds. No wheezing.   Abdominal:      Comments: Patient with closed laparoscopic incisions that are clean and dry  Has drain located on right side draining red fluid   Musculoskeletal:      Right lower leg: No edema.      Left lower leg: No edema.   Skin:     General: Skin is warm and dry.   Neurological:      Mental Status: He is alert and oriented to person, place, and time.   Psychiatric:         Mood and Affect: Mood normal.         Behavior: Behavior normal.        Visit Vitals  /72   Pulse 87   Temp 37.1 °C (98.8 °F)   Resp 20   Ht 1.753 m (5' 9.02\")   Wt 73.7 kg (162 lb 7.7 oz)   SpO2 90%   BMI 23.98 kg/m²   Smoking Status Every Day   BSA 1.89 m²        Intake/Output Summary (Last 24 hours) at 11/2/2024 1354  Last data filed at 11/2/2024 1331  Gross per 24 hour   Intake 2884.67 ml   Output 2835 ml   Net 49.67 ml       Labs:   Results from last 72 hours   Lab Units 11/02/24  0607 11/01/24  0720 11/01/24  0212   SODIUM mmol/L 132* 128* 126*   POTASSIUM mmol/L 3.7 4.3 4.7   CHLORIDE mmol/L 103 96* 95*   CO2 mmol/L 23 22 18*   BUN mg/dL 13 13 13   CREATININE mg/dL 0.73 1.15 1.19   GLUCOSE mg/dL 115* 114* 75   CALCIUM mg/dL 7.0* 7.7* 7.9*   ANION GAP mmol/L 10 14 18   EGFR mL/min/1.73m*2 >90 72 69   PHOSPHORUS mg/dL 2.2* 2.9 4.2      Results from last 72 hours   Lab Units 11/02/24  0607 11/01/24  0720 10/31/24  1755   WBC AUTO x10*3/uL 16.3* 10.5 9.9   HEMOGLOBIN g/dL 10.8* 13.5 16.6   HEMATOCRIT % 30.8* 38.9* 46.0   PLATELETS AUTO x10*3/uL 332 389 479*   NEUTROS PCT AUTO %  --   --  76.1   LYMPHS PCT AUTO %  --   --  16.3   MONOS PCT AUTO %  --   --  5.2   EOS PCT AUTO %  --   --  1.4    " "  Lab Results   Component Value Date    CALCIUM 7.0 (L) 11/02/2024    PHOS 2.2 (L) 11/02/2024      No results found for: \"CRP\"    [unfilled]     Micro/ID:   No results found for the last 90 days.                   No lab exists for component: \"AGALPCRNB\"     Lab Results   Component Value Date    BLOODCULT No growth at 1 day 10/31/2024       Images    ECG 12 lead    Result Date: 11/1/2024   Poor data quality, interpretation may be adversely affected Normal sinus rhythm Possible Left atrial enlargement Cannot rule out Anterior infarct , age undetermined Abnormal ECG No previous ECGs available    ECG 12 lead    Result Date: 11/1/2024  Normal sinus rhythm Poor R-wave progression ; consider septal infarct, lead placement, or normal variant Abnormal ECG When compared with ECG of 31-OCT-2024 17:01, (unconfirmed) No significant change was found    XR chest 1 view    Result Date: 11/1/2024  Interpreted By:  Stanley Harris, STUDY: XR CHEST 1 VIEW;  11/1/2024 1:46 am   INDICATION: Signs/Symptoms:Ng tube.     COMPARISON: 10/31/2024   ACCESSION NUMBER(S): [id number]   ORDERING CLINICIAN: DIAMOND MIX   FINDINGS: - NG/OG tube termination: Gastric fundus   Low lung volumes results in basilar and perihilar bronchovascular crowding; however, there is no evidence of consolidation or effusion. There is bibasilar atelectasis.   No pneumothorax.   Gaseous distension of the colon with colonic interposition. There is scattered pneumoperitoneum most pronounced in the right upper quadrant.       NG/OG tube terminates over the gastric fundus.   Similar appearance of pneumoperitoneum in the right upper quadrant.   MACRO: None.   Signed by: Stanley Harris 11/1/2024 1:50 AM Dictation workstation:   [id number]    XR chest 1 view    Result Date: 10/31/2024  Interpreted By:  Nuno Guillory, STUDY: XR CHEST 1 VIEW;; 10/31/2024 7:57 pm   INDICATION: Signs/Symptoms:sob.   COMPARISON: 11/28/2014 and CT abdomen from 10/31/2024   " ACCESSION NUMBER(S): [id number]   ORDERING CLINICIAN: MATTHIAS EARL   FINDINGS: Right subdiaphragmatic air lucency similar to previous CT scan of the abdomen, increased in size since prior suggestive of free intraperitoneal air. Mild left basilar opacity suggestive atelectasis/scarring or infiltrate. The remainder lung fields are clear bilaterally. Tortuous and possibly ectatic aorta with wall calcification. Aneurysm or dissection can not be excluded in the basis of this exam only.       1. Mild left basilar atelectasis/scarring or infiltrate. Recommend clinical correlation and follow-up with PA and lateral chest x-rays to document resolution. 2. Tortuous a possibly ectatic aorta with wall calcification but not significantly changed since previous exam accounting for difference in technique of imaging. If there is clinical concern for acute aortic pathology or pulmonary embolic disease, further evaluation with chest CT is recommended for better assessment. 3. Lucency within the right subdiaphragmatic space is abnormal and may reflect increased in size of intraperitoneal air such as related to previous surgery or bowel perforation. Recommend clinical correlation and follow-up.     SUPPLEMENTAL INFORMATION: Notifi message was left for MATTHIAS EARL regarding this exam by Dr. Guillory on 10/31/2024 at approximately 20:29 hours.     Critical Finding:  See findings. Notification was initiated on 10/31/2024 at 8:28 pm by  Nuno Guillory.  (**-OCF-**) Instructions:   Signed by: Nuno Guillory 10/31/2024 8:29 PM Dictation workstation:   [id number]    CT abdomen pelvis w IV contrast    Result Date: 10/31/2024  Interpreted By:  Alec Llamas, STUDY: CT ABDOMEN PELVIS W IV CONTRAST;  10/31/2024 6:52 pm   INDICATION: Signs/Symptoms:abdominal pain.   COMPARISON: None.   ACCESSION NUMBER(S): [id number]   ORDERING CLINICIAN: MATTHIAS EARL   TECHNIQUE: Contiguous axial images of the abdomen and pelvis were obtained after the  intravenous administration of  contrast. Coronal and sagittal reformatted images were obtained from the axial images.   FINDINGS: There is limited evaluation of the lung bases. Bibasilar subsegmental atelectasis.   No evidence of liver mass. The gallbladder is present. No significant dilatation common bile duct.   The pancreas, spleen, and adrenal glands appear unremarkable.   Symmetric enhancement of the kidneys. A subcentimeter hypodensity in the upper pole the left kidney is too small to characterize. No hydronephrosis.   Atherosclerotic calcification of the aorta and abdominopelvic vasculature. There is 2.4 cm area of mild ectasia of the infrarenal abdominal aorta.   There is wall thickening and irregularity of the stomach. There is 4 cm x 1.4 cm irregular heterogeneous area of hypoattenuation in the wall of the greater curvature of the stomach on coronal image 35 of 119 concerning for underlying gastric ulcer. There is evidence of free abdominal air compatible with viscus perforation. There is also small amount of free fluid in the abdomen and pelvis. There is peripheral air in segment of the proximal transverse colon in the right abdomen.   Urinary bladder is underdistended and urinary bladder wall thickening is not excluded.   No acute fracture of the lumbar spine.       Wall thickening and irregularity of the stomach. There is 4 cm x 1.4 cm irregular heterogeneous area of hypoattenuation in the wall of the greater curvature of the stomach on coronal image 35 of 119 concerning for underlying gastric ulcer. There is evidence of free abdominal air compatible with viscus perforation and the findings are concerning for perforated gastric ulcer. Other source of perforation of the bowel is also not excluded. Heterogeneous hyperdensity in the stomach lumen may correspond to hemorrhage. Small amount of free fluid in the abdomen and pelvis likely relating to the perforation.   Foci of peripheral air in segment of the  proximal transverse colon the right abdomen and pneumatosis ischemia is not excluded.   Underdistention versus urinary bladder wall thickening; please correlate urinalysis to exclude cystitis.   MACRO: Alec Llamas discussed the significance and urgency of this critical finding by telephone with the emergency room physician on 10/31/2024 at 7:55 pm.  (**-RCF-**) Findings:  See findings.   Signed by: Alec Llamas 10/31/2024 7:55 PM Dictation workstation:   [id number]      Assessment and Plan    Taqueria Montano is a 64 y.o. male with a PMH of COPD, alcohol use disorder, tobacco use disorder, HTN, spinal stenosis of cervical region, neuropathy. Admitted for management of perforated gastric ulcer.     ACUTE MEDICAL ISSUES:  #Perforated gastric ulcer  #Sepsis, resolved  #Elevated lactate, resolved  - Perforated gastric ulcer seen on CT abdomen/pelvis  - Underwent anibal patch repair of perforated gastric ulcer 11/1 - R-side abdominal drain placed  - Surgery following  - NG tube in place w/ suction  - Patient to remain NPO, pending surgery recs  - Zosyn 4.5g q6  - Protonix 40mg daily  - Blood cultures, no growth at day 1  - Telemetry monitoring   - Will discontinue home meloxicam at discharge  - Pain regiment: Tylenol 650 mg q6 hrs PRN mild pain, Dilaudid 0.2/0.4 mg q4 hrs PRN moderate/severe pain  - Surgery recommends keeping NG tube through the weekend and potentially doing gastrografin study on Monday  - Okay per surgery to resume lovenox today    #Hyponatremia, improving  - Suspect 2/2 to beer potomania given hx  - Serum osm 273, urine osm and urine electrolytes  wnl     #Acute hypoxic respiratory failure:   - Difficult to assess whether 2/2 to COPD exacerbation vs pain/abdominal distention vs. CAP  - 5L NC - wean as tolerated  - Duo-nebs q6 scheduled + q2 PRN  - Solu-medrol 40mg IV  - Encourage incentive spirometry  - If this were 2/2 CAP - patient is on antibiotics that cover  - Sputum culture, urine legionella,  and urine strep pneumo antigens pending    ADDRESSED/ RESOLVED MEDICAL ISSUES:  #Tortuous aorta  - Incidental finding on initial CXR - appears stable from previous imaging  - Can consider CT chest for further workup as outpatient    CHRONIC MEDICAL ISSUES:  #Alcohol use disorder - CIWA protocol initiated, thiamine, folate  #Nicotine use disorder - Nicotine patch   #Spinal stenosis, DDD cervical region - continue home gabapentin 600mg TID, baclofen 10 mg TID PRN, meloxicam 15mg (holding) will discontinue this at discharge  #HTN - amlodipine 10mg  #Preventative - ASA 81mg daily (held)      Fluids: NS 100cc/hr  Electrolytes: Replete PRN  Nutrition: NPO  Antimicrobials: Zosyn  DVT ppx: lovenox - okay per surgery team   GI ppx: Protonix  Catheter: Patterson in place  Lines: PIV  Supplemental Oxygen: 4L NC  Emergency Contact: Extended Emergency Contact Information  Primary Emergency Contact: Paige Sharma  Mobile Phone: [phone]  Relation: Spouse  Preferred language: English   needed? No   Code: Full Code     Disposition: 64 year old male admitted for management of perforated gastric ulcer s/p anibal patch. Pending surgery recommendations for NG tube removal and advancement of diet. Pending improvement of respiratory status.     Zulma Moore MD  Internal Medicine, PGY- 1  11/02/24 at 1:54 PM

## 2024-11-02 NOTE — PROGRESS NOTES
11/02/24 1023   Discharge Planning   Assistance Needed SW met with pt at bedside to sign forms for pending medicaid for [facility]. SW emailed signed forms to [facility].

## 2024-11-02 NOTE — PROGRESS NOTES
General Surgery Daily Progress Note      Subjective:  The patient is doing well today.  He is no acute distress.  The patient denies nausea and vomiting.  He reports feeling better.        Objective:  Vitals:   Vitals:    11/02/24 1331   BP: 138/76   Pulse: 91   Resp: 15   Temp: 36.9 °C (98.4 °F)   SpO2: 91%       Physical Exam:   General: No acute distress. Sitting up in bed.   Neuro: Alert and oriented ×3. Follows commands.  Head: Atraumatic  Eyes: Pupils equal reactive to light. Extraocular motions intact.  Ears: Hears normal speaking voice.  Neck: Supple. No appreciable masses.  Heart: Regular rate  Lungs: No audible wheeze.  Breathing comfortably.  Abdomen: Soft. Nondistended. Appropriately Tender.  Incision clean, dry, and intact.  DANIELLE serous.  Genitourinary: Patterson in place  Musculoskeletal: Moves all extremities.  Normal range of motion.  Skin: No rashes or lesions.  Psychological: Normal affect        Labs:  CBC:   Lab Results   Component Value Date    WBC 16.3 (H) 11/02/2024    RBC 3.19 (L) 11/02/2024    HGB 10.8 (L) 11/02/2024    HCT 30.8 (L) 11/02/2024     11/02/2024       RFP:   Lab Results   Component Value Date     (L) 11/02/2024    K 3.7 11/02/2024     11/02/2024    CO2 23 11/02/2024    BUN 13 11/02/2024    CREATININE 0.73 11/02/2024    CALCIUM 7.0 (L) 11/02/2024    MG 1.89 11/02/2024    PHOS 2.2 (L) 11/02/2024        LFTs:   Lab Results   Component Value Date    PROT 5.8 (L) 11/02/2024    ALBUMIN 2.7 (L) 11/02/2024    BILITOT 0.4 11/02/2024    BILIDIR 0.1 11/02/2024    ALKPHOS 41 11/02/2024    AST 41 (H) 11/02/2024    ALT 33 11/02/2024              Images:    None      Assessment:  This is a 64 y.o. year old male who is Post Operative Day #1 from Laparosocpic anibal patch for perforated gastric ulcer.  He has been doing well.      Plan:  -- NG tube to continuous suction  -- Zosyn q6  -- Clarithromycin  -- Multimodal pain  -- PPI BID  -- Carafate QID  -- Remove Patterson  -- DANIELLE  loulou Byrd MD  General Surgery  Office: [phone]  Fax: [phone]  3:01 PM  11/02/24

## 2024-11-02 NOTE — CARE PLAN
The patient's goals for the shift include      The clinical goals for the shift include pain will be controlled less than 7/10 throughout shift    Pt getting IV dilaudid for severe pain, pt given tylenol to help with longer acting coverage for pain

## 2024-11-03 VITALS
BODY MASS INDEX: 24.07 KG/M2 | OXYGEN SATURATION: 94 % | RESPIRATION RATE: 20 BRPM | TEMPERATURE: 99.3 F | DIASTOLIC BLOOD PRESSURE: 76 MMHG | HEIGHT: 69 IN | SYSTOLIC BLOOD PRESSURE: 156 MMHG | WEIGHT: 162.48 LBS | HEART RATE: 83 BPM

## 2024-11-03 LAB
ALBUMIN SERPL BCP-MCNC: 3 G/DL (ref 3.4–5)
ANION GAP SERPL CALC-SCNC: 12 MMOL/L (ref 10–20)
BACTERIA BLD CULT: NORMAL
BACTERIA BLD CULT: NORMAL
BUN SERPL-MCNC: 12 MG/DL (ref 6–23)
CALCIUM SERPL-MCNC: 8.4 MG/DL (ref 8.6–10.3)
CHLORIDE SERPL-SCNC: 100 MMOL/L (ref 98–107)
CO2 SERPL-SCNC: 24 MMOL/L (ref 21–32)
CREAT SERPL-MCNC: 0.7 MG/DL (ref 0.5–1.3)
EGFRCR SERPLBLD CKD-EPI 2021: >90 ML/MIN/1.73M*2
ERYTHROCYTE [DISTWIDTH] IN BLOOD BY AUTOMATED COUNT: 13.4 % (ref 11.5–14.5)
GLUCOSE SERPL-MCNC: 111 MG/DL (ref 74–99)
HCT VFR BLD AUTO: 34.3 % (ref 41–52)
HGB BLD-MCNC: 12.2 G/DL (ref 13.5–17.5)
MAGNESIUM SERPL-MCNC: 2.08 MG/DL (ref 1.6–2.4)
MCH RBC QN AUTO: 33.6 PG (ref 26–34)
MCHC RBC AUTO-ENTMCNC: 35.6 G/DL (ref 32–36)
MCV RBC AUTO: 95 FL (ref 80–100)
NRBC BLD-RTO: 0 /100 WBCS (ref 0–0)
PHOSPHATE SERPL-MCNC: 3.5 MG/DL (ref 2.5–4.9)
PLATELET # BLD AUTO: 400 X10*3/UL (ref 150–450)
POTASSIUM SERPL-SCNC: 4 MMOL/L (ref 3.5–5.3)
RBC # BLD AUTO: 3.63 X10*6/UL (ref 4.5–5.9)
SODIUM SERPL-SCNC: 132 MMOL/L (ref 136–145)
WBC # BLD AUTO: 20.3 X10*3/UL (ref 4.4–11.3)

## 2024-11-03 PROCEDURE — 2500000001 HC RX 250 WO HCPCS SELF ADMINISTERED DRUGS (ALT 637 FOR MEDICARE OP): Performed by: SURGERY

## 2024-11-03 PROCEDURE — 94760 N-INVAS EAR/PLS OXIMETRY 1: CPT

## 2024-11-03 PROCEDURE — 99232 SBSQ HOSP IP/OBS MODERATE 35: CPT

## 2024-11-03 PROCEDURE — S4991 NICOTINE PATCH NONLEGEND: HCPCS

## 2024-11-03 PROCEDURE — 2500000004 HC RX 250 GENERAL PHARMACY W/ HCPCS (ALT 636 FOR OP/ED): Performed by: SURGERY

## 2024-11-03 PROCEDURE — 85027 COMPLETE CBC AUTOMATED: CPT

## 2024-11-03 PROCEDURE — 36415 COLL VENOUS BLD VENIPUNCTURE: CPT

## 2024-11-03 PROCEDURE — 80069 RENAL FUNCTION PANEL: CPT

## 2024-11-03 PROCEDURE — 2060000001 HC INTERMEDIATE ICU ROOM DAILY

## 2024-11-03 PROCEDURE — 2500000004 HC RX 250 GENERAL PHARMACY W/ HCPCS (ALT 636 FOR OP/ED)

## 2024-11-03 PROCEDURE — 2500000005 HC RX 250 GENERAL PHARMACY W/O HCPCS: Performed by: INTERNAL MEDICINE

## 2024-11-03 PROCEDURE — 2500000002 HC RX 250 W HCPCS SELF ADMINISTERED DRUGS (ALT 637 FOR MEDICARE OP, ALT 636 FOR OP/ED)

## 2024-11-03 PROCEDURE — 2500000002 HC RX 250 W HCPCS SELF ADMINISTERED DRUGS (ALT 637 FOR MEDICARE OP, ALT 636 FOR OP/ED): Performed by: SURGERY

## 2024-11-03 PROCEDURE — 94640 AIRWAY INHALATION TREATMENT: CPT

## 2024-11-03 PROCEDURE — 83735 ASSAY OF MAGNESIUM: CPT

## 2024-11-03 RX ORDER — DEXTROSE MONOHYDRATE, SODIUM CHLORIDE, AND POTASSIUM CHLORIDE 50; 1.49; 9 G/1000ML; G/1000ML; G/1000ML
100 INJECTION, SOLUTION INTRAVENOUS CONTINUOUS
Status: DISPENSED | OUTPATIENT
Start: 2024-11-03

## 2024-11-03 RX ADMIN — ACETAMINOPHEN 1000 MG: 10 INJECTION INTRAVENOUS at 17:44

## 2024-11-03 RX ADMIN — KETOROLAC TROMETHAMINE 15 MG: 15 INJECTION, SOLUTION INTRAMUSCULAR; INTRAVENOUS at 12:58

## 2024-11-03 RX ADMIN — ALBUTEROL SULFATE 2.5 MG: 2.5 SOLUTION RESPIRATORY (INHALATION) at 05:32

## 2024-11-03 RX ADMIN — PANTOPRAZOLE SODIUM 40 MG: 40 INJECTION, POWDER, FOR SOLUTION INTRAVENOUS at 20:26

## 2024-11-03 RX ADMIN — Medication 6 L/MIN: at 19:17

## 2024-11-03 RX ADMIN — KETOROLAC TROMETHAMINE 15 MG: 15 INJECTION, SOLUTION INTRAMUSCULAR; INTRAVENOUS at 05:27

## 2024-11-03 RX ADMIN — METHOCARBAMOL 500 MG: 100 INJECTION INTRAMUSCULAR; INTRAVENOUS at 01:52

## 2024-11-03 RX ADMIN — CLARITHROMYCIN 500 MG: 250 FOR SUSPENSION ORAL at 13:00

## 2024-11-03 RX ADMIN — ENOXAPARIN SODIUM 40 MG: 40 INJECTION SUBCUTANEOUS at 09:09

## 2024-11-03 RX ADMIN — FLUTICASONE PROPIONATE 2 SPRAY: 50 SPRAY, METERED NASAL at 09:09

## 2024-11-03 RX ADMIN — KETOROLAC TROMETHAMINE 15 MG: 15 INJECTION, SOLUTION INTRAMUSCULAR; INTRAVENOUS at 17:45

## 2024-11-03 RX ADMIN — IPRATROPIUM BROMIDE AND ALBUTEROL SULFATE 3 ML: .5; 3 SOLUTION RESPIRATORY (INHALATION) at 07:49

## 2024-11-03 RX ADMIN — IPRATROPIUM BROMIDE AND ALBUTEROL SULFATE 3 ML: .5; 3 SOLUTION RESPIRATORY (INHALATION) at 11:05

## 2024-11-03 RX ADMIN — SODIUM CHLORIDE 100 ML/HR: 9 INJECTION, SOLUTION INTRAVENOUS at 00:47

## 2024-11-03 RX ADMIN — HYDROMORPHONE HYDROCHLORIDE 0.4 MG: 1 INJECTION, SOLUTION INTRAMUSCULAR; INTRAVENOUS; SUBCUTANEOUS at 05:35

## 2024-11-03 RX ADMIN — GABAPENTIN 600 MG: 250 SOLUTION ORAL at 05:27

## 2024-11-03 RX ADMIN — IPRATROPIUM BROMIDE AND ALBUTEROL SULFATE 3 ML: .5; 3 SOLUTION RESPIRATORY (INHALATION) at 19:17

## 2024-11-03 RX ADMIN — HYDROMORPHONE HYDROCHLORIDE 0.2 MG: 1 INJECTION, SOLUTION INTRAMUSCULAR; INTRAVENOUS; SUBCUTANEOUS at 20:26

## 2024-11-03 RX ADMIN — FOLIC ACID 1 MG: 5 INJECTION, SOLUTION INTRAMUSCULAR; INTRAVENOUS; SUBCUTANEOUS at 09:14

## 2024-11-03 RX ADMIN — METHOCARBAMOL 500 MG: 100 INJECTION INTRAMUSCULAR; INTRAVENOUS at 13:07

## 2024-11-03 RX ADMIN — THIAMINE HYDROCHLORIDE 100 MG: 100 INJECTION, SOLUTION INTRAMUSCULAR; INTRAVENOUS at 09:09

## 2024-11-03 RX ADMIN — NICOTINE 1 PATCH: 21 PATCH, EXTENDED RELEASE TRANSDERMAL at 09:09

## 2024-11-03 RX ADMIN — ACETAMINOPHEN 1000 MG: 10 INJECTION INTRAVENOUS at 05:27

## 2024-11-03 RX ADMIN — METHOCARBAMOL 500 MG: 100 INJECTION INTRAMUSCULAR; INTRAVENOUS at 17:45

## 2024-11-03 RX ADMIN — SUCRALFATE 1 G: 1 SUSPENSION ORAL at 12:58

## 2024-11-03 RX ADMIN — POTASSIUM CHLORIDE, DEXTROSE MONOHYDRATE AND SODIUM CHLORIDE 100 ML/HR: 150; 5; 900 INJECTION, SOLUTION INTRAVENOUS at 14:32

## 2024-11-03 RX ADMIN — HYDROMORPHONE HYDROCHLORIDE 0.4 MG: 1 INJECTION, SOLUTION INTRAMUSCULAR; INTRAVENOUS; SUBCUTANEOUS at 01:52

## 2024-11-03 RX ADMIN — CLARITHROMYCIN 500 MG: 250 FOR SUSPENSION ORAL at 20:26

## 2024-11-03 RX ADMIN — PANTOPRAZOLE SODIUM 40 MG: 40 INJECTION, POWDER, FOR SOLUTION INTRAVENOUS at 09:08

## 2024-11-03 RX ADMIN — PIPERACILLIN SODIUM AND TAZOBACTAM SODIUM 4.5 G: 4; .5 INJECTION, SOLUTION INTRAVENOUS at 01:52

## 2024-11-03 RX ADMIN — SUCRALFATE 1 G: 1 SUSPENSION ORAL at 17:45

## 2024-11-03 RX ADMIN — GABAPENTIN 600 MG: 250 SOLUTION ORAL at 21:14

## 2024-11-03 RX ADMIN — SUCRALFATE 1 G: 1 SUSPENSION ORAL at 05:27

## 2024-11-03 RX ADMIN — PIPERACILLIN SODIUM AND TAZOBACTAM SODIUM 4.5 G: 4; .5 INJECTION, SOLUTION INTRAVENOUS at 14:32

## 2024-11-03 RX ADMIN — HYDROMORPHONE HYDROCHLORIDE 0.4 MG: 1 INJECTION, SOLUTION INTRAMUSCULAR; INTRAVENOUS; SUBCUTANEOUS at 11:12

## 2024-11-03 RX ADMIN — ALBUTEROL SULFATE 2.5 MG: 2.5 SOLUTION RESPIRATORY (INHALATION) at 21:16

## 2024-11-03 RX ADMIN — Medication 6 L/MIN: at 07:49

## 2024-11-03 RX ADMIN — PIPERACILLIN SODIUM AND TAZOBACTAM SODIUM 4.5 G: 4; .5 INJECTION, SOLUTION INTRAVENOUS at 20:25

## 2024-11-03 RX ADMIN — PIPERACILLIN SODIUM AND TAZOBACTAM SODIUM 4.5 G: 4; .5 INJECTION, SOLUTION INTRAVENOUS at 09:14

## 2024-11-03 ASSESSMENT — PAIN - FUNCTIONAL ASSESSMENT
PAIN_FUNCTIONAL_ASSESSMENT: 0-10
PAIN_FUNCTIONAL_ASSESSMENT: UNABLE TO SELF-REPORT

## 2024-11-03 ASSESSMENT — LIFESTYLE VARIABLES
AUDITORY DISTURBANCES: NOT PRESENT
ORIENTATION AND CLOUDING OF SENSORIUM: ORIENTED AND CAN DO SERIAL ADDITIONS
TOTAL SCORE: 0
AUDITORY DISTURBANCES: NOT PRESENT
AGITATION: NORMAL ACTIVITY
BLOOD PRESSURE: 140/69
TOTAL SCORE: 0
ORIENTATION AND CLOUDING OF SENSORIUM: ORIENTED AND CAN DO SERIAL ADDITIONS
PAROXYSMAL SWEATS: NO SWEAT VISIBLE
AUDITORY DISTURBANCES: NOT PRESENT
VISUAL DISTURBANCES: NOT PRESENT
NAUSEA AND VOMITING: NO NAUSEA AND NO VOMITING
ANXIETY: NO ANXIETY, AT EASE
ORIENTATION AND CLOUDING OF SENSORIUM: ORIENTED AND CAN DO SERIAL ADDITIONS
PAROXYSMAL SWEATS: NO SWEAT VISIBLE
ORIENTATION AND CLOUDING OF SENSORIUM: ORIENTED AND CAN DO SERIAL ADDITIONS
AGITATION: NORMAL ACTIVITY
HEADACHE, FULLNESS IN HEAD: NOT PRESENT
VISUAL DISTURBANCES: NOT PRESENT
PAROXYSMAL SWEATS: NO SWEAT VISIBLE
ANXIETY: NO ANXIETY, AT EASE
AGITATION: NORMAL ACTIVITY
AGITATION: NORMAL ACTIVITY
TREMOR: NO TREMOR
AUDITORY DISTURBANCES: NOT PRESENT
NAUSEA AND VOMITING: NO NAUSEA AND NO VOMITING
VISUAL DISTURBANCES: NOT PRESENT
PAROXYSMAL SWEATS: NO SWEAT VISIBLE
HEADACHE, FULLNESS IN HEAD: NOT PRESENT
NAUSEA AND VOMITING: NO NAUSEA AND NO VOMITING
TREMOR: NOT VISIBLE, BUT CAN BE FELT FINGERTIP TO FINGERTIP
VISUAL DISTURBANCES: NOT PRESENT
NAUSEA AND VOMITING: NO NAUSEA AND NO VOMITING
ANXIETY: NO ANXIETY, AT EASE
HEADACHE, FULLNESS IN HEAD: NOT PRESENT
TOTAL SCORE: 2
HEADACHE, FULLNESS IN HEAD: NOT PRESENT
PULSE: 76
ANXIETY: MILDLY ANXIOUS

## 2024-11-03 ASSESSMENT — PAIN SCALES - GENERAL
PAINLEVEL_OUTOF10: 9
PAINLEVEL_OUTOF10: 6
PAINLEVEL_OUTOF10: 0 - NO PAIN
PAINLEVEL_OUTOF10: 2
PAINLEVEL_OUTOF10: 8
PAINLEVEL_OUTOF10: 5 - MODERATE PAIN
PAINLEVEL_OUTOF10: 9

## 2024-11-03 ASSESSMENT — PAIN DESCRIPTION - LOCATION
LOCATION: ABDOMEN

## 2024-11-03 NOTE — PROGRESS NOTES
General Surgery Daily Progress Note      Subjective:  The patient is doing well today.  He is no acute distress.  The patient denies nausea and vomiting.  He reports feeling better.        Objective:  Vitals:   Vitals:    11/03/24 1015   BP: 140/71   Pulse: 85   Resp: 16   Temp: 36.6 °C (97.9 °F)   SpO2: 92%       Physical Exam:   General: No acute distress. Sitting up in bed.   Neuro: Alert and oriented ×3. Follows commands.  Head: Atraumatic  Eyes: Pupils equal reactive to light. Extraocular motions intact.  Ears: Hears normal speaking voice.  Neck: Supple. No appreciable masses.  Heart: Regular rate  Lungs: No audible wheeze.  Breathing comfortably.  Abdomen: Soft. Nondistended. Appropriately Tender.  Incision clean, dry, and intact.  DANIELLE serous.  Genitourinary: Patterson in place  Musculoskeletal: Moves all extremities.  Normal range of motion.  Skin: No rashes or lesions.  Psychological: Normal affect        Labs:  CBC:   Lab Results   Component Value Date    WBC 20.3 (H) 11/03/2024    RBC 3.63 (L) 11/03/2024    HGB 12.2 (L) 11/03/2024    HCT 34.3 (L) 11/03/2024     11/03/2024       RFP:   Lab Results   Component Value Date     (L) 11/03/2024    K 4.0 11/03/2024     11/03/2024    CO2 24 11/03/2024    BUN 12 11/03/2024    CREATININE 0.70 11/03/2024    CALCIUM 8.4 (L) 11/03/2024    MG 2.08 11/03/2024    PHOS 3.5 11/03/2024        LFTs:   Lab Results   Component Value Date    PROT 5.8 (L) 11/02/2024    ALBUMIN 3.0 (L) 11/03/2024    BILITOT 0.4 11/02/2024    BILIDIR 0.1 11/02/2024    ALKPHOS 41 11/02/2024    AST 41 (H) 11/02/2024    ALT 33 11/02/2024              Images:    None      Assessment:  This is a 64 y.o. year old male who is Post Operative Day #1 from Laparosocpic anibal patch for perforated gastric ulcer.  He has been doing well.      Plan:  -- NG tube to continuous suction  -- Zosyn q6  -- Clarithromycin  -- Multimodal pain  -- PPI BID  -- Carafate QID  -- DANIELLE Braun  MD [facility]  General Surgery  Office: [phone]  Fax: [phone]  1:58 PM  11/03/24

## 2024-11-03 NOTE — PROGRESS NOTES
Internal Medicine - Daily Progress Note   Hospital Day: 3       Name:Taqueria Montano, AGE: 64 y.o., GENDER: male, MRN: [id number], ROOM: 239/239-A   CODE STATUS: Full Code  Attending Physician: João Dubois DO  Resident: Zulma Moore MD        Chief Complaint   Perforated gastric ulcer       Subjective    Taqueria Montano is a 64 y.o. year old male patient on Hospital Day: 3    Overnight events: No acute events overnight    Patient seen and examined at bedside this morning. States he is feeling okay overall. Abdominal pain is tolerable. NG tube and DANIELLE drain still in place and patient is NPO. He has had a bowel movement.  In terms of breathing, notes that shortness of breath comes and goes.  He notes shortness of breath when he is up using the restroom.  Denies other acute complaints or concerns.      Meds    Scheduled medications  acetaminophen, 1,000 mg, intravenous, q6h DM  amLODIPine, 10 mg, oral, Daily  [Held by provider] aspirin, 81 mg, oral, Daily  clarithromycin, 500 mg, nasogastric tube, q12h DM  enoxaparin, 40 mg, subcutaneous, q24h  fluticasone, 2 spray, Each Nostril, Daily  folic acid, 1 mg, intravenous, Daily  gabapentin, 600 mg, nasogastric tube, q8h DM  ipratropium-albuteroL, 3 mL, nebulization, 4x daily  ketorolac, 15 mg, intravenous, q6h DM  [Held by provider] meloxicam, 15 mg, oral, Daily  methocarbamol, 500 mg, intravenous, q8h  multivitamin with minerals, 1 tablet, oral, Daily  nicotine, 1 patch, transdermal, Daily  pantoprazole, 40 mg, intravenous, BID  piperacillin-tazobactam, 4.5 g, intravenous, q6h  polyethylene glycol, 17 g, nasogastric tube, Daily  sucralfate, 1 g, nasogastric tube, q6h DM  [START ON 11/4/2024] thiamine, 100 mg, oral, Daily      Continuous medications  potassium chloride-D5-0.9%NaCl, 100 mL/hr, Last Rate: 100 mL/hr (11/03/24 1432)      PRN medications  PRN medications: albuterol, [Held by provider] albuterol, benzocaine-menthol, HYDROmorphone, HYDROmorphone,  "LORazepam **OR** LORazepam **OR** LORazepam, oxygen     Objective    Physical Exam  Constitutional:       General: He is not in acute distress.  HENT:      Head: Normocephalic and atraumatic.      Nose:      Comments: NG tube in place     Mouth/Throat:      Mouth: Mucous membranes are dry.      Pharynx: Oropharynx is clear.   Cardiovascular:      Rate and Rhythm: Normal rate and regular rhythm.      Heart sounds: Murmur heard.   Pulmonary:      Effort: Pulmonary effort is normal. No respiratory distress.      Breath sounds: Normal breath sounds. No stridor. No wheezing or rales.   Abdominal:      General: There is no distension.      Palpations: Abdomen is soft.      Tenderness: There is abdominal tenderness. There is no guarding.      Comments: Patient with closed laparoscopic incisions that are clean and dry  Has R-sided abdominal drain in place draining red fluid   Musculoskeletal:      Right lower leg: No edema.      Left lower leg: No edema.   Skin:     General: Skin is warm and dry.   Neurological:      General: No focal deficit present.      Mental Status: He is alert.   Psychiatric:         Mood and Affect: Mood normal.         Behavior: Behavior normal.        Visit Vitals  /76   Pulse 76   Temp 36.6 °C (97.9 °F)   Resp 15   Ht 1.753 m (5' 9.02\")   Wt 73.7 kg (162 lb 7.7 oz)   SpO2 96%   BMI 23.98 kg/m²   Smoking Status Every Day   BSA 1.89 m²        Intake/Output Summary (Last 24 hours) at 11/3/2024 1643  Last data filed at 11/3/2024 1508  Gross per 24 hour   Intake 965 ml   Output 1905 ml   Net -940 ml       Labs:   Results from last 72 hours   Lab Units 11/03/24  0557 11/02/24  0607 11/01/24  0720   SODIUM mmol/L 132* 132* 128*   POTASSIUM mmol/L 4.0 3.7 4.3   CHLORIDE mmol/L 100 103 96*   CO2 mmol/L 24 23 22   BUN mg/dL 12 13 13   CREATININE mg/dL 0.70 0.73 1.15   GLUCOSE mg/dL 111* 115* 114*   CALCIUM mg/dL 8.4* 7.0* 7.7*   ANION GAP mmol/L 12 10 14   EGFR mL/min/1.73m*2 >90 >90 72   PHOSPHORUS " "mg/dL 3.5 2.2* 2.9      Results from last 72 hours   Lab Units 11/03/24  0557 11/02/24  0607 11/01/24  0720 10/31/24  1755   WBC AUTO x10*3/uL 20.3* 16.3* 10.5 9.9   HEMOGLOBIN g/dL 12.2* 10.8* 13.5 16.6   HEMATOCRIT % 34.3* 30.8* 38.9* 46.0   PLATELETS AUTO x10*3/uL 400 332 389 479*   NEUTROS PCT AUTO %  --   --   --  76.1   LYMPHS PCT AUTO %  --   --   --  16.3   MONOS PCT AUTO %  --   --   --  5.2   EOS PCT AUTO %  --   --   --  1.4      Lab Results   Component Value Date    CALCIUM 8.4 (L) 11/03/2024    PHOS 3.5 11/03/2024      No results found for: \"CRP\"    [unfilled]     Micro/ID:   No results found for the last 90 days.                   No lab exists for component: \"AGALPCRNB\"     Lab Results   Component Value Date    BLOODCULT No growth at 2 days 10/31/2024       Images    ECG 12 lead    Result Date: 11/1/2024   Poor data quality, interpretation may be adversely affected Normal sinus rhythm Possible Left atrial enlargement Cannot rule out Anterior infarct , age undetermined Abnormal ECG No previous ECGs available    ECG 12 lead    Result Date: 11/1/2024  Normal sinus rhythm Poor R-wave progression ; consider septal infarct, lead placement, or normal variant Abnormal ECG When compared with ECG of 31-OCT-2024 17:01, (unconfirmed) No significant change was found    XR chest 1 view    Result Date: 11/1/2024  Interpreted By:  Stanley Harris, STUDY: XR CHEST 1 VIEW;  11/1/2024 1:46 am   INDICATION: Signs/Symptoms:Ng tube.     COMPARISON: 10/31/2024   ACCESSION NUMBER(S): [id number]   ORDERING CLINICIAN: DIAMOND MIX   FINDINGS: - NG/OG tube termination: Gastric fundus   Low lung volumes results in basilar and perihilar bronchovascular crowding; however, there is no evidence of consolidation or effusion. There is bibasilar atelectasis.   No pneumothorax.   Gaseous distension of the colon with colonic interposition. There is scattered pneumoperitoneum most pronounced in the right upper quadrant.   "     NG/OG tube terminates over the gastric fundus.   Similar appearance of pneumoperitoneum in the right upper quadrant.   MACRO: None.   Signed by: Stanley Harris 11/1/2024 1:50 AM Dictation workstation:   [id number]    XR chest 1 view    Result Date: 10/31/2024  Interpreted By:  Nuno Guillory, STUDY: XR CHEST 1 VIEW;; 10/31/2024 7:57 pm   INDICATION: Signs/Symptoms:sob.   COMPARISON: 11/28/2014 and CT abdomen from 10/31/2024   ACCESSION NUMBER(S): [id number]   ORDERING CLINICIAN: MATTHIAS EARL   FINDINGS: Right subdiaphragmatic air lucency similar to previous CT scan of the abdomen, increased in size since prior suggestive of free intraperitoneal air. Mild left basilar opacity suggestive atelectasis/scarring or infiltrate. The remainder lung fields are clear bilaterally. Tortuous and possibly ectatic aorta with wall calcification. Aneurysm or dissection can not be excluded in the basis of this exam only.       1. Mild left basilar atelectasis/scarring or infiltrate. Recommend clinical correlation and follow-up with PA and lateral chest x-rays to document resolution. 2. Tortuous a possibly ectatic aorta with wall calcification but not significantly changed since previous exam accounting for difference in technique of imaging. If there is clinical concern for acute aortic pathology or pulmonary embolic disease, further evaluation with chest CT is recommended for better assessment. 3. Lucency within the right subdiaphragmatic space is abnormal and may reflect increased in size of intraperitoneal air such as related to previous surgery or bowel perforation. Recommend clinical correlation and follow-up.     SUPPLEMENTAL INFORMATION: Notifi message was left for MATTHIAS EARL regarding this exam by Dr. Guillory on 10/31/2024 at approximately 20:29 hours.     Critical Finding:  See findings. Notification was initiated on 10/31/2024 at 8:28 pm by  Nuno Guillory.  (**-OCF-**) Instructions:   Signed by: Nuno Guillory  10/31/2024 8:29 PM Dictation workstation:   [id number]    CT abdomen pelvis w IV contrast    Result Date: 10/31/2024  Interpreted By:  Alec Llamas, STUDY: CT ABDOMEN PELVIS W IV CONTRAST;  10/31/2024 6:52 pm   INDICATION: Signs/Symptoms:abdominal pain.   COMPARISON: None.   ACCESSION NUMBER(S): [id number]   ORDERING CLINICIAN: MATTHIAS EARL   TECHNIQUE: Contiguous axial images of the abdomen and pelvis were obtained after the intravenous administration of  contrast. Coronal and sagittal reformatted images were obtained from the axial images.   FINDINGS: There is limited evaluation of the lung bases. Bibasilar subsegmental atelectasis.   No evidence of liver mass. The gallbladder is present. No significant dilatation common bile duct.   The pancreas, spleen, and adrenal glands appear unremarkable.   Symmetric enhancement of the kidneys. A subcentimeter hypodensity in the upper pole the left kidney is too small to characterize. No hydronephrosis.   Atherosclerotic calcification of the aorta and abdominopelvic vasculature. There is 2.4 cm area of mild ectasia of the infrarenal abdominal aorta.   There is wall thickening and irregularity of the stomach. There is 4 cm x 1.4 cm irregular heterogeneous area of hypoattenuation in the wall of the greater curvature of the stomach on coronal image 35 of 119 concerning for underlying gastric ulcer. There is evidence of free abdominal air compatible with viscus perforation. There is also small amount of free fluid in the abdomen and pelvis. There is peripheral air in segment of the proximal transverse colon in the right abdomen.   Urinary bladder is underdistended and urinary bladder wall thickening is not excluded.   No acute fracture of the lumbar spine.       Wall thickening and irregularity of the stomach. There is 4 cm x 1.4 cm irregular heterogeneous area of hypoattenuation in the wall of the greater curvature of the stomach on coronal image 35 of 119 concerning for  underlying gastric ulcer. There is evidence of free abdominal air compatible with viscus perforation and the findings are concerning for perforated gastric ulcer. Other source of perforation of the bowel is also not excluded. Heterogeneous hyperdensity in the stomach lumen may correspond to hemorrhage. Small amount of free fluid in the abdomen and pelvis likely relating to the perforation.   Foci of peripheral air in segment of the proximal transverse colon the right abdomen and pneumatosis ischemia is not excluded.   Underdistention versus urinary bladder wall thickening; please correlate urinalysis to exclude cystitis.   MACRO: Alec Llamas discussed the significance and urgency of this critical finding by telephone with the emergency room physician on 10/31/2024 at 7:55 pm.  (**-RCF-**) Findings:  See findings.   Signed by: Alec Llamas 10/31/2024 7:55 PM Dictation workstation:   [id number]      Assessment and Plan    Taqueria Montano is a 64 y.o. male with a PMH of COPD, alcohol use disorder, tobacco use disorder, HTN, spinal stenosis of cervical region, neuropathy admitted on 11/1/2024 for management of perforated gastric ulcer s/p anibal patch repair.    ACUTE MEDICAL ISSUES:  #Perforated gastric ulcer s/p anibal patch repair  #Sepsis, resolved  #Elevated lactate, resolved  - Perforated gastric ulcer seen on CT abdomen/pelvis  - Underwent anibal patch repair of perforated gastric ulcer 11/1 - R-side abdominal drain placed  - Surgery following, appreciate recs  - Zosyn 4.5g q6  - Clarithromycin 500mg per NG q 12 hours per surgery recs  - Protonix 40mg daily   -Blood cultures negative at 2 days, continue to follow  - Telemetry monitoring   - Will discontinue home meloxicam at discharge  - Pain regiment: Tylenol 650 mg q6 hrs PRN mild pain, Dilaudid 0.2/0.4 mg q4 hrs PRN moderate/severe pain  - Surgery recommends keeping NG tube through the weekend and potentially doing gastrografin study on Monday      #Hyponatremia, improving  - Suspect 2/2 to beer potomania given hx  - Serum osm 273, urine osm and urine electrolytes  wnl      #Acute hypoxic respiratory failure:   - Difficult to assess whether 2/2 to COPD exacerbation vs pain/abdominal distention vs. CAP  - 4L NC - wean as tolerated  - Duo-nebs q6 scheduled + q2 PRN  - Solu-medrol 40mg IV  - Encourage incentive spirometry  - If this were 2/2 CAP - patient is on antibiotics that cover  - Sputum culture, urine legionella, and urine strep pneumo antigens pending       ADDRESSED/ RESOLVED MEDICAL ISSUES:  #Tortuous aorta  - Incidental finding on initial CXR - appears stable from previous imaginh  - Can consider CT chest for further workup as outpatient    CHRONIC MEDICAL ISSUES:  #Alcohol use disorder - CIWA protocol initiated, thiamine, folate  #Nicotine use disorder - Nicotine patch   #Spinal stenosis, DDD cervical region - continue home gabapentin 600mg TID, baclofen 10 mg TID PRN, meloxicam 15mg (holding) will discontinue this at discharge  #HTN - amlodipine 10mg  #Preventative - ASA 81mg daily (held)      Fluids: D5/NS 100cc/hr  Electrolytes: Replete PRN  Nutrition: NPO  Antimicrobials: Zosyn  DVT ppx: Lovenox and SCDs  GI ppx: Protonix  Catheter: None  Lines: PIV  Supplemental Oxygen: 4L NC  Emergency Contact: Extended Emergency Contact Information  Primary Emergency Contact: Paige Sharma  Mobile Phone: [phone]  Relation: Spouse  Preferred language: English   needed? No   Code: Full Code     Disposition: 64 year old male admitted for management of perforated gastric ulcer s/p anibal patch. Pending surgery recommendations for NG tube removal and advancement of diet - plan to trial clamping NG Monday with gastrografin study per surgery. Pending improvement of respiratory status.     Zulma Moore MD  Internal Medicine, PGY- 1  11/03/24 at 4:43 PM

## 2024-11-03 NOTE — CARE PLAN
The patient's goals for the shift include pain control and sleep    The clinical goals for the shift include pain will be controlled <5 with current regimen this shift

## 2024-11-03 NOTE — CARE PLAN
Uneventful night. Pain controlled with current regimen. Pt ambulated to bathroom this shift and had a BM. Pt has moderate to severe RESENDIZ. He remains on 5-6L O2, satting low 90s. Pt continues to progress towards meeting goals. He remains NPO except for ice chips and he continues to have his NG tube hooked up to low continuous suction. VSS. Will continue to monitor.

## 2024-11-04 ENCOUNTER — APPOINTMENT (OUTPATIENT)
Dept: RADIOLOGY | Facility: HOSPITAL | Age: 64
End: 2024-11-04
Payer: OTHER GOVERNMENT

## 2024-11-04 LAB
ALBUMIN SERPL BCP-MCNC: 3 G/DL (ref 3.4–5)
ANION GAP SERPL CALC-SCNC: 12 MMOL/L (ref 10–20)
BUN SERPL-MCNC: 9 MG/DL (ref 6–23)
CALCIUM SERPL-MCNC: 8.3 MG/DL (ref 8.6–10.3)
CHLORIDE SERPL-SCNC: 100 MMOL/L (ref 98–107)
CO2 SERPL-SCNC: 25 MMOL/L (ref 21–32)
CREAT SERPL-MCNC: 0.73 MG/DL (ref 0.5–1.3)
EGFRCR SERPLBLD CKD-EPI 2021: >90 ML/MIN/1.73M*2
ERYTHROCYTE [DISTWIDTH] IN BLOOD BY AUTOMATED COUNT: 13.8 % (ref 11.5–14.5)
GLUCOSE SERPL-MCNC: 94 MG/DL (ref 74–99)
HCT VFR BLD AUTO: 37.7 % (ref 41–52)
HGB BLD-MCNC: 13 G/DL (ref 13.5–17.5)
MAGNESIUM SERPL-MCNC: 1.74 MG/DL (ref 1.6–2.4)
MCH RBC QN AUTO: 33.7 PG (ref 26–34)
MCHC RBC AUTO-ENTMCNC: 34.5 G/DL (ref 32–36)
MCV RBC AUTO: 98 FL (ref 80–100)
NRBC BLD-RTO: 0 /100 WBCS (ref 0–0)
PHOSPHATE SERPL-MCNC: 3.1 MG/DL (ref 2.5–4.9)
PLATELET # BLD AUTO: 424 X10*3/UL (ref 150–450)
POTASSIUM SERPL-SCNC: 3.8 MMOL/L (ref 3.5–5.3)
RBC # BLD AUTO: 3.86 X10*6/UL (ref 4.5–5.9)
SODIUM SERPL-SCNC: 133 MMOL/L (ref 136–145)
WBC # BLD AUTO: 20.8 X10*3/UL (ref 4.4–11.3)

## 2024-11-04 PROCEDURE — 2500000001 HC RX 250 WO HCPCS SELF ADMINISTERED DRUGS (ALT 637 FOR MEDICARE OP)

## 2024-11-04 PROCEDURE — 94760 N-INVAS EAR/PLS OXIMETRY 1: CPT

## 2024-11-04 PROCEDURE — 36415 COLL VENOUS BLD VENIPUNCTURE: CPT

## 2024-11-04 PROCEDURE — 2500000002 HC RX 250 W HCPCS SELF ADMINISTERED DRUGS (ALT 637 FOR MEDICARE OP, ALT 636 FOR OP/ED): Performed by: SURGERY

## 2024-11-04 PROCEDURE — 2500000002 HC RX 250 W HCPCS SELF ADMINISTERED DRUGS (ALT 637 FOR MEDICARE OP, ALT 636 FOR OP/ED)

## 2024-11-04 PROCEDURE — 74240 X-RAY XM UPR GI TRC 1CNTRST: CPT

## 2024-11-04 PROCEDURE — S4991 NICOTINE PATCH NONLEGEND: HCPCS

## 2024-11-04 PROCEDURE — 2060000001 HC INTERMEDIATE ICU ROOM DAILY

## 2024-11-04 PROCEDURE — 80069 RENAL FUNCTION PANEL: CPT

## 2024-11-04 PROCEDURE — 2500000004 HC RX 250 GENERAL PHARMACY W/ HCPCS (ALT 636 FOR OP/ED)

## 2024-11-04 PROCEDURE — 2550000001 HC RX 255 CONTRASTS: Performed by: INTERNAL MEDICINE

## 2024-11-04 PROCEDURE — 83735 ASSAY OF MAGNESIUM: CPT

## 2024-11-04 PROCEDURE — 2500000002 HC RX 250 W HCPCS SELF ADMINISTERED DRUGS (ALT 637 FOR MEDICARE OP, ALT 636 FOR OP/ED): Performed by: INTERNAL MEDICINE

## 2024-11-04 PROCEDURE — 94640 AIRWAY INHALATION TREATMENT: CPT

## 2024-11-04 PROCEDURE — 74240 X-RAY XM UPR GI TRC 1CNTRST: CPT | Performed by: STUDENT IN AN ORGANIZED HEALTH CARE EDUCATION/TRAINING PROGRAM

## 2024-11-04 PROCEDURE — 2500000001 HC RX 250 WO HCPCS SELF ADMINISTERED DRUGS (ALT 637 FOR MEDICARE OP): Performed by: SURGERY

## 2024-11-04 PROCEDURE — 2500000004 HC RX 250 GENERAL PHARMACY W/ HCPCS (ALT 636 FOR OP/ED): Performed by: SURGERY

## 2024-11-04 PROCEDURE — 99233 SBSQ HOSP IP/OBS HIGH 50: CPT | Performed by: INTERNAL MEDICINE

## 2024-11-04 PROCEDURE — 85027 COMPLETE CBC AUTOMATED: CPT

## 2024-11-04 PROCEDURE — 2500000005 HC RX 250 GENERAL PHARMACY W/O HCPCS: Performed by: INTERNAL MEDICINE

## 2024-11-04 RX ORDER — IPRATROPIUM BROMIDE AND ALBUTEROL SULFATE 2.5; .5 MG/3ML; MG/3ML
3 SOLUTION RESPIRATORY (INHALATION) 3 TIMES DAILY
Status: DISCONTINUED | OUTPATIENT
Start: 2024-11-04 | End: 2024-11-07 | Stop reason: HOSPADM

## 2024-11-04 RX ORDER — GABAPENTIN 300 MG/1
600 CAPSULE ORAL 3 TIMES DAILY
Status: DISCONTINUED | OUTPATIENT
Start: 2024-11-04 | End: 2024-11-07 | Stop reason: HOSPADM

## 2024-11-04 RX ORDER — SUCRALFATE 1 G/1
1 TABLET ORAL EVERY 6 HOURS SCHEDULED
Status: DISCONTINUED | OUTPATIENT
Start: 2024-11-04 | End: 2024-11-06

## 2024-11-04 RX ORDER — DIATRIZOATE MEGLUMINE AND DIATRIZOATE SODIUM 660; 100 MG/ML; MG/ML
60 SOLUTION ORAL; RECTAL ONCE
Status: COMPLETED | OUTPATIENT
Start: 2024-11-04 | End: 2024-11-04

## 2024-11-04 RX ORDER — BISMUTH SUBSALICYLATE 262 MG/1
262 TABLET ORAL 4 TIMES DAILY
Status: DISCONTINUED | OUTPATIENT
Start: 2024-11-04 | End: 2024-11-07 | Stop reason: HOSPADM

## 2024-11-04 RX ORDER — FOLIC ACID 1 MG/1
1 TABLET ORAL DAILY
Status: DISCONTINUED | OUTPATIENT
Start: 2024-11-05 | End: 2024-11-07 | Stop reason: HOSPADM

## 2024-11-04 RX ORDER — TETRACYCLINE HYDROCHLORIDE 500 MG/1
500 CAPSULE ORAL 4 TIMES DAILY
Status: DISCONTINUED | OUTPATIENT
Start: 2024-11-04 | End: 2024-11-07 | Stop reason: HOSPADM

## 2024-11-04 RX ORDER — PANTOPRAZOLE SODIUM 40 MG/1
40 TABLET, DELAYED RELEASE ORAL 2 TIMES DAILY
Status: DISCONTINUED | OUTPATIENT
Start: 2024-11-04 | End: 2024-11-07 | Stop reason: HOSPADM

## 2024-11-04 RX ORDER — MAGNESIUM SULFATE HEPTAHYDRATE 40 MG/ML
2 INJECTION, SOLUTION INTRAVENOUS ONCE
Status: COMPLETED | OUTPATIENT
Start: 2024-11-04 | End: 2024-11-04

## 2024-11-04 RX ORDER — ACETAMINOPHEN 325 MG/1
975 TABLET ORAL EVERY 6 HOURS
Status: DISCONTINUED | OUTPATIENT
Start: 2024-11-04 | End: 2024-11-07 | Stop reason: HOSPADM

## 2024-11-04 RX ORDER — POLYETHYLENE GLYCOL 3350 17 G/17G
17 POWDER, FOR SOLUTION ORAL DAILY
Status: DISCONTINUED | OUTPATIENT
Start: 2024-11-05 | End: 2024-11-05

## 2024-11-04 RX ORDER — BACLOFEN 10 MG/1
10 TABLET ORAL 3 TIMES DAILY PRN
Status: DISCONTINUED | OUTPATIENT
Start: 2024-11-04 | End: 2024-11-07 | Stop reason: HOSPADM

## 2024-11-04 RX ORDER — IPRATROPIUM BROMIDE AND ALBUTEROL SULFATE 2.5; .5 MG/3ML; MG/3ML
3 SOLUTION RESPIRATORY (INHALATION) 3 TIMES DAILY
Status: DISCONTINUED | OUTPATIENT
Start: 2024-11-04 | End: 2024-11-04

## 2024-11-04 RX ORDER — POLYETHYLENE GLYCOL 3350 17 G/17G
17 POWDER, FOR SOLUTION ORAL DAILY
Status: DISCONTINUED | OUTPATIENT
Start: 2024-11-04 | End: 2024-11-04

## 2024-11-04 RX ADMIN — CLARITHROMYCIN 500 MG: 250 FOR SUSPENSION ORAL at 08:20

## 2024-11-04 RX ADMIN — ACETAMINOPHEN 975 MG: 325 TABLET ORAL at 17:43

## 2024-11-04 RX ADMIN — MAGNESIUM SULFATE HEPTAHYDRATE 2 G: 2 INJECTION, SOLUTION INTRAVENOUS at 08:16

## 2024-11-04 RX ADMIN — METHOCARBAMOL 500 MG: 100 INJECTION INTRAMUSCULAR; INTRAVENOUS at 02:01

## 2024-11-04 RX ADMIN — GABAPENTIN 600 MG: 300 CAPSULE ORAL at 21:01

## 2024-11-04 RX ADMIN — ACETAMINOPHEN 1000 MG: 10 INJECTION INTRAVENOUS at 05:54

## 2024-11-04 RX ADMIN — ACETAMINOPHEN 1000 MG: 10 INJECTION INTRAVENOUS at 12:30

## 2024-11-04 RX ADMIN — FLUTICASONE PROPIONATE 2 SPRAY: 50 SPRAY, METERED NASAL at 08:16

## 2024-11-04 RX ADMIN — PANTOPRAZOLE SODIUM 40 MG: 40 TABLET, DELAYED RELEASE ORAL at 21:01

## 2024-11-04 RX ADMIN — IPRATROPIUM BROMIDE AND ALBUTEROL SULFATE 3 ML: .5; 3 SOLUTION RESPIRATORY (INHALATION) at 08:01

## 2024-11-04 RX ADMIN — IPRATROPIUM BROMIDE AND ALBUTEROL SULFATE 3 ML: 2.5; .5 SOLUTION RESPIRATORY (INHALATION) at 19:55

## 2024-11-04 RX ADMIN — METHOCARBAMOL 500 MG: 100 INJECTION INTRAMUSCULAR; INTRAVENOUS at 10:10

## 2024-11-04 RX ADMIN — KETOROLAC TROMETHAMINE 15 MG: 15 INJECTION, SOLUTION INTRAMUSCULAR; INTRAVENOUS at 00:13

## 2024-11-04 RX ADMIN — KETOROLAC TROMETHAMINE 15 MG: 15 INJECTION, SOLUTION INTRAMUSCULAR; INTRAVENOUS at 12:30

## 2024-11-04 RX ADMIN — ENOXAPARIN SODIUM 40 MG: 40 INJECTION SUBCUTANEOUS at 08:16

## 2024-11-04 RX ADMIN — PANTOPRAZOLE SODIUM 40 MG: 40 INJECTION, POWDER, FOR SOLUTION INTRAVENOUS at 08:17

## 2024-11-04 RX ADMIN — FOLIC ACID 1 MG: 5 INJECTION, SOLUTION INTRAMUSCULAR; INTRAVENOUS; SUBCUTANEOUS at 08:17

## 2024-11-04 RX ADMIN — IPRATROPIUM BROMIDE AND ALBUTEROL SULFATE 3 ML: 2.5; .5 SOLUTION RESPIRATORY (INHALATION) at 15:21

## 2024-11-04 RX ADMIN — ACETAMINOPHEN 1000 MG: 10 INJECTION INTRAVENOUS at 00:13

## 2024-11-04 RX ADMIN — KETOROLAC TROMETHAMINE 15 MG: 15 INJECTION, SOLUTION INTRAMUSCULAR; INTRAVENOUS at 05:54

## 2024-11-04 RX ADMIN — SUCRALFATE 1 G: 1 SUSPENSION ORAL at 00:13

## 2024-11-04 RX ADMIN — BISMUTH SUBSALICYLATE 262 MG: 262 TABLET, CHEWABLE ORAL at 21:01

## 2024-11-04 RX ADMIN — GABAPENTIN 600 MG: 250 SOLUTION ORAL at 14:28

## 2024-11-04 RX ADMIN — AMLODIPINE BESYLATE 10 MG: 5 TABLET ORAL at 08:17

## 2024-11-04 RX ADMIN — PIPERACILLIN SODIUM AND TAZOBACTAM SODIUM 4.5 G: 4; .5 INJECTION, SOLUTION INTRAVENOUS at 21:01

## 2024-11-04 RX ADMIN — PIPERACILLIN SODIUM AND TAZOBACTAM SODIUM 4.5 G: 4; .5 INJECTION, SOLUTION INTRAVENOUS at 08:17

## 2024-11-04 RX ADMIN — POTASSIUM CHLORIDE, DEXTROSE MONOHYDRATE AND SODIUM CHLORIDE 100 ML/HR: 150; 5; 900 INJECTION, SOLUTION INTRAVENOUS at 15:33

## 2024-11-04 RX ADMIN — SUCRALFATE 1 G: 1 SUSPENSION ORAL at 12:30

## 2024-11-04 RX ADMIN — TETRACYCLINE HYDROCHLORIDE 500 MG: 500 CAPSULE ORAL at 21:01

## 2024-11-04 RX ADMIN — DIATRIZOATE MEGLUMINE AND DIATRIZOATE SODIUM 60 ML: 660; 100 LIQUID ORAL; RECTAL at 11:27

## 2024-11-04 RX ADMIN — BISMUTH SUBSALICYLATE 262 MG: 262 TABLET, CHEWABLE ORAL at 17:03

## 2024-11-04 RX ADMIN — PIPERACILLIN SODIUM AND TAZOBACTAM SODIUM 4.5 G: 4; .5 INJECTION, SOLUTION INTRAVENOUS at 14:28

## 2024-11-04 RX ADMIN — PIPERACILLIN SODIUM AND TAZOBACTAM SODIUM 4.5 G: 4; .5 INJECTION, SOLUTION INTRAVENOUS at 02:02

## 2024-11-04 RX ADMIN — Medication 4 L/MIN: at 19:59

## 2024-11-04 RX ADMIN — Medication 4 L/MIN: at 08:01

## 2024-11-04 RX ADMIN — NICOTINE 1 PATCH: 21 PATCH, EXTENDED RELEASE TRANSDERMAL at 08:19

## 2024-11-04 RX ADMIN — TETRACYCLINE HYDROCHLORIDE 500 MG: 500 CAPSULE ORAL at 17:03

## 2024-11-04 RX ADMIN — SUCRALFATE 1 G: 1 TABLET ORAL at 17:03

## 2024-11-04 RX ADMIN — POTASSIUM CHLORIDE, DEXTROSE MONOHYDRATE AND SODIUM CHLORIDE 100 ML/HR: 150; 5; 900 INJECTION, SOLUTION INTRAVENOUS at 02:02

## 2024-11-04 RX ADMIN — HYDROMORPHONE HYDROCHLORIDE 0.4 MG: 1 INJECTION, SOLUTION INTRAMUSCULAR; INTRAVENOUS; SUBCUTANEOUS at 22:37

## 2024-11-04 ASSESSMENT — PAIN SCALES - GENERAL
PAINLEVEL_OUTOF10: 9
PAINLEVEL_OUTOF10: 3
PAINLEVEL_OUTOF10: 1
PAINLEVEL_OUTOF10: 4

## 2024-11-04 ASSESSMENT — COGNITIVE AND FUNCTIONAL STATUS - GENERAL
MOVING TO AND FROM BED TO CHAIR: A LITTLE
STANDING UP FROM CHAIR USING ARMS: A LITTLE
WALKING IN HOSPITAL ROOM: A LITTLE
TURNING FROM BACK TO SIDE WHILE IN FLAT BAD: A LITTLE
TOILETING: A LITTLE
HELP NEEDED FOR BATHING: A LITTLE
MOBILITY SCORE: 19
DAILY ACTIVITIY SCORE: 21
DRESSING REGULAR LOWER BODY CLOTHING: A LITTLE
CLIMB 3 TO 5 STEPS WITH RAILING: A LITTLE

## 2024-11-04 ASSESSMENT — PAIN - FUNCTIONAL ASSESSMENT
PAIN_FUNCTIONAL_ASSESSMENT: 0-10
PAIN_FUNCTIONAL_ASSESSMENT: 0-10

## 2024-11-04 NOTE — CARE PLAN
The patient's goals for the shift include  Tolerating removal of NG  tube    The clinical goals for the shift include pt. will remain safe this shift

## 2024-11-04 NOTE — PROGRESS NOTES
Internal Medicine - Daily Progress Note   Hospital Day: 4       Name:Taqueria Montano, AGE: 64 y.o., GENDER: male, MRN: [id number], ROOM: 239/239-A   CODE STATUS: Full Code  Attending Physician: João Dubois DO  Resident: Neva To DO        Chief Complaint   Perforated gastric ulcer       Subjective    Taqueria Montano is a 64 y.o. year old male patient on Hospital Day: 4    Overnight events NG tube fell out at 2am last night    Patient seen and examined at bedside this morning. States he is feeling okay overall.  Denies other acute complaints or concerns. Denies chest pain, SOB, abdominal pain.    Meds    Scheduled medications  acetaminophen, 975 mg, oral, q6h  amLODIPine, 10 mg, oral, Daily  [Held by provider] aspirin, 81 mg, oral, Daily  bismuth subsalicylate, 262 mg, oral, 4x daily  enoxaparin, 40 mg, subcutaneous, q24h  fluticasone, 2 spray, Each Nostril, Daily  [START ON 11/5/2024] folic acid, 1 mg, oral, Daily  gabapentin, 600 mg, oral, TID  ipratropium-albuteroL, 3 mL, nebulization, TID  nicotine, 1 patch, transdermal, Daily  pantoprazole, 40 mg, oral, BID  piperacillin-tazobactam, 4.5 g, intravenous, q6h  polyethylene glycol, 17 g, oral, Daily  sucralfate, 1 g, oral, q6h DM  tetracycline, 500 mg, oral, 4x daily      Continuous medications  potassium chloride-D5-0.9%NaCl, 100 mL/hr, Last Rate: 100 mL/hr (11/04/24 1533)      PRN medications  PRN medications: albuterol, [Held by provider] albuterol, baclofen, benzocaine-menthol, HYDROmorphone, HYDROmorphone, oxygen     Objective    Physical Exam  Constitutional:       General: He is not in acute distress.  HENT:      Head: Normocephalic and atraumatic.      Mouth/Throat:      Pharynx: Oropharynx is clear.   Cardiovascular:      Rate and Rhythm: Normal rate and regular rhythm.      Heart sounds: Murmur heard.   Pulmonary:      Effort: Pulmonary effort is normal. No respiratory distress.      Breath sounds: Normal breath sounds. No stridor. No wheezing or  "rales.   Abdominal:      General: There is no distension.      Palpations: Abdomen is soft.      Tenderness: There is abdominal tenderness. There is no guarding.      Comments: Patient with closed laparoscopic incisions that are clean and dry  Has R-sided abdominal drain in place draining red fluid   Musculoskeletal:      Right lower leg: No edema.      Left lower leg: No edema.   Skin:     General: Skin is warm and dry.   Neurological:      General: No focal deficit present.      Mental Status: He is alert.   Psychiatric:         Mood and Affect: Mood normal.         Behavior: Behavior normal.        Visit Vitals  /84   Pulse 104   Temp 36.8 °C (98.3 °F)   Resp (!) 28   Ht 1.753 m (5' 9.02\")   Wt 73.7 kg (162 lb 7.7 oz)   SpO2 96%   BMI 23.98 kg/m²   Smoking Status Every Day   BSA 1.89 m²        Intake/Output Summary (Last 24 hours) at 11/4/2024 1550  Last data filed at 11/4/2024 1345  Gross per 24 hour   Intake 3036 ml   Output 47 ml   Net 2989 ml       Labs:   Results from last 72 hours   Lab Units 11/04/24  0542 11/03/24  0557 11/02/24  0607   SODIUM mmol/L 133* 132* 132*   POTASSIUM mmol/L 3.8 4.0 3.7   CHLORIDE mmol/L 100 100 103   CO2 mmol/L 25 24 23   BUN mg/dL 9 12 13   CREATININE mg/dL 0.73 0.70 0.73   GLUCOSE mg/dL 94 111* 115*   CALCIUM mg/dL 8.3* 8.4* 7.0*   ANION GAP mmol/L 12 12 10   EGFR mL/min/1.73m*2 >90 >90 >90   PHOSPHORUS mg/dL 3.1 3.5 2.2*      Results from last 72 hours   Lab Units 11/04/24  0542 11/03/24  0557 11/02/24  0607   WBC AUTO x10*3/uL 20.8* 20.3* 16.3*   HEMOGLOBIN g/dL 13.0* 12.2* 10.8*   HEMATOCRIT % 37.7* 34.3* 30.8*   PLATELETS AUTO x10*3/uL 424 400 332      Lab Results   Component Value Date    CALCIUM 8.3 (L) 11/04/2024    PHOS 3.1 11/04/2024      No results found for: \"CRP\"    [unfilled]     Micro/ID:   No results found for the last 90 days.                   No lab exists for component: \"AGALPCRNB\"     Lab Results   Component Value Date    BLOODCULT No growth at 3 " days 10/31/2024       Images    ECG 12 lead    Result Date: 11/1/2024   Poor data quality, interpretation may be adversely affected Normal sinus rhythm Possible Left atrial enlargement Cannot rule out Anterior infarct , age undetermined Abnormal ECG No previous ECGs available    ECG 12 lead    Result Date: 11/1/2024  Normal sinus rhythm Poor R-wave progression ; consider septal infarct, lead placement, or normal variant Abnormal ECG When compared with ECG of 31-OCT-2024 17:01, (unconfirmed) No significant change was found    XR chest 1 view    Result Date: 11/1/2024  Interpreted By:  Stanley Harris, STUDY: XR CHEST 1 VIEW;  11/1/2024 1:46 am   INDICATION: Signs/Symptoms:Ng tube.     COMPARISON: 10/31/2024   ACCESSION NUMBER(S): [id number]   ORDERING CLINICIAN: DIAMOND MIX   FINDINGS: - NG/OG tube termination: Gastric fundus   Low lung volumes results in basilar and perihilar bronchovascular crowding; however, there is no evidence of consolidation or effusion. There is bibasilar atelectasis.   No pneumothorax.   Gaseous distension of the colon with colonic interposition. There is scattered pneumoperitoneum most pronounced in the right upper quadrant.       NG/OG tube terminates over the gastric fundus.   Similar appearance of pneumoperitoneum in the right upper quadrant.   MACRO: None.   Signed by: Stanley Harris 11/1/2024 1:50 AM Dictation workstation:   [id number]    XR chest 1 view    Result Date: 10/31/2024  Interpreted By:  Nuno Guillory, STUDY: XR CHEST 1 VIEW;; 10/31/2024 7:57 pm   INDICATION: Signs/Symptoms:sob.   COMPARISON: 11/28/2014 and CT abdomen from 10/31/2024   ACCESSION NUMBER(S): [id number]   ORDERING CLINICIAN: MATTHIAS EARL   FINDINGS: Right subdiaphragmatic air lucency similar to previous CT scan of the abdomen, increased in size since prior suggestive of free intraperitoneal air. Mild left basilar opacity suggestive atelectasis/scarring or infiltrate. The remainder lung fields are  clear bilaterally. Tortuous and possibly ectatic aorta with wall calcification. Aneurysm or dissection can not be excluded in the basis of this exam only.       1. Mild left basilar atelectasis/scarring or infiltrate. Recommend clinical correlation and follow-up with PA and lateral chest x-rays to document resolution. 2. Tortuous a possibly ectatic aorta with wall calcification but not significantly changed since previous exam accounting for difference in technique of imaging. If there is clinical concern for acute aortic pathology or pulmonary embolic disease, further evaluation with chest CT is recommended for better assessment. 3. Lucency within the right subdiaphragmatic space is abnormal and may reflect increased in size of intraperitoneal air such as related to previous surgery or bowel perforation. Recommend clinical correlation and follow-up.     SUPPLEMENTAL INFORMATION: Notifi message was left for MATTHIAS EARL regarding this exam by Dr. Guillory on 10/31/2024 at approximately 20:29 hours.     Critical Finding:  See findings. Notification was initiated on 10/31/2024 at 8:28 pm by  Nuno Guillory.  (**-OCF-**) Instructions:   Signed by: Nuno Guillory 10/31/2024 8:29 PM Dictation workstation:   [id number]    CT abdomen pelvis w IV contrast    Result Date: 10/31/2024  Interpreted By:  Alec Llamas, STUDY: CT ABDOMEN PELVIS W IV CONTRAST;  10/31/2024 6:52 pm   INDICATION: Signs/Symptoms:abdominal pain.   COMPARISON: None.   ACCESSION NUMBER(S): [id number]   ORDERING CLINICIAN: MATTHIAS EARL   TECHNIQUE: Contiguous axial images of the abdomen and pelvis were obtained after the intravenous administration of  contrast. Coronal and sagittal reformatted images were obtained from the axial images.   FINDINGS: There is limited evaluation of the lung bases. Bibasilar subsegmental atelectasis.   No evidence of liver mass. The gallbladder is present. No significant dilatation common bile duct.   The pancreas, spleen,  and adrenal glands appear unremarkable.   Symmetric enhancement of the kidneys. A subcentimeter hypodensity in the upper pole the left kidney is too small to characterize. No hydronephrosis.   Atherosclerotic calcification of the aorta and abdominopelvic vasculature. There is 2.4 cm area of mild ectasia of the infrarenal abdominal aorta.   There is wall thickening and irregularity of the stomach. There is 4 cm x 1.4 cm irregular heterogeneous area of hypoattenuation in the wall of the greater curvature of the stomach on coronal image 35 of 119 concerning for underlying gastric ulcer. There is evidence of free abdominal air compatible with viscus perforation. There is also small amount of free fluid in the abdomen and pelvis. There is peripheral air in segment of the proximal transverse colon in the right abdomen.   Urinary bladder is underdistended and urinary bladder wall thickening is not excluded.   No acute fracture of the lumbar spine.       Wall thickening and irregularity of the stomach. There is 4 cm x 1.4 cm irregular heterogeneous area of hypoattenuation in the wall of the greater curvature of the stomach on coronal image 35 of 119 concerning for underlying gastric ulcer. There is evidence of free abdominal air compatible with viscus perforation and the findings are concerning for perforated gastric ulcer. Other source of perforation of the bowel is also not excluded. Heterogeneous hyperdensity in the stomach lumen may correspond to hemorrhage. Small amount of free fluid in the abdomen and pelvis likely relating to the perforation.   Foci of peripheral air in segment of the proximal transverse colon the right abdomen and pneumatosis ischemia is not excluded.   Underdistention versus urinary bladder wall thickening; please correlate urinalysis to exclude cystitis.   MACRO: Alec Llamas discussed the significance and urgency of this critical finding by telephone with the emergency room physician on  10/31/2024 at 7:55 pm.  (**-RCF-**) Findings:  See findings.   Signed by: Alec Llamas 10/31/2024 7:55 PM Dictation workstation:   [id number]      Assessment and Plan    Taqueria Montano is a 64 y.o. male with a PMH of COPD, alcohol use disorder, tobacco use disorder, HTN, spinal stenosis of cervical region, neuropathy admitted on 11/1/2024 for management of perforated gastric ulcer s/p anibal patch repair.    ACUTE MEDICAL ISSUES:  #Perforated gastric ulcer s/p anibal patch repair  #Sepsis, resolved  #Elevated lactate, resolved  - Perforated gastric ulcer seen on CT abdomen/pelvis  - Underwent anibal patch repair of perforated gastric ulcer 11/1 - R-side abdominal drain placed  - Telemetry monitoring   - Surgery following, appreciate recs  - discontinued clarithromycin 500mg   - Started empirc H. Pylori treatment: bismuth subsalicylate, tetracycline, continue protonix 40mg twice daily  - continue zosyn 4.5g q6   -Blood cultures negative at 2 days, continue to follow  - Discontinued home meloxicam and toradol  - Pain regiment: Tylenol 975 mg q6 hrs PRN mild pain, Dilaudid 0.2/0.4 mg q4 hrs PRN moderate/severe pain  - FL upper GI w KUB ordered today per surgery recs    #Hyponatremia, improving  - Suspect 2/2 to beer potomania given hx  - Serum osm 273, urine osm and urine electrolytes  wnl      #Acute hypoxic respiratory failure:   - Difficult to assess whether 2/2 to COPD exacerbation vs pain/abdominal distention vs. CAP  - 4L NC - wean as tolerated  - Duo-nebs q6 scheduled + q2 PRN  - Solu-medrol 40mg IV  - Encourage incentive spirometry  - If this were 2/2 CAP - patient is on antibiotics that cover  - Sputum culture, urine legionella, and urine strep pneumo antigens pending     ADDRESSED/ RESOLVED MEDICAL ISSUES:  #Tortuous aorta  - Incidental finding on initial CXR - appears stable from previous imaginh  - Can consider CT chest for further workup as outpatient    CHRONIC MEDICAL ISSUES:  #Alcohol use disorder -  CIWA protocol discontinued. Has not needed ativan during admission  #Nicotine use disorder - Nicotine patch   #Spinal stenosis, DDD cervical region - continue home gabapentin 600mg TID, baclofen 10 mg TID PRN, meloxicam 15mg (holding) will discontinue this at discharge  #HTN - amlodipine 10mg  #Preventative - ASA 81mg daily (held)      Fluids: D5/NS 100cc/hr  Electrolytes: Replete PRN  Nutrition: clear liquid  Antimicrobials: Zosyn  DVT ppx: Lovenox and SCDs  GI ppx: Protonix  Catheter: None  Lines: PIV  Supplemental Oxygen: 4L NC  Emergency Contact: Extended Emergency Contact Information  Primary Emergency Contact: Paige Sharma  Mobile Phone: [phone]  Relation: Spouse  Preferred language: English   needed? No   Code: Full Code     Disposition: 64 year old male admitted for management of perforated gastric ulcer s/p anibal patch. Pending surgery recommendations for NG tube removal and advancement of diet, waiting surgery recs prior to discharge     Neva To DO   PGY1

## 2024-11-04 NOTE — PROGRESS NOTES
"Taqueria Montano is a 64 y.o. male on day 3 of admission presenting with Perforated ulcer (Multi).    Subjective   Unsteady on his feet       Objective vss    Physical Examabd distended , tympanitic  Drain serrous    Last Recorded Vitals  Blood pressure 149/74, pulse 80, temperature 36.9 °C (98.4 °F), temperature source Temporal, resp. rate 19, height 1.753 m (5' 9.02\"), weight 73.7 kg (162 lb 7.7 oz), SpO2 98%.  Intake/Output last 3 Shifts:  I/O last 3 completed shifts:  In: 3111.7 (42.2 mL/kg) [P.O.:120; I.V.:345 (4.7 mL/kg); NG/GT:185; IV Piggyback:2461.7]  Out: 1727 (23.4 mL/kg) [Urine:925 (0.3 mL/kg/hr); Emesis/NG output:700; Drains:102]  Weight: 73.7 kg     Relevant Results                 Wbc up likely due to streroids             Assessment/Plan   Assessment & Plan  Perforated ulcer (Multi)    Primary hypertension    Plan UGI today       I spent 10 minutes in the professional and overall care of this patient.      Beverly Burton MD      "

## 2024-11-04 NOTE — PROGRESS NOTES
11/04/24 1420   Discharge Planning   Living Arrangements Spouse/significant other   Support Systems Spouse/significant other   Assistance Needed A&OX3, independent in ADLs, ambulates without assistive devices, drives, and wears no O2, CPAP or Bipap at baseline. No active HC agency.   Type of Residence Private residence   Number of Stairs to Enter Residence 5   Number of Stairs Within Residence 0   Do you have animals or pets at home? Yes   Type of Animals or Pets 4 dogs   Home or Post Acute Services None   Expected Discharge Disposition Home

## 2024-11-05 LAB
ALBUMIN SERPL BCP-MCNC: 2.9 G/DL (ref 3.4–5)
ANION GAP SERPL CALC-SCNC: 12 MMOL/L (ref 10–20)
BACTERIA BLD CULT: NORMAL
BACTERIA BLD CULT: NORMAL
BUN SERPL-MCNC: 7 MG/DL (ref 6–23)
CALCIUM SERPL-MCNC: 8.1 MG/DL (ref 8.6–10.3)
CHLORIDE SERPL-SCNC: 100 MMOL/L (ref 98–107)
CO2 SERPL-SCNC: 26 MMOL/L (ref 21–32)
CREAT SERPL-MCNC: 0.7 MG/DL (ref 0.5–1.3)
EGFRCR SERPLBLD CKD-EPI 2021: >90 ML/MIN/1.73M*2
ERYTHROCYTE [DISTWIDTH] IN BLOOD BY AUTOMATED COUNT: 13.5 % (ref 11.5–14.5)
GLUCOSE SERPL-MCNC: 93 MG/DL (ref 74–99)
HCT VFR BLD AUTO: 37.8 % (ref 41–52)
HGB BLD-MCNC: 13.3 G/DL (ref 13.5–17.5)
MAGNESIUM SERPL-MCNC: 1.75 MG/DL (ref 1.6–2.4)
MCH RBC QN AUTO: 33.3 PG (ref 26–34)
MCHC RBC AUTO-ENTMCNC: 35.2 G/DL (ref 32–36)
MCV RBC AUTO: 95 FL (ref 80–100)
NRBC BLD-RTO: 0 /100 WBCS (ref 0–0)
PHOSPHATE SERPL-MCNC: 2.8 MG/DL (ref 2.5–4.9)
PLATELET # BLD AUTO: 422 X10*3/UL (ref 150–450)
POTASSIUM SERPL-SCNC: 3.3 MMOL/L (ref 3.5–5.3)
RBC # BLD AUTO: 3.99 X10*6/UL (ref 4.5–5.9)
SODIUM SERPL-SCNC: 135 MMOL/L (ref 136–145)
WBC # BLD AUTO: 20.6 X10*3/UL (ref 4.4–11.3)

## 2024-11-05 PROCEDURE — 36415 COLL VENOUS BLD VENIPUNCTURE: CPT

## 2024-11-05 PROCEDURE — 2500000002 HC RX 250 W HCPCS SELF ADMINISTERED DRUGS (ALT 637 FOR MEDICARE OP, ALT 636 FOR OP/ED)

## 2024-11-05 PROCEDURE — 85027 COMPLETE CBC AUTOMATED: CPT

## 2024-11-05 PROCEDURE — 2500000004 HC RX 250 GENERAL PHARMACY W/ HCPCS (ALT 636 FOR OP/ED)

## 2024-11-05 PROCEDURE — 2500000005 HC RX 250 GENERAL PHARMACY W/O HCPCS: Performed by: INTERNAL MEDICINE

## 2024-11-05 PROCEDURE — 80069 RENAL FUNCTION PANEL: CPT

## 2024-11-05 PROCEDURE — 94760 N-INVAS EAR/PLS OXIMETRY 1: CPT

## 2024-11-05 PROCEDURE — 2500000001 HC RX 250 WO HCPCS SELF ADMINISTERED DRUGS (ALT 637 FOR MEDICARE OP)

## 2024-11-05 PROCEDURE — 2500000002 HC RX 250 W HCPCS SELF ADMINISTERED DRUGS (ALT 637 FOR MEDICARE OP, ALT 636 FOR OP/ED): Performed by: INTERNAL MEDICINE

## 2024-11-05 PROCEDURE — 94640 AIRWAY INHALATION TREATMENT: CPT

## 2024-11-05 PROCEDURE — 97161 PT EVAL LOW COMPLEX 20 MIN: CPT | Mod: GP

## 2024-11-05 PROCEDURE — 83735 ASSAY OF MAGNESIUM: CPT

## 2024-11-05 PROCEDURE — 9420000001 HC RT PATIENT EDUCATION 5 MIN

## 2024-11-05 PROCEDURE — 2500000004 HC RX 250 GENERAL PHARMACY W/ HCPCS (ALT 636 FOR OP/ED): Performed by: SURGERY

## 2024-11-05 PROCEDURE — S4991 NICOTINE PATCH NONLEGEND: HCPCS

## 2024-11-05 PROCEDURE — 2060000001 HC INTERMEDIATE ICU ROOM DAILY

## 2024-11-05 PROCEDURE — 99232 SBSQ HOSP IP/OBS MODERATE 35: CPT

## 2024-11-05 RX ORDER — OXYCODONE HYDROCHLORIDE 5 MG/1
5 TABLET ORAL EVERY 6 HOURS PRN
Status: DISCONTINUED | OUTPATIENT
Start: 2024-11-05 | End: 2024-11-07 | Stop reason: HOSPADM

## 2024-11-05 RX ORDER — CETIRIZINE HYDROCHLORIDE 10 MG/1
5 TABLET ORAL DAILY
Status: DISCONTINUED | OUTPATIENT
Start: 2024-11-05 | End: 2024-11-07 | Stop reason: HOSPADM

## 2024-11-05 RX ORDER — POTASSIUM CHLORIDE 20 MEQ/1
40 TABLET, EXTENDED RELEASE ORAL ONCE
Status: COMPLETED | OUTPATIENT
Start: 2024-11-05 | End: 2024-11-05

## 2024-11-05 RX ORDER — LANOLIN ALCOHOL/MO/W.PET/CERES
400 CREAM (GRAM) TOPICAL DAILY
Status: DISCONTINUED | OUTPATIENT
Start: 2024-11-05 | End: 2024-11-06

## 2024-11-05 RX ADMIN — GABAPENTIN 600 MG: 300 CAPSULE ORAL at 13:54

## 2024-11-05 RX ADMIN — FLUTICASONE PROPIONATE 2 SPRAY: 50 SPRAY, METERED NASAL at 05:56

## 2024-11-05 RX ADMIN — TETRACYCLINE HYDROCHLORIDE 500 MG: 500 CAPSULE ORAL at 11:59

## 2024-11-05 RX ADMIN — POTASSIUM CHLORIDE 40 MEQ: 1500 TABLET, EXTENDED RELEASE ORAL at 08:52

## 2024-11-05 RX ADMIN — BISMUTH SUBSALICYLATE 262 MG: 262 TABLET, CHEWABLE ORAL at 20:19

## 2024-11-05 RX ADMIN — Medication 2 L/MIN: at 14:28

## 2024-11-05 RX ADMIN — SALINE NASAL SPRAY 1 SPRAY: 1.5 SOLUTION NASAL at 11:04

## 2024-11-05 RX ADMIN — SUCRALFATE 1 G: 1 TABLET ORAL at 11:54

## 2024-11-05 RX ADMIN — FOLIC ACID 1 MG: 1 TABLET ORAL at 08:31

## 2024-11-05 RX ADMIN — GABAPENTIN 600 MG: 300 CAPSULE ORAL at 20:19

## 2024-11-05 RX ADMIN — AMLODIPINE BESYLATE 10 MG: 5 TABLET ORAL at 08:31

## 2024-11-05 RX ADMIN — SUCRALFATE 1 G: 1 TABLET ORAL at 05:52

## 2024-11-05 RX ADMIN — SUCRALFATE 1 G: 1 TABLET ORAL at 17:59

## 2024-11-05 RX ADMIN — ACETAMINOPHEN 975 MG: 325 TABLET ORAL at 17:59

## 2024-11-05 RX ADMIN — BISMUTH SUBSALICYLATE 262 MG: 262 TABLET, CHEWABLE ORAL at 11:59

## 2024-11-05 RX ADMIN — IPRATROPIUM BROMIDE AND ALBUTEROL SULFATE 3 ML: 2.5; .5 SOLUTION RESPIRATORY (INHALATION) at 09:08

## 2024-11-05 RX ADMIN — SALINE NASAL SPRAY 1 SPRAY: 1.5 SOLUTION NASAL at 15:25

## 2024-11-05 RX ADMIN — IPRATROPIUM BROMIDE AND ALBUTEROL SULFATE 3 ML: 2.5; .5 SOLUTION RESPIRATORY (INHALATION) at 14:28

## 2024-11-05 RX ADMIN — IPRATROPIUM BROMIDE AND ALBUTEROL SULFATE 3 ML: 2.5; .5 SOLUTION RESPIRATORY (INHALATION) at 20:08

## 2024-11-05 RX ADMIN — TETRACYCLINE HYDROCHLORIDE 500 MG: 500 CAPSULE ORAL at 17:59

## 2024-11-05 RX ADMIN — CETIRIZINE HYDROCHLORIDE 5 MG: 10 TABLET, FILM COATED ORAL at 13:53

## 2024-11-05 RX ADMIN — PIPERACILLIN SODIUM AND TAZOBACTAM SODIUM 4.5 G: 4; .5 INJECTION, SOLUTION INTRAVENOUS at 13:55

## 2024-11-05 RX ADMIN — TETRACYCLINE HYDROCHLORIDE 500 MG: 500 CAPSULE ORAL at 20:19

## 2024-11-05 RX ADMIN — PANTOPRAZOLE SODIUM 40 MG: 40 TABLET, DELAYED RELEASE ORAL at 20:19

## 2024-11-05 RX ADMIN — PIPERACILLIN SODIUM AND TAZOBACTAM SODIUM 4.5 G: 4; .5 INJECTION, SOLUTION INTRAVENOUS at 02:06

## 2024-11-05 RX ADMIN — ACETAMINOPHEN 975 MG: 325 TABLET ORAL at 05:51

## 2024-11-05 RX ADMIN — ACETAMINOPHEN 975 MG: 325 TABLET ORAL at 11:54

## 2024-11-05 RX ADMIN — PIPERACILLIN SODIUM AND TAZOBACTAM SODIUM 4.5 G: 4; .5 INJECTION, SOLUTION INTRAVENOUS at 20:19

## 2024-11-05 RX ADMIN — NICOTINE 1 PATCH: 21 PATCH, EXTENDED RELEASE TRANSDERMAL at 08:35

## 2024-11-05 RX ADMIN — ENOXAPARIN SODIUM 40 MG: 40 INJECTION SUBCUTANEOUS at 08:32

## 2024-11-05 RX ADMIN — BISMUTH SUBSALICYLATE 262 MG: 262 TABLET, CHEWABLE ORAL at 05:52

## 2024-11-05 RX ADMIN — PANTOPRAZOLE SODIUM 40 MG: 40 TABLET, DELAYED RELEASE ORAL at 08:32

## 2024-11-05 RX ADMIN — PIPERACILLIN SODIUM AND TAZOBACTAM SODIUM 4.5 G: 4; .5 INJECTION, SOLUTION INTRAVENOUS at 08:34

## 2024-11-05 RX ADMIN — Medication 400 MG: at 08:52

## 2024-11-05 RX ADMIN — Medication 4 L/MIN: at 09:08

## 2024-11-05 RX ADMIN — Medication 2 L/MIN: at 20:08

## 2024-11-05 RX ADMIN — POTASSIUM CHLORIDE, DEXTROSE MONOHYDRATE AND SODIUM CHLORIDE 100 ML/HR: 150; 5; 900 INJECTION, SOLUTION INTRAVENOUS at 02:49

## 2024-11-05 RX ADMIN — GABAPENTIN 600 MG: 300 CAPSULE ORAL at 08:31

## 2024-11-05 RX ADMIN — BISMUTH SUBSALICYLATE 262 MG: 262 TABLET, CHEWABLE ORAL at 17:40

## 2024-11-05 RX ADMIN — TETRACYCLINE HYDROCHLORIDE 500 MG: 500 CAPSULE ORAL at 05:52

## 2024-11-05 ASSESSMENT — COGNITIVE AND FUNCTIONAL STATUS - GENERAL
WALKING IN HOSPITAL ROOM: A LITTLE
MOVING TO AND FROM BED TO CHAIR: A LITTLE
MOVING TO AND FROM BED TO CHAIR: A LITTLE
STANDING UP FROM CHAIR USING ARMS: A LITTLE
DAILY ACTIVITIY SCORE: 22
WALKING IN HOSPITAL ROOM: A LITTLE
CLIMB 3 TO 5 STEPS WITH RAILING: A LITTLE
MOBILITY SCORE: 21
STANDING UP FROM CHAIR USING ARMS: A LITTLE
DRESSING REGULAR LOWER BODY CLOTHING: A LITTLE
MOVING TO AND FROM BED TO CHAIR: A LITTLE
MOBILITY SCORE: 18
WALKING IN HOSPITAL ROOM: A LITTLE
CLIMB 3 TO 5 STEPS WITH RAILING: A LITTLE
MOBILITY SCORE: 20
TURNING FROM BACK TO SIDE WHILE IN FLAT BAD: A LITTLE
DAILY ACTIVITIY SCORE: 22
CLIMB 3 TO 5 STEPS WITH RAILING: A LITTLE
MOVING FROM LYING ON BACK TO SITTING ON SIDE OF FLAT BED WITH BEDRAILS: A LITTLE
DRESSING REGULAR LOWER BODY CLOTHING: A LITTLE
HELP NEEDED FOR BATHING: A LITTLE
HELP NEEDED FOR BATHING: A LITTLE

## 2024-11-05 ASSESSMENT — PAIN - FUNCTIONAL ASSESSMENT
PAIN_FUNCTIONAL_ASSESSMENT: 0-10
PAIN_FUNCTIONAL_ASSESSMENT: 0-10

## 2024-11-05 ASSESSMENT — PAIN SCALES - GENERAL
PAINLEVEL_OUTOF10: 0 - NO PAIN
PAINLEVEL_OUTOF10: 2
PAINLEVEL_OUTOF10: 3

## 2024-11-05 NOTE — PROGRESS NOTES
11/05/24 1412   Discharge Planning   Expected Discharge Disposition Home  (PT recommending low intensity. Discussed HHC with the pt, pt declined due to his dog.)

## 2024-11-05 NOTE — PROGRESS NOTES
Physical Therapy    Physical Therapy Evaluation    Patient Name: Taqueria Montano  MRN: [id number]  Department: [address]  Room: [address] [place] [address]  Today's Date: 11/5/2024   Time Calculation  Start Time: 1138  Stop Time: 1158  Time Calculation (min): 20 min    Assessment/Plan   PT Assessment  PT Assessment Results: Decreased strength, Decreased mobility, Pain (drconditioning)  Rehab Prognosis: Good  Barriers to Discharge: none  Evaluation/Treatment Tolerance: Patient limited by fatigue  Medical Staff Made Aware: Yes  Strengths: Ability to acquire knowledge  Barriers to Participation: Comorbidities  End of Session Communication: Bedside nurse  End of Session Patient Position: Up in chair, Alarm off, not on at start of session (RN OK with no alarm as Pt is using his call light appropriately)  IP OR SWING BED PT PLAN  Inpatient or Swing Bed: Inpatient  PT Plan  Treatment/Interventions: Bed mobility, Transfer training, Gait training, Strengthening, Therapeutic exercise  PT Plan: Ongoing PT  PT Frequency: 3 times per week  PT Discharge Recommendations: Low intensity level of continued care  Equipment Recommended upon Discharge:  (least restrictive device)  PT Recommended Transfer Status: Independent  PT - OK to Discharge:  (Per PT POC)    Subjective   General Visit Information:  General  Reason for Referral: 65 yo male admitted 2' to perorated gastric ulcer, s/p laparoscopic sx, hyponatremia  Referred By: Dr. LANI Burton  Past Medical History Relevant to Rehab: COPD, ETOH use disorder, tobacco use dis order, HTN, spinal stenosis (cervical)  Family/Caregiver Present: No  Prior to Session Communication: Bedside nurse  Patient Position Received: Up in chair, Alarm off, not on at start of session  General Comment: Pt is letahrgic but agreeable to work wit PT. Pt is easily SB and is exhibiting labored breathing  Home Living:  Home Living  Type of Home: House  Lives With: Spouse  Home Adaptive Equipment: Cane  Home Layout: One level  Home  Access: Stairs to enter with rails  Entrance Stairs-Rails: Both  Entrance Stairs-Number of Steps: 4  Bathroom Shower/Tub: Walk-in shower  Bathroom Toilet: Standard  Bathroom Equipment: Shower chair with back  Prior Level of Function:  Prior Function Per Pt/Caregiver Report  Level of [place]: Independent with ADLs and functional transfers, Independent with homemaking with ambulation (no device)  Ambulatory Assistance: Independent  Precautions:  Precautions  Medical Precautions: Fall precautions (4L O2, IV)     Vital Signs (Past 2hrs)                 Objective   Pain:  Pain Assessment  Pain Assessment: 0-10  0-10 (Numeric) Pain Score: 3  Pain Type: Surgical pain  Pain Location: Abdomen  Cognition:  Cognition  Overall Cognitive Status: Within Functional Limits  Orientation Level: Oriented X4    General Assessments:  General Observation  General Observation: Pt fatigues easily, after ambulation Pt desated to 87% on 4L O2               Activity Tolerance  Endurance: Tolerates 10 - 20 min exercise with multiple rests         Strength  Strength Comments: B UE and LE are 4+ of 5  Static Sitting Balance  Static Sitting-Balance Support: Bilateral upper extremity supported, Feet supported  Static Sitting-Level of Assistance: Independent  Functional Assessments:  Bed Mobility  Bed Mobility: No    Transfers  Transfer: Yes  Transfer 1  Transfer From 1: Chair with arms to  Transfer to 1: Stand  Technique 1: Sit to stand, Stand to sit  Transfer Device 1:  (wheeled walker)  Transfer Level of Assistance 1: Close supervision    Ambulation/Gait Training  Ambulation/Gait Training Performed: Yes  Ambulation/Gait Training 1  Surface 1: Level tile  Device 1: Rolling walker  Assistance 1: Close supervision  Quality of Gait 1: Decreased step length  Comments/Distance (ft) 1: 6 feet forward/backward x 2    Stairs  Stairs: No  Extremity/Trunk Assessments:  RUE   RUE : Within Functional Limits  LUE   LUE: Within Functional Limits  RLE    RLE : Within Functional Limits  LLE   LLE : Within Functional Limits  Outcome Measures:  [facility] Basic Mobility  Turning from your back to your side while in a flat bed without using bedrails: A little  Moving from lying on your back to sitting on the side of a flat bed without using bedrails: A little  Moving to and from bed to chair (including a wheelchair): A little  Standing up from a chair using your arms (e.g. wheelchair or bedside chair): A little  To walk in hospital room: A little  Climbing 3-5 steps with railing: A little  Basic Mobility - Total Score: 18    Encounter Problems       Encounter Problems (Active)       Mobility       STG - Patient will ambulate 150 feet MOD I with least restrictive device (Progressing)       Start:  11/05/24    Expected End:  11/19/24            STG - Patient will ascend and descend four to six stairs MOD I with B rails (Progressing)       Start:  11/05/24    Expected End:  11/19/24               PT Transfers       STG - Patient to transfer to and from sit to supine independently (Progressing)       Start:  11/05/24    Expected End:  11/19/24            STG - Patient will transfer sit to and from stand MOD I with least restrictive device (Progressing)       Start:  11/05/24    Expected End:  11/19/24               Pain - Adult              Education Documentation  No documentation found.  Education Comments  No comments found.

## 2024-11-05 NOTE — CONSULTS
Patients name:            Room #  Do you have any home inhalers?    Yes   Do you get relief when using it?     Yes   [employer] education and have them teach back   If using home inhaler do you rinse your mouth? No   Any barriers? No   When were you diagnosed with COPD? 5/6 yrs ago   Any previous PFTs? No     Do you have a pulmonary Dr.? No      Pulmonary cards given  Do you currently smoke or vape or have you ever?   Yes   Quit date or planning to quit? Trying   How long have you smoked for? Sine he was 15 Yrs old   PPD?  2 PPD   Smoking education given and class information given   Get orders for nicotine supplement = Yes   Do you have [facility]  [facility]   Name:  Phone number:  Date of last appt: June 2024  Do you have any home O2 or CPAP/BiPAP?  No  Settings for CPAP/BiPAP:  What company?                How much O2 at home?   Last time 6mwt was done?   Educate on acceptable O2 levels and the importance of monitoring O2 at home. Complete home O2 evaluation if needed.  This RT to see patient for COPD consult. The patient was given a COPD booklet with educational materials regarding pulmonary issues. Smoking cessation education reviewed, documentation given. Smoking history?

## 2024-11-05 NOTE — PROGRESS NOTES
Internal Medicine - Daily Progress Note   Hospital Day: 5       Name:Taqueria Montano, AGE: 64 y.o., GENDER: male, MRN: [id number], ROOM: 239/239-A   CODE STATUS: Full Code  Attending Physician: Milan Escobar DO  Resident: Neva To DO        Chief Complaint   Perforated gastric ulcer       Subjective    Taqueria Montano is a 64 y.o. year old male patient on Hospital Day: 5    Overnight events none    Patient seen and examined at bedside this morning. States he is feeling okay overall.  Denies chest pain, SOB, abdominal pain. He endorses some nasal congestion but is otherwise feeling okay.     Meds    Scheduled medications  acetaminophen, 975 mg, oral, q6h  amLODIPine, 10 mg, oral, Daily  [Held by provider] aspirin, 81 mg, oral, Daily  bismuth subsalicylate, 262 mg, oral, 4x daily  cetirizine, 5 mg, oral, Daily  enoxaparin, 40 mg, subcutaneous, q24h  fluticasone, 2 spray, Each Nostril, Daily  folic acid, 1 mg, oral, Daily  gabapentin, 600 mg, oral, TID  ipratropium-albuteroL, 3 mL, nebulization, TID  magnesium oxide, 400 mg, oral, Daily  nicotine, 1 patch, transdermal, Daily  pantoprazole, 40 mg, oral, BID  piperacillin-tazobactam, 4.5 g, intravenous, q6h  sucralfate, 1 g, oral, q6h DM  tetracycline, 500 mg, oral, 4x daily      Continuous medications       PRN medications  PRN medications: albuterol, [Held by provider] albuterol, baclofen, benzocaine-menthol, HYDROmorphone, HYDROmorphone, oxygen, sodium chloride     Objective    Physical Exam  Constitutional:       General: He is not in acute distress.  HENT:      Head: Normocephalic and atraumatic.      Mouth/Throat:      Pharynx: Oropharynx is clear.   Cardiovascular:      Rate and Rhythm: Normal rate and regular rhythm.      Heart sounds: Murmur heard.   Pulmonary:      Effort: Pulmonary effort is normal. No respiratory distress.      Breath sounds: Normal breath sounds. No stridor. No wheezing or rales.   Abdominal:      General: There is no distension.       "Palpations: Abdomen is soft.      Tenderness: There is abdominal tenderness. There is no guarding.      Comments: Patient with closed laparoscopic incisions that are clean and dry  Has R-sided abdominal drain in place draining red fluid   Musculoskeletal:      Right lower leg: No edema.      Left lower leg: No edema.   Skin:     General: Skin is warm and dry.   Neurological:      General: No focal deficit present.      Mental Status: He is alert.   Psychiatric:         Mood and Affect: Mood normal.         Behavior: Behavior normal.        Visit Vitals  /69 (BP Location: Left arm, Patient Position: Lying)   Pulse 88   Temp 36.9 °C (98.5 °F) (Temporal)   Resp 20   Ht 1.753 m (5' 9.02\")   Wt 73.7 kg (162 lb 7.7 oz)   SpO2 93%   BMI 23.98 kg/m²   Smoking Status Every Day   BSA 1.89 m²        Intake/Output Summary (Last 24 hours) at 11/5/2024 1416  Last data filed at 11/5/2024 1311  Gross per 24 hour   Intake 1286.66 ml   Output 45 ml   Net 1241.66 ml       Labs:   Results from last 72 hours   Lab Units 11/05/24  0553 11/04/24  0542 11/03/24  0557   SODIUM mmol/L 135* 133* 132*   POTASSIUM mmol/L 3.3* 3.8 4.0   CHLORIDE mmol/L 100 100 100   CO2 mmol/L 26 25 24   BUN mg/dL 7 9 12   CREATININE mg/dL 0.70 0.73 0.70   GLUCOSE mg/dL 93 94 111*   CALCIUM mg/dL 8.1* 8.3* 8.4*   ANION GAP mmol/L 12 12 12   EGFR mL/min/1.73m*2 >90 >90 >90   PHOSPHORUS mg/dL 2.8 3.1 3.5      Results from last 72 hours   Lab Units 11/05/24  0553 11/04/24  0542 11/03/24  0557   WBC AUTO x10*3/uL 20.6* 20.8* 20.3*   HEMOGLOBIN g/dL 13.3* 13.0* 12.2*   HEMATOCRIT % 37.8* 37.7* 34.3*   PLATELETS AUTO x10*3/uL 422 424 400      Lab Results   Component Value Date    CALCIUM 8.1 (L) 11/05/2024    PHOS 2.8 11/05/2024      No results found for: \"CRP\"    [unfilled]     Micro/ID:   No results found for the last 90 days.                   No lab exists for component: \"AGALPCRNB\"     Lab Results   Component Value Date    BLOODCULT No growth at 4 days " -  FINAL REPORT 10/31/2024       Images    ECG 12 lead    Result Date: 11/1/2024   Poor data quality, interpretation may be adversely affected Normal sinus rhythm Possible Left atrial enlargement Cannot rule out Anterior infarct , age undetermined Abnormal ECG No previous ECGs available    ECG 12 lead    Result Date: 11/1/2024  Normal sinus rhythm Poor R-wave progression ; consider septal infarct, lead placement, or normal variant Abnormal ECG When compared with ECG of 31-OCT-2024 17:01, (unconfirmed) No significant change was found    XR chest 1 view    Result Date: 11/1/2024  Interpreted By:  Stanley Harris, STUDY: XR CHEST 1 VIEW;  11/1/2024 1:46 am   INDICATION: Signs/Symptoms:Ng tube.     COMPARISON: 10/31/2024   ACCESSION NUMBER(S): [id number]   ORDERING CLINICIAN: DIAMOND MIX   FINDINGS: - NG/OG tube termination: Gastric fundus   Low lung volumes results in basilar and perihilar bronchovascular crowding; however, there is no evidence of consolidation or effusion. There is bibasilar atelectasis.   No pneumothorax.   Gaseous distension of the colon with colonic interposition. There is scattered pneumoperitoneum most pronounced in the right upper quadrant.       NG/OG tube terminates over the gastric fundus.   Similar appearance of pneumoperitoneum in the right upper quadrant.   MACRO: None.   Signed by: Stanley Harris 11/1/2024 1:50 AM Dictation workstation:   [id number]    XR chest 1 view    Result Date: 10/31/2024  Interpreted By:  Nuno Guillory, STUDY: XR CHEST 1 VIEW;; 10/31/2024 7:57 pm   INDICATION: Signs/Symptoms:sob.   COMPARISON: 11/28/2014 and CT abdomen from 10/31/2024   ACCESSION NUMBER(S): [id number]   ORDERING CLINICIAN: MATTHIAS EARL   FINDINGS: Right subdiaphragmatic air lucency similar to previous CT scan of the abdomen, increased in size since prior suggestive of free intraperitoneal air. Mild left basilar opacity suggestive atelectasis/scarring or infiltrate. The remainder lung  fields are clear bilaterally. Tortuous and possibly ectatic aorta with wall calcification. Aneurysm or dissection can not be excluded in the basis of this exam only.       1. Mild left basilar atelectasis/scarring or infiltrate. Recommend clinical correlation and follow-up with PA and lateral chest x-rays to document resolution. 2. Tortuous a possibly ectatic aorta with wall calcification but not significantly changed since previous exam accounting for difference in technique of imaging. If there is clinical concern for acute aortic pathology or pulmonary embolic disease, further evaluation with chest CT is recommended for better assessment. 3. Lucency within the right subdiaphragmatic space is abnormal and may reflect increased in size of intraperitoneal air such as related to previous surgery or bowel perforation. Recommend clinical correlation and follow-up.     SUPPLEMENTAL INFORMATION: Notifi message was left for MATTHIAS EARL regarding this exam by Dr. Guillory on 10/31/2024 at approximately 20:29 hours.     Critical Finding:  See findings. Notification was initiated on 10/31/2024 at 8:28 pm by  Nuno Guillory.  (**-OCF-**) Instructions:   Signed by: Nuno Guillory 10/31/2024 8:29 PM Dictation workstation:   [id number]    CT abdomen pelvis w IV contrast    Result Date: 10/31/2024  Interpreted By:  Alec Llamas, STUDY: CT ABDOMEN PELVIS W IV CONTRAST;  10/31/2024 6:52 pm   INDICATION: Signs/Symptoms:abdominal pain.   COMPARISON: None.   ACCESSION NUMBER(S): [id number]   ORDERING CLINICIAN: MATTHIAS EARL   TECHNIQUE: Contiguous axial images of the abdomen and pelvis were obtained after the intravenous administration of  contrast. Coronal and sagittal reformatted images were obtained from the axial images.   FINDINGS: There is limited evaluation of the lung bases. Bibasilar subsegmental atelectasis.   No evidence of liver mass. The gallbladder is present. No significant dilatation common bile duct.   The  pancreas, spleen, and adrenal glands appear unremarkable.   Symmetric enhancement of the kidneys. A subcentimeter hypodensity in the upper pole the left kidney is too small to characterize. No hydronephrosis.   Atherosclerotic calcification of the aorta and abdominopelvic vasculature. There is 2.4 cm area of mild ectasia of the infrarenal abdominal aorta.   There is wall thickening and irregularity of the stomach. There is 4 cm x 1.4 cm irregular heterogeneous area of hypoattenuation in the wall of the greater curvature of the stomach on coronal image 35 of 119 concerning for underlying gastric ulcer. There is evidence of free abdominal air compatible with viscus perforation. There is also small amount of free fluid in the abdomen and pelvis. There is peripheral air in segment of the proximal transverse colon in the right abdomen.   Urinary bladder is underdistended and urinary bladder wall thickening is not excluded.   No acute fracture of the lumbar spine.       Wall thickening and irregularity of the stomach. There is 4 cm x 1.4 cm irregular heterogeneous area of hypoattenuation in the wall of the greater curvature of the stomach on coronal image 35 of 119 concerning for underlying gastric ulcer. There is evidence of free abdominal air compatible with viscus perforation and the findings are concerning for perforated gastric ulcer. Other source of perforation of the bowel is also not excluded. Heterogeneous hyperdensity in the stomach lumen may correspond to hemorrhage. Small amount of free fluid in the abdomen and pelvis likely relating to the perforation.   Foci of peripheral air in segment of the proximal transverse colon the right abdomen and pneumatosis ischemia is not excluded.   Underdistention versus urinary bladder wall thickening; please correlate urinalysis to exclude cystitis.   MACRO: Alec Llamas discussed the significance and urgency of this critical finding by telephone with the emergency room  physician on 10/31/2024 at 7:55 pm.  (**-RCF-**) Findings:  See findings.   Signed by: Alec Llamas 10/31/2024 7:55 PM Dictation workstation:   [id number]      Assessment and Plan    Taqueria Montano is a 64 y.o. male with a PMH of COPD, alcohol use disorder, tobacco use disorder, HTN, spinal stenosis of cervical region, neuropathy admitted on 11/1/2024 for management of perforated gastric ulcer s/p anibal patch repair.    ACUTE MEDICAL ISSUES:  #Perforated gastric ulcer s/p ainbal patch repair  #Sepsis, resolved  #Elevated lactate, resolved  - Perforated gastric ulcer seen on CT abdomen/pelvis  - Underwent anibal patch repair of perforated gastric ulcer 11/1 - R-side abdominal drain placed  - Telemetry monitoring   - Surgery following, appreciate recs  - discontinued clarithromycin 500mg   - Started empirc H. Pylori treatment: bismuth subsalicylate, tetracycline, continue protonix 40mg twice daily (11/4 - )  - continue zosyn 4.5g q6 (10-31 - )    -Blood cultures negative at 2 days, continue to follow  - Discontinued home meloxicam and toradol on 11/4  - Pain regiment: Tylenol 975 mg q6 hrs PRN mild pain, Dilaudid 0.2/0.4 mg q4 hrs PRN moderate/severe pain  - FL upper GI w KUB showed no leakage or obstruction  - Per surgery, advanced diet to full liquid diet    #Hyponatremia, improving  - Suspect 2/2 to beer potomania given hx  - Serum osm 273, urine osm and urine electrolytes  wnl      #Acute hypoxic respiratory failure:   - Difficult to assess whether 2/2 to COPD exacerbation vs pain/abdominal distention vs. CAP  - Duo-nebs q6 scheduled + q2 PRN  - Solu-medrol 40mg IV  - Encourage incentive spirometry  - If this were 2/2 CAP - patient is on antibiotics that cover  - Sputum culture, urine legionella, and urine strep pneumo antigens pending   - Patient was able to wean down to 2L today at 96%    ADDRESSED/ RESOLVED MEDICAL ISSUES:  #Tortuous aorta  - Incidental finding on initial CXR - appears stable from previous  darrius  - Can consider CT chest for further workup as outpatient    CHRONIC MEDICAL ISSUES:  #Alcohol use disorder - CIWA protocol discontinued. Has not needed ativan during admission  #Nicotine use disorder - Nicotine patch   #Spinal stenosis, DDD cervical region - continue home gabapentin 600mg TID, baclofen 10 mg TID PRN, meloxicam 15mg (holding) will discontinue this at discharge  #HTN - amlodipine 10mg  #Preventative - ASA 81mg daily (held)      Fluids: D5/NS 100cc/hr  Electrolytes: Replete PRN  Nutrition: clear liquid  Antimicrobials: Zosyn  DVT ppx: Lovenox and SCDs  GI ppx: Protonix  Catheter: None  Lines: PIV  Supplemental Oxygen: 4L NC  Emergency Contact: Extended Emergency Contact Information  Primary Emergency Contact: Paige Sharma  Mobile Phone: [phone]  Relation: Spouse  Preferred language: English   needed? No   Code: Full Code     Disposition: 64 year old male admitted for management of perforated gastric ulcer s/p anibal patch. Surgery recs advancement of diet to full liquid, trying to wean off O2, CTM on advanced diet    Neva To DO   PGY1

## 2024-11-05 NOTE — PROGRESS NOTES
"Taqueria Montano is a 64 y.o. male on day 4 of admission presenting with Perforated ulcer (Multi).    Subjective   SOB       Objective Afeb, todd po    Physical ExamAbd soft NT    Last Recorded Vitals  Blood pressure 147/69, pulse 88, temperature 36.9 °C (98.5 °F), temperature source Temporal, resp. rate 20, height 1.753 m (5' 9.02\"), weight 73.7 kg (162 lb 7.7 oz), SpO2 93%.  Intake/Output last 3 Shifts:  I/O last 3 completed shifts:  In: 4202.7 (57 mL/kg) [P.O.:120; I.V.:51 (0.7 mL/kg); NG/GT:65; IV Piggyback:3966.7]  Out: 77 (1 mL/kg) [Drains:77]  Weight: 73.7 kg     Relevant Results               Ugi ok yesterday               Assessment/Plan   Assessment & Plan  Perforated ulcer (Multi)    Primary hypertension    Adv to fulls         I spent 10 minutes in the professional and overall care of this patient.      Beverly Burton MD      "

## 2024-11-05 NOTE — CARE PLAN
The patient's goals for the shift include  To be able to breathe through nose    The clinical goals for the shift include patient will not complain of nausea this shift

## 2024-11-06 PROBLEM — I10 PRIMARY HYPERTENSION: Status: RESOLVED | Noted: 2024-11-01 | Resolved: 2024-11-06

## 2024-11-06 PROBLEM — K27.5 PERFORATED ULCER (MULTI): Status: RESOLVED | Noted: 2024-11-01 | Resolved: 2024-11-06

## 2024-11-06 LAB
ALBUMIN SERPL BCP-MCNC: 2.7 G/DL (ref 3.4–5)
ANION GAP SERPL CALC-SCNC: 14 MMOL/L (ref 10–20)
BUN SERPL-MCNC: 10 MG/DL (ref 6–23)
CALCIUM SERPL-MCNC: 8 MG/DL (ref 8.6–10.3)
CHLORIDE SERPL-SCNC: 101 MMOL/L (ref 98–107)
CO2 SERPL-SCNC: 23 MMOL/L (ref 21–32)
CREAT SERPL-MCNC: 0.63 MG/DL (ref 0.5–1.3)
EGFRCR SERPLBLD CKD-EPI 2021: >90 ML/MIN/1.73M*2
ERYTHROCYTE [DISTWIDTH] IN BLOOD BY AUTOMATED COUNT: 13.5 % (ref 11.5–14.5)
GLUCOSE SERPL-MCNC: 85 MG/DL (ref 74–99)
HCT VFR BLD AUTO: 37.4 % (ref 41–52)
HGB BLD-MCNC: 13.5 G/DL (ref 13.5–17.5)
MAGNESIUM SERPL-MCNC: 1.8 MG/DL (ref 1.6–2.4)
MCH RBC QN AUTO: 33.6 PG (ref 26–34)
MCHC RBC AUTO-ENTMCNC: 36.1 G/DL (ref 32–36)
MCV RBC AUTO: 93 FL (ref 80–100)
NRBC BLD-RTO: 0 /100 WBCS (ref 0–0)
PHOSPHATE SERPL-MCNC: 4.3 MG/DL (ref 2.5–4.9)
PLATELET # BLD AUTO: 430 X10*3/UL (ref 150–450)
POTASSIUM SERPL-SCNC: 3.2 MMOL/L (ref 3.5–5.3)
RBC # BLD AUTO: 4.02 X10*6/UL (ref 4.5–5.9)
SODIUM SERPL-SCNC: 135 MMOL/L (ref 136–145)
WBC # BLD AUTO: 15 X10*3/UL (ref 4.4–11.3)

## 2024-11-06 PROCEDURE — 2500000005 HC RX 250 GENERAL PHARMACY W/O HCPCS: Performed by: NURSE PRACTITIONER

## 2024-11-06 PROCEDURE — 2500000002 HC RX 250 W HCPCS SELF ADMINISTERED DRUGS (ALT 637 FOR MEDICARE OP, ALT 636 FOR OP/ED): Performed by: INTERNAL MEDICINE

## 2024-11-06 PROCEDURE — 99232 SBSQ HOSP IP/OBS MODERATE 35: CPT | Performed by: NURSE PRACTITIONER

## 2024-11-06 PROCEDURE — 2060000001 HC INTERMEDIATE ICU ROOM DAILY

## 2024-11-06 PROCEDURE — 2500000004 HC RX 250 GENERAL PHARMACY W/ HCPCS (ALT 636 FOR OP/ED): Performed by: NURSE PRACTITIONER

## 2024-11-06 PROCEDURE — 80069 RENAL FUNCTION PANEL: CPT

## 2024-11-06 PROCEDURE — 94640 AIRWAY INHALATION TREATMENT: CPT

## 2024-11-06 PROCEDURE — 2500000004 HC RX 250 GENERAL PHARMACY W/ HCPCS (ALT 636 FOR OP/ED)

## 2024-11-06 PROCEDURE — 2500000001 HC RX 250 WO HCPCS SELF ADMINISTERED DRUGS (ALT 637 FOR MEDICARE OP)

## 2024-11-06 PROCEDURE — 2500000005 HC RX 250 GENERAL PHARMACY W/O HCPCS: Performed by: INTERNAL MEDICINE

## 2024-11-06 PROCEDURE — 2500000001 HC RX 250 WO HCPCS SELF ADMINISTERED DRUGS (ALT 637 FOR MEDICARE OP): Performed by: SURGERY

## 2024-11-06 PROCEDURE — 36415 COLL VENOUS BLD VENIPUNCTURE: CPT

## 2024-11-06 PROCEDURE — 2500000002 HC RX 250 W HCPCS SELF ADMINISTERED DRUGS (ALT 637 FOR MEDICARE OP, ALT 636 FOR OP/ED)

## 2024-11-06 PROCEDURE — 99232 SBSQ HOSP IP/OBS MODERATE 35: CPT

## 2024-11-06 PROCEDURE — 2500000002 HC RX 250 W HCPCS SELF ADMINISTERED DRUGS (ALT 637 FOR MEDICARE OP, ALT 636 FOR OP/ED): Performed by: NURSE PRACTITIONER

## 2024-11-06 PROCEDURE — S4991 NICOTINE PATCH NONLEGEND: HCPCS

## 2024-11-06 PROCEDURE — 2500000001 HC RX 250 WO HCPCS SELF ADMINISTERED DRUGS (ALT 637 FOR MEDICARE OP): Performed by: NURSE PRACTITIONER

## 2024-11-06 PROCEDURE — 94760 N-INVAS EAR/PLS OXIMETRY 1: CPT

## 2024-11-06 PROCEDURE — 85027 COMPLETE CBC AUTOMATED: CPT

## 2024-11-06 PROCEDURE — 83735 ASSAY OF MAGNESIUM: CPT

## 2024-11-06 PROCEDURE — 94761 N-INVAS EAR/PLS OXIMETRY MLT: CPT

## 2024-11-06 RX ORDER — PETROLATUM 420 MG/G
1 OINTMENT TOPICAL
Qty: 56 G | Refills: 0 | Status: SHIPPED | OUTPATIENT
Start: 2024-11-06

## 2024-11-06 RX ORDER — SUCRALFATE 1 G/10ML
1 SUSPENSION ORAL EVERY 6 HOURS SCHEDULED
Status: DISCONTINUED | OUTPATIENT
Start: 2024-11-06 | End: 2024-11-07 | Stop reason: HOSPADM

## 2024-11-06 RX ORDER — BISMUTH SUBSALICYLATE 262 MG/1
262 TABLET ORAL 4 TIMES DAILY
Qty: 30 TABLET | Refills: 0 | Status: CANCELLED | OUTPATIENT
Start: 2024-11-06 | End: 2024-11-19

## 2024-11-06 RX ORDER — BISMUTH SUBSALICYLATE 262 MG/1
262 TABLET ORAL 4 TIMES DAILY
Qty: 30 TABLET | Refills: 0 | Status: SHIPPED | OUTPATIENT
Start: 2024-11-06 | End: 2024-11-19

## 2024-11-06 RX ORDER — IBUPROFEN 200 MG
1 TABLET ORAL DAILY
Qty: 30 PATCH | Refills: 1 | Status: SHIPPED | OUTPATIENT
Start: 2024-11-07 | End: 2024-11-06 | Stop reason: HOSPADM

## 2024-11-06 RX ORDER — POTASSIUM CHLORIDE 14.9 MG/ML
20 INJECTION INTRAVENOUS
Status: DISPENSED | OUTPATIENT
Start: 2024-11-06 | End: 2024-11-06

## 2024-11-06 RX ORDER — SUCRALFATE 1 G/10ML
1 SUSPENSION ORAL EVERY 6 HOURS SCHEDULED
Qty: 560 ML | Refills: 0 | Status: CANCELLED | OUTPATIENT
Start: 2024-11-06 | End: 2024-11-20

## 2024-11-06 RX ORDER — TETRACYCLINE HYDROCHLORIDE 500 MG/1
500 CAPSULE ORAL 4 TIMES DAILY
Qty: 49 CAPSULE | Refills: 0 | Status: CANCELLED | OUTPATIENT
Start: 2024-11-06 | End: 2024-11-19

## 2024-11-06 RX ORDER — METRONIDAZOLE 500 MG/1
500 TABLET ORAL 4 TIMES DAILY
Qty: 36 TABLET | Refills: 0 | Status: SHIPPED | OUTPATIENT
Start: 2024-11-06 | End: 2024-11-07

## 2024-11-06 RX ORDER — HYDROCORTISONE 1 %
CREAM (GRAM) TOPICAL 2 TIMES DAILY
Status: DISCONTINUED | OUTPATIENT
Start: 2024-11-06 | End: 2024-11-07 | Stop reason: HOSPADM

## 2024-11-06 RX ORDER — PETROLATUM 420 MG/G
OINTMENT TOPICAL
Status: DISCONTINUED | OUTPATIENT
Start: 2024-11-06 | End: 2024-11-07 | Stop reason: HOSPADM

## 2024-11-06 RX ORDER — PANTOPRAZOLE SODIUM 40 MG/1
40 TABLET, DELAYED RELEASE ORAL 2 TIMES DAILY
Qty: 60 TABLET | Refills: 1 | Status: SHIPPED | OUTPATIENT
Start: 2024-11-06 | End: 2025-01-05

## 2024-11-06 RX ORDER — TETRACYCLINE HYDROCHLORIDE 500 MG/1
500 CAPSULE ORAL 4 TIMES DAILY
Qty: 49 CAPSULE | Refills: 0 | Status: SHIPPED | OUTPATIENT
Start: 2024-11-06 | End: 2024-11-19

## 2024-11-06 RX ORDER — FOLIC ACID 1 MG/1
1 TABLET ORAL DAILY
Qty: 30 TABLET | Refills: 1 | Status: SHIPPED | OUTPATIENT
Start: 2024-11-07 | End: 2025-01-06

## 2024-11-06 RX ORDER — LANOLIN ALCOHOL/MO/W.PET/CERES
400 CREAM (GRAM) TOPICAL DAILY
Status: COMPLETED | OUTPATIENT
Start: 2024-11-06 | End: 2024-11-06

## 2024-11-06 RX ORDER — SUCRALFATE 1 G/10ML
1 SUSPENSION ORAL EVERY 6 HOURS SCHEDULED
Qty: 560 ML | Refills: 0 | Status: SHIPPED | OUTPATIENT
Start: 2024-11-06 | End: 2024-11-20

## 2024-11-06 RX ORDER — CETIRIZINE HYDROCHLORIDE 5 MG/1
5 TABLET ORAL DAILY
Qty: 7 TABLET | Refills: 0 | Status: SHIPPED | OUTPATIENT
Start: 2024-11-07 | End: 2024-11-14

## 2024-11-06 RX ORDER — PANTOPRAZOLE SODIUM 40 MG/1
40 TABLET, DELAYED RELEASE ORAL 2 TIMES DAILY
Qty: 112 TABLET | Refills: 0 | Status: CANCELLED | OUTPATIENT
Start: 2024-11-06 | End: 2025-01-01

## 2024-11-06 RX ORDER — HYDROCORTISONE 1 %
CREAM (GRAM) TOPICAL 2 TIMES DAILY
Qty: 1 PACKET | Refills: 0 | Status: SHIPPED | OUTPATIENT
Start: 2024-11-06

## 2024-11-06 RX ORDER — POTASSIUM CHLORIDE 1.5 G/1.58G
40 POWDER, FOR SOLUTION ORAL ONCE
Status: COMPLETED | OUTPATIENT
Start: 2024-11-06 | End: 2024-11-06

## 2024-11-06 RX ADMIN — Medication 2 L/MIN: at 08:25

## 2024-11-06 RX ADMIN — Medication: at 11:27

## 2024-11-06 RX ADMIN — CETIRIZINE HYDROCHLORIDE 5 MG: 10 TABLET, FILM COATED ORAL at 09:18

## 2024-11-06 RX ADMIN — IPRATROPIUM BROMIDE AND ALBUTEROL SULFATE 3 ML: 2.5; .5 SOLUTION RESPIRATORY (INHALATION) at 08:25

## 2024-11-06 RX ADMIN — BISMUTH SUBSALICYLATE 262 MG: 262 TABLET, CHEWABLE ORAL at 17:15

## 2024-11-06 RX ADMIN — SUCRALFATE 1 G: 1 SUSPENSION ORAL at 11:30

## 2024-11-06 RX ADMIN — NICOTINE 1 PATCH: 21 PATCH, EXTENDED RELEASE TRANSDERMAL at 09:29

## 2024-11-06 RX ADMIN — BISMUTH SUBSALICYLATE 262 MG: 262 TABLET, CHEWABLE ORAL at 06:00

## 2024-11-06 RX ADMIN — GABAPENTIN 600 MG: 300 CAPSULE ORAL at 20:44

## 2024-11-06 RX ADMIN — Medication 400 MG: at 09:20

## 2024-11-06 RX ADMIN — TETRACYCLINE HYDROCHLORIDE 500 MG: 500 CAPSULE ORAL at 06:00

## 2024-11-06 RX ADMIN — PIPERACILLIN SODIUM AND TAZOBACTAM SODIUM 4.5 G: 4; .5 INJECTION, SOLUTION INTRAVENOUS at 09:21

## 2024-11-06 RX ADMIN — AMLODIPINE BESYLATE 10 MG: 5 TABLET ORAL at 09:17

## 2024-11-06 RX ADMIN — IPRATROPIUM BROMIDE AND ALBUTEROL SULFATE 3 ML: 2.5; .5 SOLUTION RESPIRATORY (INHALATION) at 14:45

## 2024-11-06 RX ADMIN — TETRACYCLINE HYDROCHLORIDE 500 MG: 500 CAPSULE ORAL at 20:44

## 2024-11-06 RX ADMIN — SUCRALFATE 1 G: 1 TABLET ORAL at 06:00

## 2024-11-06 RX ADMIN — PANTOPRAZOLE SODIUM 40 MG: 40 TABLET, DELAYED RELEASE ORAL at 09:19

## 2024-11-06 RX ADMIN — POTASSIUM CHLORIDE 40 MEQ: 1.5 POWDER, FOR SOLUTION ORAL at 09:19

## 2024-11-06 RX ADMIN — ACETAMINOPHEN 975 MG: 325 TABLET ORAL at 20:44

## 2024-11-06 RX ADMIN — GABAPENTIN 600 MG: 300 CAPSULE ORAL at 09:18

## 2024-11-06 RX ADMIN — ENOXAPARIN SODIUM 40 MG: 40 INJECTION SUBCUTANEOUS at 09:21

## 2024-11-06 RX ADMIN — PIPERACILLIN SODIUM AND TAZOBACTAM SODIUM 4.5 G: 4; .5 INJECTION, SOLUTION INTRAVENOUS at 01:37

## 2024-11-06 RX ADMIN — FOLIC ACID 1 MG: 1 TABLET ORAL at 09:18

## 2024-11-06 RX ADMIN — GABAPENTIN 600 MG: 300 CAPSULE ORAL at 14:12

## 2024-11-06 RX ADMIN — ACETAMINOPHEN 975 MG: 325 TABLET ORAL at 06:00

## 2024-11-06 RX ADMIN — TETRACYCLINE HYDROCHLORIDE 500 MG: 500 CAPSULE ORAL at 17:15

## 2024-11-06 RX ADMIN — BISMUTH SUBSALICYLATE 262 MG: 262 TABLET, CHEWABLE ORAL at 14:13

## 2024-11-06 RX ADMIN — HYDROCORTISONE: 1 CREAM TOPICAL at 21:08

## 2024-11-06 RX ADMIN — BISMUTH SUBSALICYLATE 262 MG: 262 TABLET, CHEWABLE ORAL at 20:44

## 2024-11-06 RX ADMIN — IPRATROPIUM BROMIDE AND ALBUTEROL SULFATE 3 ML: 2.5; .5 SOLUTION RESPIRATORY (INHALATION) at 20:16

## 2024-11-06 RX ADMIN — PIPERACILLIN SODIUM AND TAZOBACTAM SODIUM 4.5 G: 4; .5 INJECTION, SOLUTION INTRAVENOUS at 15:50

## 2024-11-06 RX ADMIN — TETRACYCLINE HYDROCHLORIDE 500 MG: 500 CAPSULE ORAL at 14:12

## 2024-11-06 RX ADMIN — SUCRALFATE 1 G: 1 SUSPENSION ORAL at 17:15

## 2024-11-06 RX ADMIN — FLUTICASONE PROPIONATE 2 SPRAY: 50 SPRAY, METERED NASAL at 06:03

## 2024-11-06 RX ADMIN — BENZOCAINE AND MENTHOL 1 LOZENGE: 15; 3.6 LOZENGE ORAL at 06:03

## 2024-11-06 RX ADMIN — POTASSIUM CHLORIDE 20 MEQ: 14.9 INJECTION, SOLUTION INTRAVENOUS at 09:26

## 2024-11-06 RX ADMIN — HYDROCORTISONE: 1 CREAM TOPICAL at 11:27

## 2024-11-06 RX ADMIN — PANTOPRAZOLE SODIUM 40 MG: 40 TABLET, DELAYED RELEASE ORAL at 20:44

## 2024-11-06 RX ADMIN — ACETAMINOPHEN 975 MG: 325 TABLET ORAL at 11:30

## 2024-11-06 RX ADMIN — PIPERACILLIN SODIUM AND TAZOBACTAM SODIUM 4.5 G: 4; .5 INJECTION, SOLUTION INTRAVENOUS at 20:43

## 2024-11-06 ASSESSMENT — COGNITIVE AND FUNCTIONAL STATUS - GENERAL
STANDING UP FROM CHAIR USING ARMS: A LITTLE
CLIMB 3 TO 5 STEPS WITH RAILING: A LITTLE
DAILY ACTIVITIY SCORE: 22
WALKING IN HOSPITAL ROOM: A LITTLE
DRESSING REGULAR LOWER BODY CLOTHING: A LITTLE
MOVING TO AND FROM BED TO CHAIR: A LITTLE
MOBILITY SCORE: 20
HELP NEEDED FOR BATHING: A LITTLE

## 2024-11-06 ASSESSMENT — PAIN - FUNCTIONAL ASSESSMENT
PAIN_FUNCTIONAL_ASSESSMENT: 0-10
PAIN_FUNCTIONAL_ASSESSMENT: 0-10

## 2024-11-06 ASSESSMENT — PAIN SCALES - GENERAL
PAINLEVEL_OUTOF10: 4
PAINLEVEL_OUTOF10: 0 - NO PAIN

## 2024-11-06 ASSESSMENT — PAIN DESCRIPTION - LOCATION: LOCATION: BACK

## 2024-11-06 NOTE — CARE PLAN
The patient's goals for the shift include      The clinical goals for the shift include Pt will remain HDS and comfortable this shift.    Over the shift, the patient did make progress toward the following goals. VS stable, electrolytes replaced, DANIELLE drain removed by surgical team, pt denies pain, monitored and medicated as ordered this shift.

## 2024-11-06 NOTE — CARE PLAN
The patient's goals for the shift include      The clinical goals for the shift include patient will remain hds    Over the shift, the patient did not make progress toward the following goals. Barriers to progression include none patient is hds. Recommendations to address these barriers include continue with plan of care.

## 2024-11-06 NOTE — PROGRESS NOTES
"   11/06/24 1056   Discharge Planning   Living Arrangements Spouse/significant other   Support Systems Spouse/significant other  (mother in law, daughter, grandson)   Assistance Needed A&OX3, independent in ADLs, ambulates without assistive devices, drives, and wears no O2, CPAP or Bipap at baseline. No active HC agency.   Type of Residence Private residence   Number of Stairs to Enter Residence 5   Number of Stairs Within Residence 0   Do you have animals or pets at home? Yes   Type of Animals or Pets 4 dogs   Who is requesting discharge planning? Provider   Home or Post Acute Services None   Expected Discharge Disposition Home  (PT recommending low, patient refusing HHC due to \" mean dog\", patient on 2L here and none baseline.)   Does the patient need discharge transport arranged? No   RoundTrip coordination needed? No   Has discharge transport been arranged? No       "

## 2024-11-06 NOTE — PROGRESS NOTES
Taqueria Montano is a 64 y.o. male on day 5 of admission presenting with Perforated ulcer (Multi).    Subjective   No acute overnight events.   Pt pain is improving.   Tolerating full liquid diet. No GERD, no hiccups. No n/v.   Passing gas. Having loose stools.   No CP, SOB.     States he is not tolerating the carafate pill and is asking if can have in liquid form.       Objective     Physical Exam  Vitals reviewed.   Constitutional:       General: He is not in acute distress.     Appearance: Normal appearance. He is normal weight. He is not ill-appearing, toxic-appearing or diaphoretic.   HENT:      Head: Normocephalic and atraumatic.   Eyes:      General: No scleral icterus.        Right eye: No discharge.         Left eye: No discharge.      Conjunctiva/sclera: Conjunctivae normal.   Cardiovascular:      Rate and Rhythm: Normal rate and regular rhythm.      Pulses: Normal pulses.      Heart sounds: Normal heart sounds. No murmur heard.     No friction rub. No gallop.   Pulmonary:      Effort: Pulmonary effort is normal. No respiratory distress.      Breath sounds: No stridor. No wheezing, rhonchi or rales.      Comments: Dim cm throughout.  Chest:      Chest wall: No tenderness.   Abdominal:      General: Bowel sounds are normal. There is distension.      Tenderness: There is abdominal tenderness.      Comments: + BS x 4 q.   Abd with tenderness throughout.   There are multiple surgical sites on abd that are closed with glue. No swelling, erythema, ecchymosis.   Right abd with DANIELLE drain in place. Skin around the drain site with flat erythema.   DANIELLE drain removed, dry sterile dressing in place.   Musculoskeletal:      Right lower leg: No edema.      Left lower leg: No edema.   Skin:     General: Skin is warm and dry.   Neurological:      Mental Status: He is alert and oriented to person, place, and time.      Motor: Weakness (generalized) present.      Gait: Abnormal gait: not observed..   Psychiatric:         Mood  "and Affect: Mood normal.         Behavior: Behavior normal.         Thought Content: Thought content normal.         Judgment: Judgment normal.         Last Recorded Vitals  Blood pressure 143/75, pulse 87, temperature 36.2 °C (97.2 °F), temperature source Temporal, resp. rate 19, height 1.753 m (5' 9.02\"), weight 73.7 kg (162 lb 7.7 oz), SpO2 93%.  At time of exam HR 80 and pulse ox 95%.    Intake/Output last 3 Shifts:  I/O last 3 completed shifts:  In: 1030 (14 mL/kg) [P.O.:320; IV Piggyback:710]  Out: 40 (0.5 mL/kg) [Drains:40]  Weight: 73.7 kg     Relevant Results    FL upper GI w KUB    Result Date: 11/4/2024  Interpreted By:  Sowmya Stanford, STUDY: FL UPPER GI W KUB; 11/4/2024 11:27 am   INDICATION: Signs/Symptoms:perforated gastric ulcer.   COMPARISON: CT abdomen pelvis 10/31/2024   ACCESSION NUMBER(S): [id number]   ORDERING CLINICIAN: BEST LOBATO   TECHNIQUE: Targeted postoperative upper GI fluoroscopy examination following oral administration of water-soluble contrast (60 ml Gastrografin).   Fluoroscopy time: 161 seconds   FINDINGS: : Scattered dilated small bowel loops with air-fluid levels, likely postoperative ileus.   Contrast opacifies the duodenum without extraluminal contrast extravasation or pooling. Contrast continues to opacify the remaining small bowel without evidence of obstruction.       No leak or obstruction.   MACRO: None     Signed by: Sowmya Stanford 11/4/2024 4:40 PM Dictation workstation:   [id number]    ECG 12 lead    Result Date: 11/1/2024   Poor data quality, interpretation may be adversely affected Normal sinus rhythm Possible Left atrial enlargement Cannot rule out Anterior infarct , age undetermined Abnormal ECG No previous ECGs available    ECG 12 lead    Result Date: 11/1/2024  Normal sinus rhythm Poor R-wave progression ; consider septal infarct, lead placement, or normal variant Abnormal ECG When compared with ECG of 31-OCT-2024 17:01, (unconfirmed) No significant " change was found    XR chest 1 view    Result Date: 11/1/2024  Interpreted By:  Stanley Harris, STUDY: XR CHEST 1 VIEW;  11/1/2024 1:46 am   INDICATION: Signs/Symptoms:Ng tube.     COMPARISON: 10/31/2024   ACCESSION NUMBER(S): [id number]   ORDERING CLINICIAN: DIAMOND MIX   FINDINGS: - NG/OG tube termination: Gastric fundus   Low lung volumes results in basilar and perihilar bronchovascular crowding; however, there is no evidence of consolidation or effusion. There is bibasilar atelectasis.   No pneumothorax.   Gaseous distension of the colon with colonic interposition. There is scattered pneumoperitoneum most pronounced in the right upper quadrant.       NG/OG tube terminates over the gastric fundus.   Similar appearance of pneumoperitoneum in the right upper quadrant.   MACRO: None.   Signed by: Stanley Harris 11/1/2024 1:50 AM Dictation workstation:   [id number]    XR chest 1 view    Result Date: 10/31/2024  Interpreted By:  Nuno Guillory, STUDY: XR CHEST 1 VIEW;; 10/31/2024 7:57 pm   INDICATION: Signs/Symptoms:sob.   COMPARISON: 11/28/2014 and CT abdomen from 10/31/2024   ACCESSION NUMBER(S): [id number]   ORDERING CLINICIAN: MATTHIAS EARL   FINDINGS: Right subdiaphragmatic air lucency similar to previous CT scan of the abdomen, increased in size since prior suggestive of free intraperitoneal air. Mild left basilar opacity suggestive atelectasis/scarring or infiltrate. The remainder lung fields are clear bilaterally. Tortuous and possibly ectatic aorta with wall calcification. Aneurysm or dissection can not be excluded in the basis of this exam only.       1. Mild left basilar atelectasis/scarring or infiltrate. Recommend clinical correlation and follow-up with PA and lateral chest x-rays to document resolution. 2. Tortuous a possibly ectatic aorta with wall calcification but not significantly changed since previous exam accounting for difference in technique of imaging. If there is clinical concern  for acute aortic pathology or pulmonary embolic disease, further evaluation with chest CT is recommended for better assessment. 3. Lucency within the right subdiaphragmatic space is abnormal and may reflect increased in size of intraperitoneal air such as related to previous surgery or bowel perforation. Recommend clinical correlation and follow-up.     SUPPLEMENTAL INFORMATION: Notifi message was left for MATTHIAS EARL regarding this exam by Dr. Guillory on 10/31/2024 at approximately 20:29 hours.     Critical Finding:  See findings. Notification was initiated on 10/31/2024 at 8:28 pm by  Nuno Guillory.  (**-OCF-**) Instructions:   Signed by: Nuno Guillory 10/31/2024 8:29 PM Dictation workstation:   [id number]    CT abdomen pelvis w IV contrast    Result Date: 10/31/2024  Interpreted By:  Alec Llamas, STUDY: CT ABDOMEN PELVIS W IV CONTRAST;  10/31/2024 6:52 pm   INDICATION: Signs/Symptoms:abdominal pain.   COMPARISON: None.   ACCESSION NUMBER(S): [id number]   ORDERING CLINICIAN: MATTHIAS EARL   TECHNIQUE: Contiguous axial images of the abdomen and pelvis were obtained after the intravenous administration of  contrast. Coronal and sagittal reformatted images were obtained from the axial images.   FINDINGS: There is limited evaluation of the lung bases. Bibasilar subsegmental atelectasis.   No evidence of liver mass. The gallbladder is present. No significant dilatation common bile duct.   The pancreas, spleen, and adrenal glands appear unremarkable.   Symmetric enhancement of the kidneys. A subcentimeter hypodensity in the upper pole the left kidney is too small to characterize. No hydronephrosis.   Atherosclerotic calcification of the aorta and abdominopelvic vasculature. There is 2.4 cm area of mild ectasia of the infrarenal abdominal aorta.   There is wall thickening and irregularity of the stomach. There is 4 cm x 1.4 cm irregular heterogeneous area of hypoattenuation in the wall of the greater curvature of  the stomach on coronal image 35 of 119 concerning for underlying gastric ulcer. There is evidence of free abdominal air compatible with viscus perforation. There is also small amount of free fluid in the abdomen and pelvis. There is peripheral air in segment of the proximal transverse colon in the right abdomen.   Urinary bladder is underdistended and urinary bladder wall thickening is not excluded.   No acute fracture of the lumbar spine.       Wall thickening and irregularity of the stomach. There is 4 cm x 1.4 cm irregular heterogeneous area of hypoattenuation in the wall of the greater curvature of the stomach on coronal image 35 of 119 concerning for underlying gastric ulcer. There is evidence of free abdominal air compatible with viscus perforation and the findings are concerning for perforated gastric ulcer. Other source of perforation of the bowel is also not excluded. Heterogeneous hyperdensity in the stomach lumen may correspond to hemorrhage. Small amount of free fluid in the abdomen and pelvis likely relating to the perforation.   Foci of peripheral air in segment of the proximal transverse colon the right abdomen and pneumatosis ischemia is not excluded.   Underdistention versus urinary bladder wall thickening; please correlate urinalysis to exclude cystitis.   MACRO: Alec Llamas discussed the significance and urgency of this critical finding by telephone with the emergency room physician on 10/31/2024 at 7:55 pm.  (**-RCF-**) Findings:  See findings.   Signed by: Alec Llamas 10/31/2024 7:55 PM Dictation workstation:   [id number]     Scheduled medications  acetaminophen, 975 mg, oral, q6h  amLODIPine, 10 mg, oral, Daily  bismuth subsalicylate, 262 mg, oral, 4x daily  cetirizine, 5 mg, oral, Daily  enoxaparin, 40 mg, subcutaneous, q24h  fluticasone, 2 spray, Each Nostril, Daily  folic acid, 1 mg, oral, Daily  gabapentin, 600 mg, oral, TID  hydrocortisone, , Topical, BID  ipratropium-albuteroL, 3 mL,  nebulization, TID  nicotine, 1 patch, transdermal, Daily  pantoprazole, 40 mg, oral, BID  piperacillin-tazobactam, 4.5 g, intravenous, q6h  potassium chloride, 20 mEq, intravenous, q2h  sucralfate, 1 g, oral, q6h DM  tetracycline, 500 mg, oral, 4x daily      Continuous medications     PRN medications  PRN medications: albuterol, [Held by provider] albuterol, baclofen, benzocaine-menthol, oxyCODONE, oxygen, sodium chloride, white petrolatum  Results for orders placed or performed during the hospital encounter of 11/01/24 (from the past 24 hours)   CBC   Result Value Ref Range    WBC 15.0 (H) 4.4 - 11.3 x10*3/uL    nRBC 0.0 0.0 - 0.0 /100 WBCs    RBC 4.02 (L) 4.50 - 5.90 x10*6/uL    Hemoglobin 13.5 13.5 - 17.5 g/dL    Hematocrit 37.4 (L) 41.0 - 52.0 %    MCV 93 80 - 100 fL    MCH 33.6 26.0 - 34.0 pg    MCHC 36.1 (H) 32.0 - 36.0 g/dL    RDW 13.5 11.5 - 14.5 %    Platelets 430 150 - 450 x10*3/uL   Renal function panel   Result Value Ref Range    Glucose 85 74 - 99 mg/dL    Sodium 135 (L) 136 - 145 mmol/L    Potassium 3.2 (L) 3.5 - 5.3 mmol/L    Chloride 101 98 - 107 mmol/L    Bicarbonate 23 21 - 32 mmol/L    Anion Gap 14 10 - 20 mmol/L    Urea Nitrogen 10 6 - 23 mg/dL    Creatinine 0.63 0.50 - 1.30 mg/dL    eGFR >90 >60 mL/min/1.73m*2    Calcium 8.0 (L) 8.6 - 10.3 mg/dL    Phosphorus 4.3 2.5 - 4.9 mg/dL    Albumin 2.7 (L) 3.4 - 5.0 g/dL   Magnesium   Result Value Ref Range    Magnesium 1.80 1.60 - 2.40 mg/dL                            Assessment/Plan   Assessment & Plan  Perforated ulcer (Multi)    Primary hypertension    Patient is 5 days post op laparoscopic anibal patch ulcer perforation repair with Dr. Burton on 11/1/24.   - imaging reviewed  - labs reviewed. Anemia improving. WBC down trending. Kidney function stable. Hypokalemia noted, replacement ordered. Magnesium stable.   - DANIELLE drain output 20 ml in last 24 hours, output has been serous. DANIELLE drain removed and dry sterile dressing applied. No showering/soaks  until this has closed up. May sponge bathe.   - cont PT. Enc pt to get up and move as much as able.   - tolerating full liquid diet. Can advance to soft diet.   - lovenox for anticoagulation prophylaxis   - multimodal pain regimen per primary  - cont PPI  - pt unable to tolerate carafate pills, changed to liquid, order placed.      Hypokalemia  - replacement ordered      Irritant dermatitis  - Topical hydrocortisone cream BID x 1 week, ordered.   - Can use vasaline q 1 hr PRN, ordered.    Remainder of care per primary.        I spent 35 minutes in the professional and overall care of this patient.    Dr. Burton updated on plan of care.     Vickie Helms, APRN-CNP

## 2024-11-06 NOTE — DISCHARGE INSTRUCTIONS
Please follow up with Dr. Burton in the out patient clinic   You have an appointment on 11/20/24 at 2:30 pm at the [place] location.  [address] [facility] [address]  [place] [address]  [phone]      FOLLOW-UP: Please keep your appointment with Dr. Burton in 2 weeks. Call Dr. Burton's office ([phone]) or come to Emergency Department (ED)  immediately if you have any fevers greater than 102.5 degrees Fahrenheit, drainage from your wound that is not clear or looks infected, persistent bleeding, increasing abdominal pain, problems urinating, or persistent nausea/vomiting or any concerns.      WOUND CARE INSTRUCTIONS: Keep a dressing on the right abdomen drain site until it is closed up. Keep a dry, clean dressing on the wound if there is drainage. The initial bandage may be removed after 24 hours. Once the wound has quit draining, you may leave it open to air. If clothing rubs against the wound or causes irritation and the wound is not draining, you may cover it with a dry dressing during the daytime. Try to keep the wound dry and avoid ointments on the wound unless directed to do so. If the wound becomes bright red and painful or starts to drain infected material that is not clear, please contact Dr. Burton's office immediately.  If the wound is mildly pink and has a thick firm ridge underneath it, this is normal and is referred to as a healing ridge. This will resolve over the next 4-6 weeks.    You can use topical hydrocortisone to the irritated skin on the abdomen. Apply this twice a day for up to 2 weeks. You can also use a moisturizer such as eucerin cream 3 times a day.     Some bruise around the incision is normal. You should call Dr. Burton's office or come to ED with no delay if the bruise is expanding and painful.     DIET: You may eat soft diet for 2-3 days, then you can eat regular diet. It is a good idea to take in plenty of fluids to prevent constipation. If you do become constipated, you may want to  take a mild laxative or take Dulcolax tablets on a daily basis until your bowel habits are regular. After recent abdominal surgery, constipation can be very uncomfortable and straining.    ACTIVITY: You are encouraged to cough and take deep breaths, or if you were given one, use your incentive spirometer every 30 minutes when awake. This will help prevent respiratory complications and low grade fevers post-operatively. You may want to hug a pillow when coughing and sneezing to add additional support to the surgical area which will decrease pain during these times. You are encouraged to walk and engage in light activity for the next two weeks. You should not lift more than 10 pounds during this time frame as it could put you at increased risk for a post-operative hernia. A gallon of milk is about 8 lb.     If surgery is done in open fashion, you should not lift more than 10 pounds or do sit ups for 6-8 weeks. You should also wear an abdominal binder for 6-8 weeks. You do not need to wear binder when you sleep.      MEDICATIONS: Try to take narcotic medications and anti-inflammatory medications, such as Tylenol, ibuprofen, Naprosyn, etc., with food. This will minimize stomach upset from the medication. Should you develop nausea and vomiting from the pain medication, or develop a rash, please discontinue the medication and contact your physician. You should not drive, make important decisions, or operate machinery when taking narcotic pain medication.    IF YOU TAKE BLOOD THINNERS SUCH AS PLAVIX, XARELTO, ELIQUIS, COUMADIN OR OTHERS, YOU CAN RESTART THE MEDICATION ON THIS DATE: you may resume.      QUESTIONS: Please feel free to call Dr. Burton's office if you have any questions.

## 2024-11-07 VITALS
BODY MASS INDEX: 24.07 KG/M2 | RESPIRATION RATE: 27 BRPM | DIASTOLIC BLOOD PRESSURE: 77 MMHG | HEIGHT: 69 IN | HEART RATE: 83 BPM | WEIGHT: 162.48 LBS | TEMPERATURE: 98.6 F | SYSTOLIC BLOOD PRESSURE: 148 MMHG | OXYGEN SATURATION: 91 %

## 2024-11-07 LAB
ALBUMIN SERPL BCP-MCNC: 2.7 G/DL (ref 3.4–5)
ANION GAP SERPL CALC-SCNC: 15 MMOL/L (ref 10–20)
BUN SERPL-MCNC: 11 MG/DL (ref 6–23)
CALCIUM SERPL-MCNC: 8.1 MG/DL (ref 8.6–10.3)
CHLORIDE SERPL-SCNC: 103 MMOL/L (ref 98–107)
CO2 SERPL-SCNC: 22 MMOL/L (ref 21–32)
CREAT SERPL-MCNC: 0.67 MG/DL (ref 0.5–1.3)
EGFRCR SERPLBLD CKD-EPI 2021: >90 ML/MIN/1.73M*2
ERYTHROCYTE [DISTWIDTH] IN BLOOD BY AUTOMATED COUNT: 13.5 % (ref 11.5–14.5)
GLUCOSE SERPL-MCNC: 101 MG/DL (ref 74–99)
HCT VFR BLD AUTO: 34.9 % (ref 41–52)
HGB BLD-MCNC: 12.4 G/DL (ref 13.5–17.5)
MAGNESIUM SERPL-MCNC: 1.85 MG/DL (ref 1.6–2.4)
MCH RBC QN AUTO: 33.3 PG (ref 26–34)
MCHC RBC AUTO-ENTMCNC: 35.5 G/DL (ref 32–36)
MCV RBC AUTO: 94 FL (ref 80–100)
NRBC BLD-RTO: 0 /100 WBCS (ref 0–0)
PHOSPHATE SERPL-MCNC: 4.5 MG/DL (ref 2.5–4.9)
PLATELET # BLD AUTO: 450 X10*3/UL (ref 150–450)
POTASSIUM SERPL-SCNC: 3.6 MMOL/L (ref 3.5–5.3)
RBC # BLD AUTO: 3.72 X10*6/UL (ref 4.5–5.9)
SODIUM SERPL-SCNC: 136 MMOL/L (ref 136–145)
WBC # BLD AUTO: 14.8 X10*3/UL (ref 4.4–11.3)

## 2024-11-07 PROCEDURE — 99239 HOSP IP/OBS DSCHRG MGMT >30: CPT

## 2024-11-07 PROCEDURE — 2500000002 HC RX 250 W HCPCS SELF ADMINISTERED DRUGS (ALT 637 FOR MEDICARE OP, ALT 636 FOR OP/ED): Performed by: NURSE PRACTITIONER

## 2024-11-07 PROCEDURE — 83735 ASSAY OF MAGNESIUM: CPT

## 2024-11-07 PROCEDURE — 2500000004 HC RX 250 GENERAL PHARMACY W/ HCPCS (ALT 636 FOR OP/ED)

## 2024-11-07 PROCEDURE — 97110 THERAPEUTIC EXERCISES: CPT | Mod: GP

## 2024-11-07 PROCEDURE — 2500000002 HC RX 250 W HCPCS SELF ADMINISTERED DRUGS (ALT 637 FOR MEDICARE OP, ALT 636 FOR OP/ED)

## 2024-11-07 PROCEDURE — 2500000001 HC RX 250 WO HCPCS SELF ADMINISTERED DRUGS (ALT 637 FOR MEDICARE OP)

## 2024-11-07 PROCEDURE — 94761 N-INVAS EAR/PLS OXIMETRY MLT: CPT

## 2024-11-07 PROCEDURE — 94760 N-INVAS EAR/PLS OXIMETRY 1: CPT

## 2024-11-07 PROCEDURE — S4991 NICOTINE PATCH NONLEGEND: HCPCS

## 2024-11-07 PROCEDURE — 97116 GAIT TRAINING THERAPY: CPT | Mod: GP

## 2024-11-07 PROCEDURE — 2500000002 HC RX 250 W HCPCS SELF ADMINISTERED DRUGS (ALT 637 FOR MEDICARE OP, ALT 636 FOR OP/ED): Performed by: INTERNAL MEDICINE

## 2024-11-07 PROCEDURE — 36415 COLL VENOUS BLD VENIPUNCTURE: CPT

## 2024-11-07 PROCEDURE — 94640 AIRWAY INHALATION TREATMENT: CPT

## 2024-11-07 PROCEDURE — 99222 1ST HOSP IP/OBS MODERATE 55: CPT | Performed by: INTERNAL MEDICINE

## 2024-11-07 PROCEDURE — 2500000005 HC RX 250 GENERAL PHARMACY W/O HCPCS: Performed by: INTERNAL MEDICINE

## 2024-11-07 PROCEDURE — 85027 COMPLETE CBC AUTOMATED: CPT

## 2024-11-07 PROCEDURE — 80069 RENAL FUNCTION PANEL: CPT

## 2024-11-07 RX ORDER — POTASSIUM CHLORIDE 1.5 G/1.58G
40 POWDER, FOR SOLUTION ORAL ONCE
Status: COMPLETED | OUTPATIENT
Start: 2024-11-07 | End: 2024-11-07

## 2024-11-07 RX ORDER — METRONIDAZOLE 500 MG/1
500 TABLET ORAL 4 TIMES DAILY
Qty: 28 TABLET | Refills: 0 | Status: SHIPPED | OUTPATIENT
Start: 2024-11-07 | End: 2024-11-14

## 2024-11-07 RX ORDER — LANOLIN ALCOHOL/MO/W.PET/CERES
400 CREAM (GRAM) TOPICAL DAILY
Status: COMPLETED | OUTPATIENT
Start: 2024-11-07 | End: 2024-11-07

## 2024-11-07 RX ORDER — METRONIDAZOLE 500 MG/1
500 TABLET ORAL EVERY 6 HOURS SCHEDULED
Status: DISCONTINUED | OUTPATIENT
Start: 2024-11-07 | End: 2024-11-07 | Stop reason: HOSPADM

## 2024-11-07 RX ADMIN — PANTOPRAZOLE SODIUM 40 MG: 40 TABLET, DELAYED RELEASE ORAL at 08:31

## 2024-11-07 RX ADMIN — GABAPENTIN 600 MG: 300 CAPSULE ORAL at 08:30

## 2024-11-07 RX ADMIN — SUCRALFATE 1 G: 1 SUSPENSION ORAL at 06:21

## 2024-11-07 RX ADMIN — Medication 400 MG: at 08:31

## 2024-11-07 RX ADMIN — TETRACYCLINE HYDROCHLORIDE 500 MG: 500 CAPSULE ORAL at 06:21

## 2024-11-07 RX ADMIN — TETRACYCLINE HYDROCHLORIDE 500 MG: 500 CAPSULE ORAL at 12:44

## 2024-11-07 RX ADMIN — CETIRIZINE HYDROCHLORIDE 5 MG: 10 TABLET, FILM COATED ORAL at 08:31

## 2024-11-07 RX ADMIN — IPRATROPIUM BROMIDE AND ALBUTEROL SULFATE 3 ML: 2.5; .5 SOLUTION RESPIRATORY (INHALATION) at 07:48

## 2024-11-07 RX ADMIN — IPRATROPIUM BROMIDE AND ALBUTEROL SULFATE 3 ML: 2.5; .5 SOLUTION RESPIRATORY (INHALATION) at 14:46

## 2024-11-07 RX ADMIN — AMLODIPINE BESYLATE 10 MG: 5 TABLET ORAL at 08:31

## 2024-11-07 RX ADMIN — PIPERACILLIN SODIUM AND TAZOBACTAM SODIUM 4.5 G: 4; .5 INJECTION, SOLUTION INTRAVENOUS at 03:28

## 2024-11-07 RX ADMIN — PIPERACILLIN SODIUM AND TAZOBACTAM SODIUM 4.5 G: 4; .5 INJECTION, SOLUTION INTRAVENOUS at 08:29

## 2024-11-07 RX ADMIN — SUCRALFATE 1 G: 1 SUSPENSION ORAL at 12:44

## 2024-11-07 RX ADMIN — NICOTINE 1 PATCH: 21 PATCH, EXTENDED RELEASE TRANSDERMAL at 08:33

## 2024-11-07 RX ADMIN — HYDROCORTISONE: 1 CREAM TOPICAL at 09:01

## 2024-11-07 RX ADMIN — FLUTICASONE PROPIONATE 2 SPRAY: 50 SPRAY, METERED NASAL at 08:28

## 2024-11-07 RX ADMIN — FOLIC ACID 1 MG: 1 TABLET ORAL at 08:31

## 2024-11-07 RX ADMIN — ACETAMINOPHEN 975 MG: 325 TABLET ORAL at 08:33

## 2024-11-07 RX ADMIN — METRONIDAZOLE 500 MG: 500 TABLET ORAL at 14:24

## 2024-11-07 RX ADMIN — BISMUTH SUBSALICYLATE 262 MG: 262 TABLET, CHEWABLE ORAL at 12:44

## 2024-11-07 RX ADMIN — GABAPENTIN 600 MG: 300 CAPSULE ORAL at 14:24

## 2024-11-07 RX ADMIN — ENOXAPARIN SODIUM 40 MG: 40 INJECTION SUBCUTANEOUS at 08:31

## 2024-11-07 RX ADMIN — ACETAMINOPHEN 975 MG: 325 TABLET ORAL at 14:23

## 2024-11-07 RX ADMIN — Medication 2 L/MIN: at 07:41

## 2024-11-07 RX ADMIN — BISMUTH SUBSALICYLATE 262 MG: 262 TABLET, CHEWABLE ORAL at 06:21

## 2024-11-07 RX ADMIN — POTASSIUM CHLORIDE 40 MEQ: 1.5 POWDER, FOR SOLUTION ORAL at 08:31

## 2024-11-07 RX ADMIN — ACETAMINOPHEN 975 MG: 325 TABLET ORAL at 03:28

## 2024-11-07 ASSESSMENT — PAIN SCALES - GENERAL
PAINLEVEL_OUTOF10: 0 - NO PAIN
PAINLEVEL_OUTOF10: 0 - NO PAIN
PAINLEVEL_OUTOF10: 4

## 2024-11-07 ASSESSMENT — COGNITIVE AND FUNCTIONAL STATUS - GENERAL
CLIMB 3 TO 5 STEPS WITH RAILING: A LITTLE
STANDING UP FROM CHAIR USING ARMS: A LITTLE
MOVING TO AND FROM BED TO CHAIR: A LITTLE
WALKING IN HOSPITAL ROOM: A LITTLE
MOBILITY SCORE: 20

## 2024-11-07 ASSESSMENT — ENCOUNTER SYMPTOMS
SHORTNESS OF BREATH: 1
PSYCHIATRIC NEGATIVE: 1
FEVER: 0
COUGH: 1
CARDIOVASCULAR NEGATIVE: 1
GASTROINTESTINAL NEGATIVE: 1
NEUROLOGICAL NEGATIVE: 1
CHILLS: 0

## 2024-11-07 ASSESSMENT — PAIN - FUNCTIONAL ASSESSMENT: PAIN_FUNCTIONAL_ASSESSMENT: 0-10

## 2024-11-07 NOTE — DISCHARGE INSTR - APPOINTMENTS
Follow up with your [facility]  [facility] doctor needs to send a request for you to see a pulmonologist (lung doctor[facility] at the [facility]  [facility] pulmonologist will do a risk assessment and determine if you need a smoking cessation program  After that is completed, the [facility] will determine if they will cover your home oxygen.  Your [facility]  [facility] (ph [phone]) will call you tonight.  She will follow with you to make sure you get the follow-up need from the [facility].

## 2024-11-07 NOTE — CONSULTS
Inpatient consult to Pulmonology  Consult performed by: Rich Leon MD  Consult ordered by: Milan Escobar DO  Reason for consult: hypoxemia  Assessment/Recommendations: 3 yo man w/ h/o COPD, HTN admitted for perforated ulcer.     Hypoxemia - requires 2L O2 at rest and 6L on exertion.  Suspect patient had chronic hypoxemia or low normal oxygenation now worse in setting of abdominal surgery w/ possible element of atelectasis.  Cont incentive spirometry     COPD - on nebs.  Pt needs further evaluation as outpatient.    Pt should follow-[facility] with [facility] with [facility].          Reason For Consult  hypoxemia    History Of Present Illness  Taqueria Montano is a 64 y.o. male presenting with perforated gastric ulcer.  Pt w/ h/o COPD HTN.  Had gastric ulcer s/p laparoscopic repair.  Noted to be persistently hypoxic during admission requiring 2L at rest and 6L on exertion to maintain sats.  No fevers or chills. Has cough.  Maintained on combivent.  Has emphysema on CT dating back to 2012.  ET is about 50 feet.    Pt is a current 100 pack year smoker.  Was in the [employer].  Has dogs.  No family history of lung disease     Past Medical History  Past Medical History:   Diagnosis Date    Chronic back pain     COPD (chronic obstructive pulmonary disease) (Multi)     H/O tuberculosis 11/2014    completed  treatment    HTN (hypertension)     Melena 09/18/2014    Blood in the stool    Personal history of colonic polyps 10/02/2014    History of adenomatous polyp of colon    Spinal stenosis, cervical region        Surgical History  Past Surgical History:   Procedure Laterality Date    CATARACT EXTRACTION Bilateral     COLONOSCOPY W/ POLYPECTOMY  10/02/2014    Complete Colonoscopy For Polyp Removal    HERNIA REPAIR  09/11/2014    Hernia Repair        Social History  Social History     Tobacco Use    Smoking status: Every Day     Current packs/day: 2.00     Types: Cigarettes    Smokeless tobacco: Never   Substance Use Topics     "Alcohol use: Yes     Comment: 6 beers daily    Drug use: Never       Family History  No family history on file.     Allergies  Patient has no known allergies.    Review of Systems   Constitutional:  Negative for chills and fever.   Respiratory:  Positive for cough and shortness of breath.    Cardiovascular: Negative.    Gastrointestinal: Negative.    Skin:  Negative for rash.   Neurological: Negative.    Psychiatric/Behavioral: Negative.     All other systems reviewed and are negative.       Physical Exam  Vitals reviewed.   Constitutional:       Appearance: Normal appearance.   HENT:      Head: Normocephalic and atraumatic.   Eyes:      Extraocular Movements: Extraocular movements intact.   Cardiovascular:      Rate and Rhythm: Normal rate and regular rhythm.      Heart sounds: Normal heart sounds.   Pulmonary:      Effort: Pulmonary effort is normal.      Comments: Coarse breath sounds bilaterally   Abdominal:      Palpations: Abdomen is soft.      Tenderness: There is no abdominal tenderness.   Musculoskeletal:      Cervical back: Normal range of motion.   Skin:     General: Skin is warm.   Neurological:      General: No focal deficit present.      Mental Status: He is alert and oriented to person, place, and time. Mental status is at baseline.   Psychiatric:         Mood and Affect: Mood normal.         Behavior: Behavior normal.          Vital Signs  Blood pressure 134/73, pulse 78, temperature 36.9 °C (98.4 °F), temperature source Temporal, resp. rate 22, height 1.753 m (5' 9.02\"), weight 73.7 kg (162 lb 7.7 oz), SpO2 98%.  Oxygen Therapy  SpO2: 98 %  Medical Gas Therapy: Supplemental oxygen  Medical Gas Delivery Method: Nasal cannula       Relevant Results  XR chest 1 view 11/01/2024    Narrative  Interpreted By:  Stanley Harris,  STUDY:  XR CHEST 1 VIEW;  11/1/2024 1:46 am    INDICATION:  Signs/Symptoms:Ng tube.      COMPARISON:  10/31/2024    ACCESSION NUMBER(S):  [id number]    ORDERING " CLINICIAN:  DIAMOND MIX    FINDINGS:  - NG/OG tube termination: Gastric fundus    Low lung volumes results in basilar and perihilar bronchovascular  crowding; however, there is no evidence of consolidation or effusion.  There is bibasilar atelectasis.    No pneumothorax.    Gaseous distension of the colon with colonic interposition. There is  scattered pneumoperitoneum most pronounced in the right upper  quadrant.    Impression  NG/OG tube terminates over the gastric fundus.    Similar appearance of pneumoperitoneum in the right upper quadrant.    MACRO:  None.    Signed by: Stanley Harris 11/1/2024 1:50 AM  Dictation workstation:   [id number]    Scheduled medications  acetaminophen, 975 mg, oral, q6h  amLODIPine, 10 mg, oral, Daily  bismuth subsalicylate, 262 mg, oral, 4x daily  cetirizine, 5 mg, oral, Daily  enoxaparin, 40 mg, subcutaneous, q24h  fluticasone, 2 spray, Each Nostril, Daily  folic acid, 1 mg, oral, Daily  gabapentin, 600 mg, oral, TID  hydrocortisone, , Topical, BID  ipratropium-albuteroL, 3 mL, nebulization, TID  nicotine, 1 patch, transdermal, Daily  pantoprazole, 40 mg, oral, BID  piperacillin-tazobactam, 4.5 g, intravenous, q6h  sucralfate, 1 g, oral, q6h DM  tetracycline, 500 mg, oral, 4x daily      Continuous medications     PRN medications  PRN medications: albuterol, [Held by provider] albuterol, baclofen, benzocaine-menthol, oxyCODONE, oxygen, sodium chloride, white petrolatum    Results for orders placed or performed during the hospital encounter of 11/01/24 (from the past 24 hours)   CBC   Result Value Ref Range    WBC 14.8 (H) 4.4 - 11.3 x10*3/uL    nRBC 0.0 0.0 - 0.0 /100 WBCs    RBC 3.72 (L) 4.50 - 5.90 x10*6/uL    Hemoglobin 12.4 (L) 13.5 - 17.5 g/dL    Hematocrit 34.9 (L) 41.0 - 52.0 %    MCV 94 80 - 100 fL    MCH 33.3 26.0 - 34.0 pg    MCHC 35.5 32.0 - 36.0 g/dL    RDW 13.5 11.5 - 14.5 %    Platelets 450 150 - 450 x10*3/uL   Magnesium   Result Value Ref Range    Magnesium  1.85 1.60 - 2.40 mg/dL   Renal function panel   Result Value Ref Range    Glucose 101 (H) 74 - 99 mg/dL    Sodium 136 136 - 145 mmol/L    Potassium 3.6 3.5 - 5.3 mmol/L    Chloride 103 98 - 107 mmol/L    Bicarbonate 22 21 - 32 mmol/L    Anion Gap 15 10 - 20 mmol/L    Urea Nitrogen 11 6 - 23 mg/dL    Creatinine 0.67 0.50 - 1.30 mg/dL    eGFR >90 >60 mL/min/1.73m*2    Calcium 8.1 (L) 8.6 - 10.3 mg/dL    Phosphorus 4.5 2.5 - 4.9 mg/dL    Albumin 2.7 (L) 3.4 - 5.0 g/dL         Assessment/Plan     65 yo man w/ h/o COPD, HTN admitted for perforated ulcer.     Hypoxemia - requires 2L O2 at rest and 6L on exertion.  Suspect patient had chronic hypoxemia or low normal oxygenation now worse in setting of abdominal surgery w/ possible element of atelectasis.  Cont incentive spirometry     COPD - on nebs.  Pt needs further evaluation as outpatient.    [facility]    Rich Leon MD

## 2024-11-07 NOTE — NURSING NOTE
On room air at rest, patient desatted to 77% and recovered to 90% with 2L nasal cannula. On room air on exertion, patient desatted to 70% and recovered to 90% with 6L nasal cannula

## 2024-11-07 NOTE — PROGRESS NOTES
Physical Therapy    Physical Therapy Treatment    Patient Name: Taqueria Montano  MRN: [id number]  Department: [address]  Room: [address] [place] [address]  Today's Date: 11/7/2024  Time Calculation  Start Time: 1052  Stop Time: 1116  Time Calculation (min): 24 min         Assessment/Plan   PT Assessment  PT Assessment Results: Decreased mobility (deconditioning)  Rehab Prognosis: Good  Barriers to Discharge: none  Evaluation/Treatment Tolerance: Patient tolerated treatment well  Medical Staff Made Aware: Yes  Strengths: Ability to acquire knowledge  End of Session Communication: Bedside nurse  End of Session Patient Position: Bed, 3 rail up, Alarm off, not on at start of session (Pt sitting up at EOB with his tray table in  front of hi with all of his essentials in reach. RN OK with no alarm as Pt is using his call light appropriately)  PT Plan  Inpatient/Swing Bed or Outpatient: Inpatient  PT Plan  Treatment/Interventions: Bed mobility, Transfer training, Gait training, Strengthening, Therapeutic exercise  PT Plan: Ongoing PT  PT Frequency: 3 times per week  PT Discharge Recommendations: Low intensity level of continued care  Equipment Recommended upon Discharge: Wheeled walker  PT Recommended Transfer Status: Independent  PT - OK to Discharge: Yes (Per PT POC)      General Visit Information:   PT  Visit  PT Received On: 11/07/24  Response to Previous Treatment: Patient with no complaints from previous session.  General  Reason for Referral: 65 yo male admitted 2' to perorated gastric ulcer, s/p laparoscopic sx, hyponatremia  Referred By: Dr. LANI Burton  Past Medical History Relevant to Rehab: COPD, ETOH use disorder, tobacco use dis order, HTN, spinal stenosis (cervical)  Family/Caregiver Present: No  Prior to Session Communication: Bedside nurse  Patient Position Received: Bed, 3 rail up, Alarm off, not on at start of session (Pt sitting up at EOB with bis tray table infront of him)  General Comment: Pt is letahrgic but agreeable to  work with PT. Pt is easily SOB and is exhibiting labored breathing. Pt is on 2L O2, 6L when ambulating    Subjective   Precautions:       Vital Signs (Past 2hrs)                 Objective   Pain:  Pain Assessment  Pain Assessment: 0-10  0-10 (Numeric) Pain Score: 0 - No pain  Cognition:  Cognition  Overall Cognitive Status: Within Functional Limits  Orientation Level: Oriented X4  Coordination:     Postural Control:  Static Sitting Balance  Static Sitting-Balance Support: Bilateral upper extremity supported, Feet supported  Static Sitting-Level of Assistance: Independent  Static Standing Balance  Static Standing-Balance Support: Bilateral upper extremity supported (wheeled walker)  Static Standing-Level of Assistance: Close supervision  Dynamic Standing Balance  Dynamic Standing-Balance Support: Bilateral upper extremity supported (wheeled walker)  Dynamic Standing-Level of Assistance: Close supervision  Extremity/Trunk Assessments:  RLE   RLE : Within Functional Limits  LLE   LLE : Within Functional Limits  Activity Tolerance:  Activity Tolerance  Endurance: Tolerates 10 - 20 min exercise with multiple rests  Treatments:  Therapeutic Exercise  Therapeutic Exercise Performed: Yes  Therapeutic Exercise Activity 1: Pt performed the following ex's; 10 sit<>stand functional strengthening trials for EOB to wheeled walker. stading ex's squats, heel raises, B hip extension and B hip abd each x 20 reps with good rest periods between sets         Ambulation/Gait Training  Ambulation/Gait Training Performed: Yes  Ambulation/Gait Training 1  Surface 1: Level tile  Device 1: Rolling walker  Assistance 1: Close supervision  Quality of Gait 1: Decreased step length  Comments/Distance (ft) 1: 150 feet  Transfers  Transfer: Yes  Transfer 1  Transfer From 1: Bed to  Transfer to 1: Stand  Technique 1: Sit to stand, Stand to sit  Transfer Device 1:  (wheeled walker)  Transfer Level of Assistance 1: Close supervision    Stairs  Stairs:  No    Outcome Measures:  [facility] Basic Mobility  Turning from your back to your side while in a flat bed without using bedrails: None  Moving from lying on your back to sitting on the side of a flat bed without using bedrails: None  Moving to and from bed to chair (including a wheelchair): A little  Standing up from a chair using your arms (e.g. wheelchair or bedside chair): A little  To walk in hospital room: A little  Climbing 3-5 steps with railing: A little  Basic Mobility - Total Score: 20    Education Documentation  No documentation found.  Education Comments  No comments found.        OP EDUCATION:       Encounter Problems       Encounter Problems (Active)       Mobility       STG - Patient will ambulate 150 feet MOD I with least restrictive device (Progressing)       Start:  11/05/24    Expected End:  11/19/24            STG - Patient will ascend and descend four to six stairs MOD I with B rails (Progressing)       Start:  11/05/24    Expected End:  11/19/24               PT Transfers       STG - Patient to transfer to and from sit to supine independently (Progressing)       Start:  11/05/24    Expected End:  11/19/24            STG - Patient will transfer sit to and from stand MOD I with least restrictive device (Progressing)       Start:  11/05/24    Expected End:  11/19/24               Pain - Adult

## 2024-11-07 NOTE — DISCHARGE SUMMARY
Discharge Diagnosis  Perforated gastric ulcer s/p anibal patch repair     Issues Requiring Follow-Up  #Perforated gastric ulcer s/p anibal patch repair   - Wound care instructions: Keep a dressing on the right abdomen drain site until it is closed up. Keep a dry, clean dressing on the wound if there is drainage. The initial bandage may be removed after 24 hours. Once the wound has quit draining, you may leave it open to air. If clothing rubs against the wound or causes irritation and the wound is not draining, you may cover it with a dry dressing during the daytime. Try to keep the wound dry and avoid ointments on the wound unless directed to do so.   - If the wound becomes bright red and painful or starts to drain infected material that is not clear, contact Dr. Burton's office immediately.   - If the wound is mildly pink and has a thick firm ridge underneath it, this is normal and is referred to as a healing ridge. This will resolve over the next 4-6 weeks.  - Topical hydrocortisone to the irritated skin on the abdomen. Apply this twice a day for up to 2 weeks. Can use moisturizer such as eucerin cream 3 times a day.   - Call Dr. Burton's office or come to ED with no delay if the bruise is expanding and painful.  - Take narcotic medications and anti-inflammatory medications  - Stop taking aspirin and mobic due to risk of GI upset/bleed  - Follow up with Dr. Burton in 2 weeks.     #Increased oxygen demand requiring home O2  #Hypoxemia  #Hx COPD  - Per inpatient pulmonology, suspect that patient had chronic hypoxemia/low normal O2 sat but now worse in settin gof abdominal surgery and possible atelectasis. - Encouraged to cough and take deep breaths, or if available incentive spirometer every 30 minutes when awake.   - Needs home O2: 2L O2 at rest and 6L O2 on exertion   - [facility]    *For all issues, follow up with PCP in 1 week of discharge    Discharge Meds     Medication List      START  taking these medications     benzocaine-menthol lozenge; Commonly known as: Cepastat Sore Throat;   Dissolve 1 lozenge in the mouth every 2 hours if needed for sore throat   for up to 2 days.   bismuth subsalicylate 262 mg chewable tablet; Commonly known as: Pepto   Bismol; Chew 1 tablet (262 mg) 4 times a day for 49 doses.   cetirizine 5 mg tablet; Commonly known as: ZyrTEC; Take 1 tablet (5 mg)   by mouth once daily for 7 days.   folic acid 1 mg tablet; Commonly known as: Folvite; Take 1 tablet (1 mg)   by mouth once daily.   hydrocortisone 1 % cream; Apply topically 2 times a day.   metroNIDAZOLE 500 mg tablet; Commonly known as: Flagyl; Take 1 tablet   (500 mg) by mouth 4 times a day for 7 days.   pantoprazole 40 mg EC tablet; Commonly known as: ProtoNix; Take 1 tablet   (40 mg) by mouth 2 times a day. Do not crush, chew, or split.   sodium chloride 0.65 % nasal spray; Commonly known as: Ocean; Administer   1 spray into each nostril 4 times a day as needed for congestion.   sucralfate 100 mg/mL suspension; Commonly known as: Carafate; Take 10 mL   (1 g) by mouth every 6 hours for 14 days.   tetracycline 500 mg capsule; Take 1 capsule (500 mg) by mouth 4 times a   day for 49 doses.   white petrolatum 41 % ointment ointment; Commonly known as: Aquaphor;   Apply 1 Application topically every 1 hour if needed (irritation).     CONTINUE taking these medications     albuterol 90 mcg/actuation inhaler   amLODIPine 10 mg tablet; Commonly known as: Norvasc   baclofen 10 mg tablet; Commonly known as: Lioresal   cholecalciferol 25 MCG (1000 UT) capsule; Commonly known as: Vitamin D-3   Combivent Respimat  mcg/actuation inhaler; Generic drug:   ipratropium-albuteroL   diclofenac sodium 1 % gel; Commonly known as: Voltaren   fluticasone 50 mcg/actuation nasal spray; Commonly known as: Flonase   gabapentin 300 mg capsule; Commonly known as: Neurontin   urea 40 % cream; Commonly known as: Carmol     STOP taking these  medications     aspirin 81 mg chewable tablet   meloxicam 15 mg tablet; Commonly known as: Mobic       Test Results Pending At Discharge  Pending Labs       No current pending labs.            Hospital Course  Taqueria Montano is a 64 y.o. male with a PMH of COPD, alcohol use disorder, tobacco use disorder, HTN, spinal stenosis of cervical region, neuropathy, who was transferred from [facility] on [facility]24 for management of perforated gastric ulcer. Per pt, he initially started having some abdominal pain about a week ago. He thought it was due to constipation as he was unable to have a BM. His pain kept getting worse.      He did  also report feeling more fatigued, short of breath. He says he has COPD and has been using his inhalers at home. Reports some increased coughing from baseline, nonproductive and that he is having a harder time catching his breath. Inhalers have not been controlling it well. CXR in the ER showed possible consolidation, which could represent pneumonia. Patient was placed on Zosyn for abdominal infection which would also cover possible pneumonia.     Pt reports he generally drinks between 4-6 beers, sometimes a little more. Denies drinking any liquor or other alcohol in addition.  He is a smoker and smokes 2ppd.     Patient was admitted, started on Zosyn for antibiotic coverage of intra-abdominal infection and taken to the OR by surgery overnight 11/1 for anibal patch repair of perforated gastric ulcer. DANIELLE drain was placed. Patient with NG tube and DANIELLE tube in place post-operatively. Patient's breathing improved significantly after surgery, SOB likely partly 2/2 abdominal pain and distension.       Per surgery recommendation, NG tube out on 11/4 overnight. FL upper GI w KUB no leakage or obstruction. Advanced to full liquid diet per surgery recs tolerated diet well, so  diet advanced to regular diet.  Surgery also recommended H. pylori treatment which was initially started with  clarithromycin, changed to tetracycline, metronidazole, bismuth, PPI protocol.  As patient was already on Zosyn until 11/7/2024, started metronidazole on 11/7/2024 and will continue for 7 more days with the other medication in the regimen.   Surgery recommended to follow-up with Dr. Burton on 11/20/2024 at 2:30 PM at the [place] location, they already made the appointment.      Attempting to wean off O2, but home O2 evaluation showed he needs 2 L of oxygen at rest and 6 L on exertion.  We consulted pulmonology, recommended the above oxygen requirement suspecting patient had chronic hypoxemia or low normal oxygenation which now worse in the setting of abdominal surgery with possible elements of atelectasis.  Also recommended continue incentive spirometry and DuoNeb.  He also needs further evaluation as outpatient so need to [facility] .  Physical therapy recommended low intensity therapy.  We are discharging the patient to home with PT OT referral.      Pertinent Physical Exam At Time of Discharge  Physical Exam  Constitutional:       General: He is not in acute distress.  HENT:      Head: Normocephalic and atraumatic.      Mouth/Throat:      Pharynx: Oropharynx is clear.   Cardiovascular:      Rate and Rhythm: Normal rate and regular rhythm.      Heart sounds: Murmur heard.   Pulmonary:      Effort: Pulmonary effort is normal. No respiratory distress.      Breath sounds: Normal breath sounds. No stridor. No wheezing or rales.      Comments: On 2L of O2 this morning  Abdominal:      General: There is no distension.      Palpations: Abdomen is soft.      Tenderness: There is mild abdominal tenderness. There is no guarding.      Comments: Patient with closed laparoscopic incisions are clean and dry  Musculoskeletal:      Right lower leg: No edema.      Left lower leg: No edema.   Skin:     General: Skin is warm and dry.   Neurological:      General: No focal deficit present.      Mental  Status: He is alert.   Psychiatric:         Mood and Affect: Mood normal.         Behavior: Behavior normal.     Outpatient Follow-Up  Future Appointments   Date Time Provider Department Center   11/20/2024  2:30 PM Beverly Burton MD [id number] None         Neva To DO

## 2024-11-07 NOTE — CARE PLAN
Pt is discharged today and needs new O2 set up.  He is a  and O2 order and required packet sent to [facility] Home O2 Department.  Rec'd a call back from Jeaneth at [phone] who tells me, due to pt not smoking for just 1 week (while he was in hospital), the [facility] will not be able to set O2 with pt until pt: 1) has his [facility] PCP put in a request for pt to see a [facility]monologist at the [facility]  2) the pulmonologist will do a risk assessment, and 3) pending risk assessment, pt will need to attend a smoking cessation program. After that VA will determine if they will cover the O2.  Met with pt who is agreeable to plan, but has no other insurance to cover O2.  Consulted with RT who made referral to [facility] who will set up a payment plan with pt.  Pt also called his [facility]  Aleshia Gomez  - [phone], email: [email or web address] who will follow up with pt to make sure pt gets the follow-up needed.  Aleshia Gomez to call pt at home matt.

## 2024-11-09 ENCOUNTER — TELEPHONE (OUTPATIENT)
Dept: HOME HEALTH SERVICES | Facility: HOME HEALTH | Age: 64
End: 2024-11-09
Payer: OTHER GOVERNMENT

## 2024-11-09 NOTE — TELEPHONE ENCOUNTER
Milan Escobar     [facility]  received a fax 11/8/2024 for Taqueria . Per SW/ CT  JOSE ANTONIO Koch  Patient declined hhc with [facility]  and is an active patient with [facility]  and has a active [facility] PCP .A request for additional assessments were sent to the PCP and to [facility] . Home care orders /referral were cancelled .     Providers, please reach out to  Home Care with any questions regarding the declined referral.      Thank you     RUFUS GARRIDO LPN

## 2024-11-13 LAB
ATRIAL RATE: 80 BPM
P AXIS: 78 DEGREES
P OFFSET: 192 MS
P ONSET: 138 MS
PR INTERVAL: 160 MS
Q ONSET: 218 MS
QRS COUNT: 13 BEATS
QRS DURATION: 82 MS
QT INTERVAL: 384 MS
QTC CALCULATION(BAZETT): 442 MS
QTC FREDERICIA: 423 MS
R AXIS: 64 DEGREES
T AXIS: 71 DEGREES
T OFFSET: 410 MS
VENTRICULAR RATE: 80 BPM

## 2024-11-20 ENCOUNTER — APPOINTMENT (OUTPATIENT)
Facility: CLINIC | Age: 64
End: 2024-11-20
Payer: OTHER GOVERNMENT

## 2024-11-20 VITALS
HEIGHT: 69 IN | DIASTOLIC BLOOD PRESSURE: 74 MMHG | OXYGEN SATURATION: 99 % | SYSTOLIC BLOOD PRESSURE: 116 MMHG | BODY MASS INDEX: 22.51 KG/M2 | HEART RATE: 93 BPM | WEIGHT: 152 LBS

## 2024-11-20 DIAGNOSIS — K25.1 ACUTE GASTRIC ULCER WITH PERFORATION (MULTI): Primary | ICD-10-CM

## 2024-11-20 PROCEDURE — 99024 POSTOP FOLLOW-UP VISIT: CPT | Performed by: SURGERY

## 2024-11-20 NOTE — PROGRESS NOTES
Subjective   Patient ID: Taqueria Montano is a 64 y.o. male who presents for Follow-up (Patient here for follow up after surgery.).  HPI this is a pleasant gentleman here today for follow-up after having a emergency surgery for a perforated gastric ulcer.  The patient was discharged from the hospital on November 7 his surgery was on November 1.  The patient has chronic pulmonary disease to begin with and is home now on oxygen and recovering he is able to eat he is moving his bowels his weight is stable his abdomen is okay he is taking the Protonix on a daily basis and he has stopped the meloxicam that he was on.    Review of Systems  10 point review is otherwise negative  Objective   Physical Exam head is normocephalic atraumatic eyes extraocular movements are intact pupils are equal and round the patient is on nasal oxygen abdomen is soft and nontender incisions are clean there is some erythema and probably a seroma to the umbilical incision and the left upper quadrant incision but the skin is intact I would simply like him to follow-up in 2 weeks if there is any drainage I instructed his wife to cover the incisions.    Assessment/Plan perforated gastric ulcer likely secondary to meloxicam and baby aspirin.  The patient is now stopped the meloxicam he is on Protonix and I am also ordering a Helicobacter pylori breath test.  Follow-up in 2 weeks           Beverly Burton MD 11/20/24 3:46 PM

## 2024-11-21 ENCOUNTER — LAB (OUTPATIENT)
Dept: LAB | Facility: LAB | Age: 64
End: 2024-11-21
Payer: OTHER GOVERNMENT

## 2024-11-21 DIAGNOSIS — K25.1 ACUTE GASTRIC ULCER WITH PERFORATION (MULTI): ICD-10-CM

## 2024-11-21 PROCEDURE — 83013 H PYLORI (C-13) BREATH: CPT

## 2024-11-22 LAB — UREA BREATH TEST QL: NEGATIVE

## 2024-11-27 NOTE — PROGRESS NOTES
"Delayed entry November 27, 2024  0144    Taqueria Montano is a 64 y.o. male on day 0 of admission presenting with 1 week of progressively worsening upper abdominal pain.  Patient was initially evaluated by Dr. Santos.  Please see his note for full details of the H&P.  Patient was signed out to me at change of shift pending CT scan results.      Subjective   Patient states that the pain medicine does help his pain somewhat.  Breathing is better since the breathing treatment.       Objective     Physical Exam  Vitals reviewed.   HENT:      Head: Normocephalic.      Mouth/Throat:      Mouth: Mucous membranes are moist.   Eyes:      Extraocular Movements: Extraocular movements intact.      Pupils: Pupils are equal, round, and reactive to light.   Abdominal:      General: Abdomen is flat. Bowel sounds are normal.      Palpations: Abdomen is rigid.      Tenderness: There is abdominal tenderness in the epigastric area.   Skin:     General: Skin is warm and dry.      Coloration: Skin is not jaundiced.   Neurological:      General: No focal deficit present.      Mental Status: He is alert.         Last Recorded Vitals  Blood pressure (!) 109/94, pulse 90, temperature (!) 38 °C (100.4 °F), resp. rate (!) 24, height 1.753 m (5' 9\"), weight 73.6 kg (162 lb 4.1 oz), SpO2 97%.  Intake/Output last 3 Shifts:  No intake/output data recorded.    Relevant Results               Labs Reviewed   CBC WITH AUTO DIFFERENTIAL - Abnormal       Result Value    WBC 9.9      nRBC 0.0      RBC 4.90      Hemoglobin 16.6      Hematocrit 46.0      MCV 94      MCH 33.9      MCHC 36.1 (*)     RDW 12.4      Platelets 479 (*)     Neutrophils % 76.1      Immature Granulocytes %, Automated 0.2      Lymphocytes % 16.3      Monocytes % 5.2      Eosinophils % 1.4      Basophils % 0.8      Neutrophils Absolute 7.50      Immature Granulocytes Absolute, Automated 0.02      Lymphocytes Absolute 1.61      Monocytes Absolute 0.51      Eosinophils Absolute 0.14      " Basophils Absolute 0.08     COMPREHENSIVE METABOLIC PANEL - Abnormal    Glucose 79      Sodium 123 (*)     Potassium 3.9      Chloride 90 (*)     Bicarbonate 24      Anion Gap 13      Urea Nitrogen 9      Creatinine 0.87      eGFR >90      Calcium 9.3      Albumin 4.5      Alkaline Phosphatase 69      Total Protein 8.1      AST 26      Bilirubin, Total 0.4      ALT 15     LACTATE - Abnormal    Lactate 2.6 (*)     Narrative:     Venipuncture immediately after or during the administration of Metamizole may lead to falsely low results. Testing should be performed immediately prior to Metamizole dosing.   ALCOHOL - Abnormal    Alcohol 108 (*)    LACTATE - Abnormal    Lactate 2.3 (*)     Narrative:     Venipuncture immediately after or during the administration of Metamizole may lead to falsely low results. Testing should be performed immediately prior to Metamizole dosing.   BLOOD CULTURE - Normal    Blood Culture No growth at 4 days -  FINAL REPORT     BLOOD CULTURE - Normal    Blood Culture No growth at 4 days -  FINAL REPORT     LIPASE - Normal    Lipase 62      Narrative:     Venipuncture immediately after or during the administration of Metamizole may lead to falsely low results. Testing should be performed immediately prior to Metamizole dosing.   PROTIME-INR - Normal    Protime 12.5      INR 1.1     TROPONIN I, HIGH SENSITIVITY - Normal    Troponin I, High Sensitivity 7      Narrative:     Less than 99th percentile of normal range cutoff-  Female and children under 18 years old <14 ng/L; Male <21 ng/L: Negative  Repeat testing should be performed if clinically indicated.     Female and children under 18 years old 14-50 ng/L; Male 21-50 ng/L:  Consistent with possible cardiac damage and possible increased clinical   risk. Serial measurements may help to assess extent of myocardial damage.     >50 ng/L: Consistent with cardiac damage, increased clinical risk and  myocardial infarction. Serial measurements may help  assess extent of   myocardial damage.      NOTE: Children less than 1 year old may have higher baseline troponin   levels and results should be interpreted in conjunction with the overall   clinical context.     NOTE: Troponin I testing is performed using a different   testing methodology at [facility] than at other   [facility] hospitals. Direct result comparisons should only   be made within the same method.   GRAY TOP    Extra Tube Hold for add-ons.       XR chest 1 view   Final Result   1. Mild left basilar atelectasis/scarring or infiltrate. Recommend   clinical correlation and follow-up with PA and lateral chest x-rays   to document resolution.   2. Tortuous a possibly ectatic aorta with wall calcification but not   significantly changed since previous exam accounting for difference   in technique of imaging. If there is clinical concern for acute   aortic pathology or pulmonary embolic disease, further evaluation   with chest CT is recommended for better assessment.   3. Lucency within the right subdiaphragmatic space is abnormal and   may reflect increased in size of intraperitoneal air such as related   to previous surgery or bowel perforation. Recommend clinical   correlation and follow-up.             SUPPLEMENTAL INFORMATION:   Notifi message was left for MATTHIAS EARL regarding this exam by Dr. Guillory on 10/31/2024 at approximately 20:29 hours.             Critical Finding:  See findings. Notification was initiated on   10/31/2024 at 8:28 pm by  Nuno Guillory.  (**-OCF-**) Instructions:        Signed by: Nuno Guillory 10/31/2024 8:29 PM   Dictation workstation:   [id number]      CT abdomen pelvis w IV contrast   Final Result   Wall thickening and irregularity of the stomach. There is 4 cm x 1.4   cm irregular heterogeneous area of hypoattenuation in the wall of the   greater curvature of the stomach on coronal image 35 of 119   concerning for underlying gastric ulcer. There is evidence of  free   abdominal air compatible with viscus perforation and the findings are   concerning for perforated gastric ulcer. Other source of perforation   of the bowel is also not excluded. Heterogeneous hyperdensity in the   stomach lumen may correspond to hemorrhage. Small amount of free   fluid in the abdomen and pelvis likely relating to the perforation.        Foci of peripheral air in segment of the proximal transverse colon   the right abdomen and pneumatosis ischemia is not excluded.        Underdistention versus urinary bladder wall thickening; please   correlate urinalysis to exclude cystitis.        MACRO:   Alec Llamas discussed the significance and urgency of this critical   finding by telephone with the emergency room physician on 10/31/2024   at 7:55 pm.  (**-RCF-**) Findings:  See findings.        Signed by: Alec Llamas 10/31/2024 7:55 PM   Dictation workstation:   [id number]                       Assessment/Plan   Assessment & Plan  Acute gastric ulcer with perforation (Multi)    Chronic obstructive pulmonary disease, unspecified COPD type (Multi)    Lactate blood increase    Patient presented to the emergency department with the above history and physical.  At time of initial assessment phone call received from radiology regarding critical findings on abdominal CT consistent with acute perforated gastric ulcer per his report.    Urgent consultation was obtained with Dr. Burton general surgery on-call at [facility] including patient's past medical history, presenting signs and symptoms, current clinical condition and test results.  She has graciously accepted patient for transfer.  She has requested nasogastric tube placement.    Despite multiple attempts to place nasogastric tube by nursing staff was unsuccessful as the tube kept coiling up in the back of the throat; therefore, further attempts discontinued.  Critical care transport is also now present and ready to take patient to [facility].        I spent 17 minutes in the critical care of this patient.      Eun Wilkins MD

## 2024-12-04 ENCOUNTER — LAB (OUTPATIENT)
Dept: LAB | Facility: LAB | Age: 64
End: 2024-12-04
Payer: OTHER GOVERNMENT

## 2024-12-04 ENCOUNTER — APPOINTMENT (OUTPATIENT)
Facility: CLINIC | Age: 64
End: 2024-12-04
Payer: OTHER GOVERNMENT

## 2024-12-04 VITALS
SYSTOLIC BLOOD PRESSURE: 160 MMHG | HEART RATE: 168 BPM | DIASTOLIC BLOOD PRESSURE: 77 MMHG | RESPIRATION RATE: 18 BRPM | HEIGHT: 69 IN | OXYGEN SATURATION: 78 % | WEIGHT: 155.8 LBS | BODY MASS INDEX: 23.08 KG/M2

## 2024-12-04 DIAGNOSIS — K92.1 BLACK STOOL: ICD-10-CM

## 2024-12-04 DIAGNOSIS — K92.1 BLACK STOOL: Primary | ICD-10-CM

## 2024-12-04 LAB
ERYTHROCYTE [DISTWIDTH] IN BLOOD BY AUTOMATED COUNT: 14 % (ref 11.5–14.5)
HCT VFR BLD AUTO: 35.7 % (ref 41–52)
HGB BLD-MCNC: 12.1 G/DL (ref 13.5–17.5)
MCH RBC QN AUTO: 32.7 PG (ref 26–34)
MCHC RBC AUTO-ENTMCNC: 33.9 G/DL (ref 32–36)
MCV RBC AUTO: 97 FL (ref 80–100)
NRBC BLD-RTO: 0 /100 WBCS (ref 0–0)
PLATELET # BLD AUTO: 480 X10*3/UL (ref 150–450)
RBC # BLD AUTO: 3.7 X10*6/UL (ref 4.5–5.9)
WBC # BLD AUTO: 8.1 X10*3/UL (ref 4.4–11.3)

## 2024-12-04 PROCEDURE — 36415 COLL VENOUS BLD VENIPUNCTURE: CPT

## 2024-12-04 PROCEDURE — 99024 POSTOP FOLLOW-UP VISIT: CPT | Performed by: SURGERY

## 2024-12-04 ASSESSMENT — PAIN SCALES - GENERAL: PAINLEVEL_OUTOF10: 0-NO PAIN

## 2024-12-04 NOTE — PROGRESS NOTES
Subjective   Patient ID: Taqueria Montano is a 64 y.o. male who presents for Follow-up.  HPI  This is a pleasant patient who had a perforated gastric ulcer several weeks ago he is doing well at home eating his appetite is improving he is back to his baseline in terms of his respiratory status.  Initially complained of some back black stools however turns out he has been taking Pepto-Bismol.  We will check a CBC just to be sure.  Review of Systems 10 point review is negative    Objective   Physical Exam abdomen is flat soft and nontender incisions are almost completely healed.  They are better than they were last visit.    Assessment/Plan can follow-up as needed           Beverly Burton MD 12/04/24 3:54 PM

## 2025-02-21 ENCOUNTER — APPOINTMENT (OUTPATIENT)
Dept: RADIOLOGY | Facility: HOSPITAL | Age: 65
End: 2025-02-21
Payer: OTHER GOVERNMENT

## 2025-03-13 ENCOUNTER — HOSPITAL ENCOUNTER (OUTPATIENT)
Dept: RADIOLOGY | Facility: HOSPITAL | Age: 65
Discharge: HOME | End: 2025-03-13
Payer: COMMERCIAL

## 2025-03-13 DIAGNOSIS — R09.89 OTHER SPECIFIED SYMPTOMS AND SIGNS INVOLVING THE CIRCULATORY AND RESPIRATORY SYSTEMS: ICD-10-CM

## 2025-03-13 PROCEDURE — 93880 EXTRACRANIAL BILAT STUDY: CPT | Performed by: SURGERY

## 2025-03-13 PROCEDURE — 93880 EXTRACRANIAL BILAT STUDY: CPT

## 2025-07-07 ENCOUNTER — HOSPITAL ENCOUNTER (OUTPATIENT)
Dept: RADIOLOGY | Facility: HOSPITAL | Age: 65
Discharge: HOME | End: 2025-07-07
Payer: OTHER GOVERNMENT

## 2025-07-07 DIAGNOSIS — M25.512 LEFT SHOULDER PAIN: ICD-10-CM

## 2025-07-07 PROCEDURE — 73030 X-RAY EXAM OF SHOULDER: CPT | Mod: LT

## (undated) DEVICE — SOLUTION, IRRIGATION, SODIUM CHLORIDE 0.9%, 1000 ML, POUR BOTTLE

## (undated) DEVICE — NEEDLE, INSUFFLATION, 13GAX120MM, DISP

## (undated) DEVICE — SUTURE, VICRYL, 0, 27 IN, UR-6, VIOLET

## (undated) DEVICE — SCISSOR, MINI ENDO CUT, TIPS, DISP

## (undated) DEVICE — SPONGE, LAP, XRAY DECT, SC+RFID, 4X18, STERILE

## (undated) DEVICE — TRAY, FOLEY, ADVANCE, 16FR, SILICONE, W/STATLOCK

## (undated) DEVICE — DRAIN, CHANNEL, HUBLESS, 1/4 ROUND FULL FLUTE, 19 FR/W 1/4" TROCAR"

## (undated) DEVICE — RESERVOIR, DRAINAGE, WOUND, JACKSON-PRATT, 100 CC, SILICONE

## (undated) DEVICE — COVER, TABLE, 44X90

## (undated) DEVICE — CARE KIT, LAPAROSCOPIC, ADVANCED

## (undated) DEVICE — ADHESIVE, SKIN, DERMABOND ADVANCED, 15CM, PEN-STYLE

## (undated) DEVICE — SUTURE, PROLENE, 2-0, 30 IN, CT2, BLUE

## (undated) DEVICE — ELECTRODE, OPTI2 LAPAROSCOPIC SPATULA, CURVED

## (undated) DEVICE — SUTURE, CTD, VICRYL 0, CT-2, 27 IN, VIOLET BRAIDED

## (undated) DEVICE — TROCAR, OPTICAL BLADELESS 5MM X 100 W/ADVANCED FIXATION

## (undated) DEVICE — SUTURE, VICRYL, 3-0, 27 IN, CT-2, UNDYED

## (undated) DEVICE — SUTURE, CTD, VICRYL, 2-0, UND, BR, CT-2

## (undated) DEVICE — ACCESS SYS, KII SHIELDED BLADED, Z-THREAD, 5X100CM

## (undated) DEVICE — DRAPE PACK, MAJOR, CUSTOM, GEAUGA

## (undated) DEVICE — ASSEMBLY, STRYKER FLOW 2, SUCTION IRRIGATOR, WITH TIP

## (undated) DEVICE — DRESSING, GAUZE, DRAIN SPONGE, 6 PLY, EXCILON, 4 X 4 IN, STERILE

## (undated) DEVICE — ELECTRODE, ELECTROSURGICAL, BLADE, INSULATED, ENT/IMA, STERILE

## (undated) DEVICE — DRAPE PACK, LAPAROSCOPIC CHOLECYSTECTOMY, CUSTOM, GEAUGA

## (undated) DEVICE — SUTURE, PDS II, 0, 27 IN, CT-2, VIOLET

## (undated) DEVICE — CONTAINER, SPECIMEN, 120ML

## (undated) DEVICE — GOWN, ASTOUND, L

## (undated) DEVICE — HANDPIECE, POOLE SUCTION, W/O TUBING

## (undated) DEVICE — PREP TRAY, SKIN, DRY, W/GLOVES

## (undated) DEVICE — TIP, SUCTION, YANKAUER, BULB, ADULT